# Patient Record
Sex: FEMALE | Race: WHITE | NOT HISPANIC OR LATINO | Employment: OTHER | URBAN - METROPOLITAN AREA
[De-identification: names, ages, dates, MRNs, and addresses within clinical notes are randomized per-mention and may not be internally consistent; named-entity substitution may affect disease eponyms.]

---

## 2017-10-24 ENCOUNTER — ALLSCRIPTS OFFICE VISIT (OUTPATIENT)
Dept: OTHER | Facility: OTHER | Age: 42
End: 2017-10-24

## 2017-10-24 ENCOUNTER — LAB REQUISITION (OUTPATIENT)
Dept: LAB | Facility: HOSPITAL | Age: 42
End: 2017-10-24
Payer: MEDICARE

## 2017-10-24 DIAGNOSIS — Z11.3 ENCOUNTER FOR SCREENING FOR INFECTIONS WITH PREDOMINANTLY SEXUAL MODE OF TRANSMISSION: ICD-10-CM

## 2017-10-24 PROCEDURE — 87591 N.GONORRHOEAE DNA AMP PROB: CPT | Performed by: NURSE PRACTITIONER

## 2017-10-24 PROCEDURE — 87491 CHLMYD TRACH DNA AMP PROBE: CPT | Performed by: NURSE PRACTITIONER

## 2017-10-25 LAB
CHLAMYDIA DNA CVX QL NAA+PROBE: NORMAL
N GONORRHOEA DNA GENITAL QL NAA+PROBE: NORMAL

## 2017-10-27 NOTE — PROGRESS NOTES
Assessment  1  Contraception management (V25 9) (Z30 9)    Plan  VD (venereal disease) screening    · (1) CHLAMYDIA/GC AMPLIFIED DNA, PCR; Source:Urine, Unspecified Source; Status: In  Progress - Specimen/Data Collected;   Done: [id number]   Perform:[facility] In Office Collection; IVV:[id number]; Ordered; For:VD (venereal disease) screening; Ordered By:Shivam Warren;    Discussion/Summary  Discussion Summary:   Patient referred for LN-IUD by her PCP  risks/benefits of IUD with understanding  states that her PCP also reviewed risks with her and gave her the IUD pamphlet gyn exam is up to date, done by pcp in 2017need for GC/chlamydia screening prior to IUD insertion and this was obtained today  preauthorize coverage of IUD with her insurance  this is done, will schedule insertion of IUD  Counseling Documentation With Imm: The patient was counseled regarding  Goals and Barriers: The patient has the current Goals: Contraceptive management with LN-IUD  The patent has the current Barriers: None  Patient's Capacity to Self-Care: Patient is able to Self-Care  Medication SE Review and Pt Understands Tx: Possible side effects of new medications were reviewed with the patient/guardian today  The treatment plan was reviewed with the patient/guardian  The patient/guardian understands and agrees with the treatment plan   Self Referrals:   Self Referrals: No      Chief Complaint  Chief Complaint Free Text Note Form: ACUTE EXAMwants to discuss birth control      History of Present Illness  HPI: 36 yo NEW PATIENT presents for GYN visit  alternative contraception  She was referred here by her PCP for LN-IUD  got depo provera injection last Friday, 4 days ago  that her PCP wants her to stop taking Depo Provera-- due to risk of being on it too long  hx: 2017 negativehx: menarche 16; , 2nd trim termination d/t trisomy 18hx: GC in past, treatedamenorrhea on depo  history: Cognitive delay (on disability); hypothyroidism; GERD; obesity        Past Medical History  1  History of Cognitive developmental delay (315 9) (F81 9)  Active Problems And Past Medical History Reviewed: The active problems and past medical history were reviewed and updated today  Surgical History  Surgical History Reviewed: The surgical history was reviewed and updated today  Family History  Father    1  Family history of diabetes mellitus (V18 0) (Z83 3)   2  Family history of hypertension (V17 49) (Z82 49)   3  Family history of lung cancer (V16 1) (Z80 1)  Family History Reviewed: The family history was reviewed and updated today  Social History   · Denied: History of Alcohol use   · Feels safe at home   · Never a smoker  Social History Reviewed: The social history was reviewed and updated today  Current Meds   1  Amoxicillin 500 MG Oral Capsule; Therapy: (Recorded:24Oct2017) to Recorded   2  Depo-Provera 150 MG/ML Intramuscular Suspension; Therapy: (Recorded:24Oct2017) to Recorded   3  Flonase 50 MCG/ACT SUSP; Therapy: (ZLQKYLZX:[id number]) to Recorded   4  Levothyroxine Sodium 50 MCG Oral Tablet; Therapy: (FSPUZLAU:[id number]) to Recorded   5  Neomycin-Polymyxin-HC SUSP; Therapy: (TFUBOQFU:[id number]) to Recorded   6  Neurontin 100 MG Oral Capsule; Therapy: (WHMEHANH:[id number]) to Recorded   7  Omeprazole 40 MG Oral Capsule Delayed Release; Therapy: (QADRGZRA:[id number]) to Recorded   8  Zyrtec 10 MG TABS; Therapy: (TVKGAVDW:60OMM7842) to Recorded  Medication List Reviewed: The medication list was reviewed and updated today  Allergies  1  No Known Drug Allergies  2  No Known Environmental Allergies   3   No Known Food Allergies    Vitals  Vital Signs    Recorded: 88WVA6455 41:08EN   Systolic 688   Diastolic 76   Height 5 ft 5 in   Weight 249 lb    BMI Calculated 41 44   BSA Calculated 2 17     Physical Exam    Constitutional   General appearance: No acute distress, well appearing and well nourished         Signatures   Electronically signed by : Elba Ramirez NP; Oct 24 2017 11:37AM EST                       (Author)    Electronically signed by : LESTER Valles ; Oct 26 2017  7:53AM EST                       (Author)

## 2017-11-07 ENCOUNTER — GENERIC CONVERSION - ENCOUNTER (OUTPATIENT)
Dept: OTHER | Facility: OTHER | Age: 42
End: 2017-11-07

## 2018-01-12 NOTE — MISCELLANEOUS
Provider Comments  Provider Comments:   Patient had an appointment this morning at 11am and did not show  Patient was called and spoke to patient about her missed appointment  Patient stated she is not going to reschedule her appointment with us due to a friend of hers being on disability and has the same birth control that she wants and on disability as well and [facility]        Signatures   Electronically signed by : Mark James, ; Nov 7 2017 11:36AM EST                       (Author)

## 2018-01-13 VITALS
BODY MASS INDEX: 41.48 KG/M2 | WEIGHT: 249 LBS | HEIGHT: 65 IN | SYSTOLIC BLOOD PRESSURE: 122 MMHG | DIASTOLIC BLOOD PRESSURE: 76 MMHG

## 2018-03-07 NOTE — PROGRESS NOTES
Discussion/Summary    38 yo patient on disability with Medicare-- LN-IUD not covered and patient needs alternative to Depo Provera, per her PCP  Call made to Jagjit Barlow, rep for [facility], to see if there is an assistance program   He will research option and advise us further        Signatures   Electronically signed by : Aleida Castelan NP; Nov 7 2017  9:07AM EST                       (Author)

## 2018-07-10 ENCOUNTER — TELEPHONE (OUTPATIENT)
Dept: FAMILY MEDICINE CLINIC | Facility: CLINIC | Age: 43
End: 2018-07-10

## 2018-07-10 DIAGNOSIS — L85.3 DRY SKIN: ICD-10-CM

## 2018-07-10 DIAGNOSIS — K21.9 GASTROESOPHAGEAL REFLUX DISEASE WITHOUT ESOPHAGITIS: Primary | ICD-10-CM

## 2018-07-10 RX ORDER — OMEPRAZOLE 40 MG/1
40 CAPSULE, DELAYED RELEASE ORAL DAILY
Qty: 30 CAPSULE | Refills: 5 | Status: SHIPPED | OUTPATIENT
Start: 2018-07-10 | End: 2018-07-13 | Stop reason: SDUPTHER

## 2018-07-10 RX ORDER — OMEPRAZOLE 40 MG/1
40 CAPSULE, DELAYED RELEASE ORAL DAILY
COMMUNITY
End: 2018-07-10 | Stop reason: SDUPTHER

## 2018-07-10 NOTE — TELEPHONE ENCOUNTER
Pt called stating she needs a refill on her omeprazole 40 mg  She takes it once a day  She is also asking if she can get the Triamcinolone Cream 0 1% refilled  She states she is having a breakout again on her right arm like she did last time  Pt [facility] pharmacy on file

## 2018-07-13 DIAGNOSIS — K21.9 GASTROESOPHAGEAL REFLUX DISEASE WITHOUT ESOPHAGITIS: ICD-10-CM

## 2018-07-13 RX ORDER — OMEPRAZOLE 40 MG/1
40 CAPSULE, DELAYED RELEASE ORAL DAILY
Qty: 90 CAPSULE | Refills: 4 | Status: SHIPPED | OUTPATIENT
Start: 2018-07-13 | End: 2019-09-20 | Stop reason: SDUPTHER

## 2018-08-07 ENCOUNTER — OFFICE VISIT (OUTPATIENT)
Dept: FAMILY MEDICINE CLINIC | Facility: CLINIC | Age: 43
End: 2018-08-07
Payer: MEDICARE

## 2018-08-07 VITALS
HEIGHT: 65 IN | SYSTOLIC BLOOD PRESSURE: 110 MMHG | TEMPERATURE: 98 F | DIASTOLIC BLOOD PRESSURE: 70 MMHG | HEART RATE: 60 BPM | RESPIRATION RATE: 18 BRPM | BODY MASS INDEX: 40.47 KG/M2 | WEIGHT: 242.9 LBS

## 2018-08-07 DIAGNOSIS — B37.9 CANDIDA INFECTION: Primary | ICD-10-CM

## 2018-08-07 PROCEDURE — 99213 OFFICE O/P EST LOW 20 MIN: CPT | Performed by: NURSE PRACTITIONER

## 2018-08-07 RX ORDER — LEVONORGESTREL AND ETHINYL ESTRADIOL 0.15-0.03
KIT ORAL
Refills: 3 | COMMUNITY
Start: 2018-06-18 | End: 2018-11-06 | Stop reason: SDUPTHER

## 2018-08-07 RX ORDER — ERGOCALCIFEROL (VITAMIN D2) 1250 MCG
CAPSULE ORAL
COMMUNITY
Start: 2017-11-13 | End: 2021-06-01 | Stop reason: ALTCHOICE

## 2018-08-07 RX ORDER — PHENOL 1.4 %
AEROSOL, SPRAY (ML) MUCOUS MEMBRANE EVERY 24 HOURS
COMMUNITY
Start: 2018-01-10

## 2018-08-07 RX ORDER — NYSTATIN 100000 [USP'U]/G
POWDER TOPICAL 3 TIMES DAILY
Qty: 15 G | Refills: 0 | Status: SHIPPED | OUTPATIENT
Start: 2018-08-07 | End: 2018-08-14 | Stop reason: SDUPTHER

## 2018-08-07 RX ORDER — LEVOTHYROXINE SODIUM 0.05 MG/1
TABLET ORAL
COMMUNITY
End: 2018-11-27 | Stop reason: SDUPTHER

## 2018-08-07 RX ORDER — GABAPENTIN 100 MG/1
CAPSULE ORAL
COMMUNITY
End: 2020-12-22

## 2018-08-07 NOTE — PROGRESS NOTES
Assessment/Plan:    No problem-specific Assessment & Plan notes found for this encounter  Diagnoses and all orders for this visit:    Candida infection  Comments:  Do not wear swim suit all day at [facility] under breasts for moisture absorbsion      Other orders  -     levothyroxine 50 mcg tablet; Take by mouth  -     ALTAVERA 0 15-30 MG-MCG per tablet; TAKE 1 TABLET BY ORAL ROUTE EVERY DAY CONTINUOUS ACTIVE MEDICATION FOR 84 DAYS THEN PLACEBO FOR 7  -     ergocalciferol (ERGOCALCIFEROL) 98456 units capsule; take 1 capsule by oral route  every week  -     calcium carbonate (OS-AUGIE) 600 MG tablet; every 24 hours  -     gabapentin (NEURONTIN) 100 mg capsule; Take by mouth          Subjective:   Chief Complaint   Patient presents with    Rash     Bilateral        Patient ID: Pili Simms is a 40 y o  female  Presents today with rash under bilateral breast for over a week  Rash is described as itchy and uncomfortable  The following portions of the patient's history were reviewed and updated as appropriate: allergies, current medications, past family history, past medical history, past social history, past surgical history and problem list     Review of Systems   Constitutional: Negative for chills and fever  Respiratory: Negative for cough  Cardiovascular: Negative for chest pain  Skin: Positive for rash (under bilateral breasts)  Psychiatric/Behavioral: Negative for dysphoric mood  The patient is not nervous/anxious  Objective:  Vitals:    08/07/18 0821   BP: 110/70   BP Location: Left arm   Patient Position: Sitting   Cuff Size: Large   Pulse: 60   Resp: 18   Temp: 98 °F (36 7 °C)   TempSrc: Temporal   Weight: 110 kg (242 lb 14 4 oz)   Height: 5' 5" (1 651 m)      Physical Exam   Constitutional: She is oriented to person, place, and time  She appears well-developed and well-nourished     Cardiovascular: Normal rate, regular rhythm and normal heart sounds  Pulmonary/Chest: Effort normal and breath sounds normal    Neurological: She is alert and oriented to person, place, and time  Skin: Skin is warm and dry  Rash noted  Rash is maculopapular  There is erythema  Bilateral yeast rash   Psychiatric: She has a normal mood and affect   Her behavior is normal

## 2018-08-07 NOTE — PATIENT INSTRUCTIONS
Skin Yeast Infection   WHAT YOU NEED TO KNOW:   Yeast is normally present on the skin  Infection happens when you have too much yeast, or when it gets into a cut on your skin  Certain types of mold and fungus can cause a yeast infection  A skin yeast infection can appear anywhere on your skin or nail beds  Skin yeast infections are usually found on warm, moist parts of the body  Examples include between skin folds or under the breasts  DISCHARGE INSTRUCTIONS:   Return to the emergency department if:   · You have signs of infection, such as pus, warmth or red streaks coming from the wound, or a fever  Contact your healthcare provider if:   · Your symptoms worsen or do not get better within 7 to 10 days  · You have new or returning signs of a skin yeast infection after treatment  · You have questions or concerns about your condition or care  Medicines:   · Antifungal medicine  may be given as a cream, ointment, or pill  · Take your medicine as directed  Contact your healthcare provider if you think your medicine is not helping or if you have side effects  Tell him or her if you are allergic to any medicine  Keep a list of the medicines, vitamins, and herbs you take  Include the amounts, and when and why you take them  Bring the list or the pill bottles to follow-up visits  Carry your medicine list with you in case of an emergency  Care for the skin near the infection:  You may only have discolored patches of skin, or areas that are dry and flaking  Care for these skin problems as directed by your healthcare provider  If you have painful skin or an open sore, you will need to protect the skin and prevent damage  You will also need to keep the skin dry as much as possible  Ask your healthcare provider how to care for your skin while the infection clears  The following are general guidelines for caring for painful or open skin:  · Keep the skin clean    Ask your healthcare provider if you should wash with mild soap and water  Do not use soap that contains alcohol  Alcohol can dry and irritate the skin and make symptoms worse  Your baby's healthcare provider may tell you to use diaper cream or ointment when you change his diaper  This will protect the skin and prevent moisture from collecting  · Keep the skin dry  Pat the area dry with a towel  Do not rub, because this may irritate the skin  If you have a skin yeast infection between skin folds, lift the top part gently and hold it while you dry between your skin folds  Always dry your feet completely after you swim or bathe, including between your toes  Dry your skin if you are sweating from exercise or exposure to heat  Use a clean towel each time to prevent spreading or continuing the infection  · Keep the skin protected  Ask your healthcare provider if you should cover the area with a bandage or leave it open  Check your skin each day to make sure you do not have new or worsening problems  You may need to have someone check the skin if you cannot see the area easily  Prevent another skin yeast infection:   · Do not share clothing or towels    · Wear shower shoes if you need to use a public shower    · Dry your feet completely after you bathe, and apply antifungal powder or cream as directed    · Put on socks before you get dressed so you do not spread fungus from your feet    · Wear light clothing that allows air to get to your skin    · Manage your weight to prevent skin folds where yeast can collect    · Manage diabetes    · Change your baby's diaper often, and keep the area clean and dry as much as possible    · Use a diaper cream or ointment that contains zinc oxide or dimethicone on your baby's diaper area as directed  Follow up with your healthcare provider as directed:  Write down your questions so you remember to ask them during your visits     © 2017 [employer] Information is for End User's use only and may not be sold, redistributed or otherwise used for commercial purposes  All illustrations and images included in CareNotes® are the copyrighted property of A D A M , Inc  or Eyad Brewster  The above information is an  only  It is not intended as medical advice for individual conditions or treatments  Talk to your doctor, nurse or pharmacist before following any medical regimen to see if it is safe and effective for you

## 2018-08-14 DIAGNOSIS — L85.3 DRY SKIN: ICD-10-CM

## 2018-08-14 DIAGNOSIS — B37.9 CANDIDA INFECTION: ICD-10-CM

## 2018-08-14 RX ORDER — NYSTATIN 100000 [USP'U]/G
POWDER TOPICAL 3 TIMES DAILY
Qty: 15 G | Refills: 0 | Status: SHIPPED | OUTPATIENT
Start: 2018-08-14 | End: 2019-05-09 | Stop reason: SDUPTHER

## 2018-08-14 NOTE — TELEPHONE ENCOUNTER
Pt seen rodney last Tuesday for her yeast under her breast the medication that was given is working but its not all the way done and she only has two day left can you please refill for at least one more tube nystatin powder   Pollo Whelan is out of the office today thank you   Please call her at [phone]  [facility]

## 2018-09-21 ENCOUNTER — CLINICAL SUPPORT (OUTPATIENT)
Dept: FAMILY MEDICINE CLINIC | Facility: CLINIC | Age: 43
End: 2018-09-21
Payer: MEDICARE

## 2018-09-21 DIAGNOSIS — Z23 NEED FOR IMMUNIZATION AGAINST INFLUENZA: Primary | ICD-10-CM

## 2018-09-21 PROCEDURE — 90686 IIV4 VACC NO PRSV 0.5 ML IM: CPT

## 2018-09-21 PROCEDURE — G0008 ADMIN INFLUENZA VIRUS VAC: HCPCS

## 2018-09-21 NOTE — PATIENT INSTRUCTIONS
Vacuna contra la gripe, cuidados ambulatorios   INFORMACIÓN GENERAL:   La vacuna contra la gripe  es leigh ann inyección o aerosol nasal que se aplica para ayudar en la prevención de la influenza (gripe)  La influenza es causada por un virus  El virus se propaga de persona a persona por medio de la tos y los estornudos  Varios tipos de virus causan la influenza  [place] virus [place] con el [place], la producción de nuevas vacunas cada año es necesaria  La vacuna comienza la protección contra la influenza aproximadamente 2 semanas después de recibirla  Tipos de vacuna contra la gripe:   · Vacuna contra la gripe:  La vacuna contra la gripe generalmente se aplica en la parte superior del brazo  Podría aplicarse en barr muslo  Usted no puede contraer la gripe de la vacuna porque los tipos de virus usados para hacer la vacuna no están vivos  · Aerosol nasal:  Esta vacuna contra la gripe se administra en forma de aerosol en la nariz  Esta vacuna está hecha de virus débiles de la influenza, que aún están vivos y puede causar enfermedad leve, timothy no causa gripe  Cuándo ponerse la vacuna contra la gripe:  La vacuna contra la gripe se ofrece cada año empezando en octubre o noviembre  Se recomienda ser vacunado contra la gripe tan pronto esté disponible  Los [address]  [employer] 6 meses a 8 años de edad necesitan 2 vacunas ani el primer año de recibirla  Las 2 vacunas deben aplicarse con un intervalo entre 4 o [address]  Es mejor aún si se aplica el mismo tipo de vacuna ambas veces     Quiénes deben recibir la vacuna contra la gripe:   · Bebés mayores de 6 meses de doni     · Cualquier adulto saludable a quien le gustaría reducir el riesgo de contraer la gripe     · Cualquier persona que vive con, o provee cuidado a niños menores de 5 años de edad     · Trabajadores de julieta     · Cualquier persona que viva en centros de convalecencia a milind plazo    · Cualquier persona que sufra de problemas de julieta crónicos, alexandre asma, diabetes, o trastornos en la rosalva     · Cualquier persona cuyo sistema inmunológico está débil     · Mujeres embarazadas o que están planificando quedar en embarazo ani la temporada de la gripe  Quiénes no deben recibir la vacuna contra la gripe:   · Bebés menores de 6 meses     · Cualquier persona que haya sufrido leigh ann reacción alérgica a la vacuna contra la gripe    · Cualquier persona que esté enferma o tenga fiebre     · Cualquier persona que haya recibido un diagnostico del síndrome de [place]illain[place] dentro de las primeras 6 semanas después de luz recibido la inyección contra la gripe     · Cualquier persona alérgica al timerosal (nickolas)  Quiénes deben recibir el aerosol nasal:  La mayoría de las personas saludables entre 2 a 52 años de edad pueden ponerse el aerosol nasal en vez de la inyección  Fabienne Kevin no deben recibir el aerosol nasal:   · Cualquier james lori de 2 años de edad o cualquier persona mayor de 48 años de edad     · [employer] edades de 2 a 4 años que padecen de asma, o jett tenido sibilancias ani los últimos 12 meses     · Cualquier persona que haya sufrido leigh ann reacción alérgica al aerosol nasal     · Cualquier persona que tenga leigh ann alergia a los huevos     · Cualquier persona con problemas de julieta crónicos, alexandre asma, diabetes o trastornos de la rosalva     · Cualquier persona con un sistema inmunológico débil     · Cualquier trabajador de julieta que cuide a leigh ann persona con un sistema inmunológico débil     · Mujeres embarazadas     · Cualquier persona que esté enferma o tenga fiebre     · Cualquier persona que haya recibido un diagnostico del síndrome de Guillain[place] dentro de las primeras 6 semanas de luz recibido leigh ann vacuna contra la gripe  Riesgos de la vacuna contra la gripe:  El área donde le aplicaron la vacuna puede estar enrojecida, adolorida o inflamada  La vacuna contra la influenza podría causar síntomas leves, alexandre fiebre, dolor de [place] y [employer]  El aerosol nasal podría causar fiebre, congestión o flujo nasal, dolor de christie, casper musculares o vómito  Usted aún podría contraer la gripe después de recibir la vacuna  Si usted es alérgico a los [place], pregunte acerca de leigh ann vacuna sin huevo  Usted puede tener leigh ann reacción alérgica a la vacuna  [place] puede ser potencialmente mortal   Busque cuidados inmediatos para los siguientes síntomas:   · Kemi delmi o inflamada    · Urticaria que se propaga por todo barr cuerpo    · Debilidad o mareos    · [place] y gargantas inflamadas    · Sibilancias o dificultad para respirar    · Dolor de pecho o latidos cardíacos rápidos  Programe leigh ann brody con barr proveedor de julieta alexandre se le haya indicado: Anote kami preguntas para que se acuerde de hacerlas ani kami visitas  ACUERDOS SOBRE BARR CUIDADO:   Usted tiene el derecho de participar en la planificación de barr cuidado  Aprenda todo lo que pueda sobre barr condición y alexandre darle tratamiento  Discuta con kami médicos kami opciones de tratamiento para juntos decidir el cuidado que usted quiere recibir  Usted siempre tiene el derecho a rechazar barr tratamiento  Esta información es sólo para uso en educación  Barr intención no es darle un consejo médico sobre enfermedades o tratamientos  Colsulte con barr [place] farmacéutico antes de seguir cualquier régimen médico para saber si es seguro y efectivo para usted  © 2014 [address] is for End User's use only and may not be sold, redistributed or otherwise used for commercial purposes  All illustrations and images included in CareNotes® are the copyrighted property of A D A M , Inc  or Eyad Brewster

## 2018-11-06 ENCOUNTER — ANNUAL EXAM (OUTPATIENT)
Dept: FAMILY MEDICINE CLINIC | Facility: CLINIC | Age: 43
End: 2018-11-06
Payer: MEDICARE

## 2018-11-06 VITALS
SYSTOLIC BLOOD PRESSURE: 120 MMHG | RESPIRATION RATE: 18 BRPM | WEIGHT: 246.7 LBS | BODY MASS INDEX: 41.1 KG/M2 | HEART RATE: 64 BPM | DIASTOLIC BLOOD PRESSURE: 90 MMHG | HEIGHT: 65 IN

## 2018-11-06 DIAGNOSIS — Z30.41 ENCOUNTER FOR BIRTH CONTROL PILLS MAINTENANCE: ICD-10-CM

## 2018-11-06 DIAGNOSIS — Z01.419 WELL FEMALE EXAM WITH ROUTINE GYNECOLOGICAL EXAM: Primary | ICD-10-CM

## 2018-11-06 DIAGNOSIS — Z12.39 SCREENING FOR MALIGNANT NEOPLASM OF BREAST: ICD-10-CM

## 2018-11-06 PROCEDURE — 99214 OFFICE O/P EST MOD 30 MIN: CPT | Performed by: NURSE PRACTITIONER

## 2018-11-06 PROCEDURE — 87624 HPV HI-RISK TYP POOLED RSLT: CPT | Performed by: NURSE PRACTITIONER

## 2018-11-06 PROCEDURE — G0145 SCR C/V CYTO,THINLAYER,RESCR: HCPCS | Performed by: NURSE PRACTITIONER

## 2018-11-06 RX ORDER — LEVONORGESTREL AND ETHINYL ESTRADIOL 0.15-0.03
1 KIT ORAL DAILY
Qty: 90 TABLET | Refills: 0 | Status: SHIPPED | OUTPATIENT
Start: 2018-11-06 | End: 2018-12-10 | Stop reason: SDUPTHER

## 2018-11-06 NOTE — PROGRESS NOTES
Subjective     Alo Padilla is a 40 y o   female and is here for routine health maintenance  The patient reports no problems  History of Present Illness     HPI    Well Adult Physical   Patient here for a Gynecological exam       Diet and Physical Activity  Diet: poor diet  Weight concerns: Patient has class 3 obesity (BMI >40)  Exercise: rarely      Depression Screen  PHQ-9 Depression Screening    PHQ-9:    Frequency of the following problems over the past two weeks:                History:  LMP: 10/20/2018  Menses regular  yes  Cycle Length On OCP's consistantly for 3 months then menses  Flow light  Menorrhagia  no  Dysmenorrhea   no  : 3  Para: 2  Breast Fed no  Bottle Fed yes  Both no   Screening  Pap 2017  Abnormal pap? no  STI no  [employer] >5    The following portions of the patient's history were reviewed and updated as appropriate: allergies, current medications, past family history, past medical history, past social history, past surgical history and problem list     Review of Systems     Review of Systems   Constitutional: Negative for chills, fatigue and fever  Cardiovascular: Negative for leg swelling  Gastrointestinal: Negative for abdominal pain  Genitourinary: Negative for dyspareunia, dysuria, frequency, genital sores, menstrual problem, pelvic pain, urgency, vaginal bleeding, vaginal discharge and vaginal pain  Skin: Negative for rash  Hematological: Negative for adenopathy  Psychiatric/Behavioral: Negative for dysphoric mood  The patient is not nervous/anxious          Breast ROS: No self breast exam  Self Breast Exam No  Genito-Urinary ROS: no dysuria, trouble voiding, or hematuria  Sexually Active not currently  Birth Control Method OCP's  Sexual Dysfunction  no  Vitamin D no    Past Medical History     Past Medical History:   Diagnosis Date    Disease of thyroid gland     GERD (gastroesophageal reflux disease)        Past Surgical History     Past Surgical History:   Procedure Laterality Date     SECTION  2004    PREGNANCY AT TERM       Social History     Social History     Social History    Marital status: Single     Spouse name: N/A    Number of children: N/A    Years of education: N/A     Social History Main Topics    Smoking status: Never Smoker    Smokeless tobacco: Never Used      Comment: NO TOBACCO USE    Alcohol use No    Drug use: No    Sexual activity: No      Comment: Amneal     Other Topics Concern    None     Social History Narrative    None       Family History     Family History   Problem Relation Age of Onset    Thyroid disease Mother         DISORDER    Lung cancer Father     Diabetes Father         MELLITUS    Hypertension Father        Current Medications       Current Outpatient Prescriptions:     ALTAVERA 0 15-30 MG-MCG per tablet, TAKE 1 TABLET BY ORAL ROUTE EVERY DAY CONTINUOUS ACTIVE MEDICATION FOR 84 DAYS THEN PLACEBO FOR 7, Disp: , Rfl: 3    calcium carbonate (OS-AUGIE) 600 MG tablet, every 24 hours, Disp: , Rfl:     ergocalciferol (ERGOCALCIFEROL) 97739 units capsule, take 1 capsule by oral route  every week, Disp: , Rfl:     gabapentin (NEURONTIN) 100 mg capsule, Take by mouth, Disp: , Rfl:     levothyroxine 50 mcg tablet, Take by mouth, Disp: , Rfl:     omeprazole (PriLOSEC) 40 MG capsule, Take 1 capsule (40 mg total) by mouth daily, Disp: 90 capsule, Rfl: 4    nystatin (MYCOSTATIN) powder, Apply topically 3 (three) times a day (Patient not taking: Reported on 2018 ), Disp: 15 g, Rfl: 0    triamcinolone (KENALOG) 0 1 % ointment, Apply topically 2 (two) times a day (Patient not taking: Reported on 2018 ), Disp: 30 g, Rfl: 0     Allergies     Allergies   Allergen Reactions    No Active Allergies        Objective     /90 (BP Location: Left arm, Patient Position: Sitting, Cuff Size: Large)   Pulse 64   Resp 18   Ht 5' 5" (1 651 m)   Wt 112 kg (246 lb 11 2 oz)   LMP 2018 (Approximate)   BMI 41 05 kg/m²      Physical Exam   Constitutional: She is oriented to person, place, and time  She appears well-developed and well-nourished  Neck: No thyromegaly present  Cardiovascular: Normal rate, regular rhythm and normal heart sounds  Pulmonary/Chest: Effort normal and breath sounds normal  Right breast exhibits no inverted nipple, no mass, no nipple discharge, no skin change and no tenderness  Left breast exhibits no inverted nipple, no mass, no nipple discharge, no skin change and no tenderness  Breasts are symmetrical    Abdominal: There is no tenderness  Genitourinary: Uterus normal  No breast swelling, tenderness, discharge or bleeding  No labial fusion  There is no rash, tenderness, lesion or injury on the right labia  There is no rash, tenderness, lesion or injury on the left labia  Cervix exhibits no motion tenderness, no discharge and no friability  Right adnexum displays no mass, no tenderness and no fullness  Left adnexum displays no mass, no tenderness and no fullness  No vaginal discharge found  Genitourinary Comments: Obesity limits exam accuracy     Lymphadenopathy:     She has no cervical adenopathy  Neurological: She is alert and oriented to person, place, and time  Skin: Skin is warm and dry  Psychiatric: She has a normal mood and affect   Her behavior is normal          No exam data present    Health Maintenance     Health Maintenance   Topic Date Due    MAMMOGRAM  1976    DTaP,Tdap,and Td Vaccines (2 - Td) 09/22/2019    INFLUENZA VACCINE  Completed     Immunization History   Administered Date(s) Administered    Fluzone Split Quad 0 25 mL 10/05/2016    Influenza 10/05/2016, 09/27/2017, 09/27/2017    Influenza, injectable, quadrivalent, preservative free 0 5 mL 09/21/2018    Tdap 09/22/2009       Assessment/Plan     Healthy well female  Waiting on PAP/HPV, Mammo results  Diet and exercise discussed    Mindy Claude, CRNP

## 2018-11-09 LAB
HPV HR 12 DNA CVX QL NAA+PROBE: NEGATIVE
HPV16 DNA CVX QL NAA+PROBE: NEGATIVE
HPV18 DNA CVX QL NAA+PROBE: NEGATIVE

## 2018-11-12 LAB
LAB AP GYN PRIMARY INTERPRETATION: NORMAL
Lab: NORMAL

## 2018-11-13 ENCOUNTER — TELEPHONE (OUTPATIENT)
Dept: FAMILY MEDICINE CLINIC | Facility: CLINIC | Age: 43
End: 2018-11-13

## 2018-11-27 DIAGNOSIS — E03.9 HYPOTHYROIDISM, UNSPECIFIED TYPE: Primary | ICD-10-CM

## 2018-11-28 RX ORDER — LEVOTHYROXINE SODIUM 0.05 MG/1
50 TABLET ORAL DAILY
Qty: 30 TABLET | Refills: 12 | Status: SHIPPED | OUTPATIENT
Start: 2018-11-28 | End: 2019-09-17 | Stop reason: SDUPTHER

## 2018-12-10 ENCOUNTER — TELEPHONE (OUTPATIENT)
Dept: FAMILY MEDICINE CLINIC | Facility: CLINIC | Age: 43
End: 2018-12-10

## 2018-12-10 DIAGNOSIS — Z30.41 ENCOUNTER FOR BIRTH CONTROL PILLS MAINTENANCE: ICD-10-CM

## 2018-12-10 RX ORDER — LEVONORGESTREL AND ETHINYL ESTRADIOL 0.15-0.03
1 KIT ORAL DAILY
Qty: 90 TABLET | Refills: 0 | Status: SHIPPED | OUTPATIENT
Start: 2018-12-10 | End: 2018-12-11 | Stop reason: SDUPTHER

## 2018-12-10 NOTE — TELEPHONE ENCOUNTER
Please call Simón Brian and see what the issue is  The directions are for her to take 84 days of active medication and 7 of inactive

## 2018-12-10 NOTE — TELEPHONE ENCOUNTER
Can you please refill patient medication [facility] she normally gets 4 packs 90 days supply

## 2018-12-10 NOTE — TELEPHONE ENCOUNTER
Patient called she said the Rx was called in wrong for the Altavera 0 15-30 mg she wants a four pack of her birth control pills called into [facility] at [phone] she said that she is on her last row of her birth control pill she would like it taken care of   TY

## 2018-12-11 ENCOUNTER — TELEPHONE (OUTPATIENT)
Dept: FAMILY MEDICINE CLINIC | Facility: CLINIC | Age: 43
End: 2018-12-11

## 2018-12-11 DIAGNOSIS — Z30.41 ENCOUNTER FOR BIRTH CONTROL PILLS MAINTENANCE: ICD-10-CM

## 2018-12-11 RX ORDER — LEVONORGESTREL AND ETHINYL ESTRADIOL 0.15-0.03
1 KIT ORAL DAILY
Qty: 120 TABLET | Refills: 3 | Status: SHIPPED | OUTPATIENT
Start: 2018-12-11 | End: 2019-06-25

## 2019-01-24 ENCOUNTER — OFFICE VISIT (OUTPATIENT)
Dept: FAMILY MEDICINE CLINIC | Facility: CLINIC | Age: 44
End: 2019-01-24
Payer: MEDICARE

## 2019-01-24 VITALS
BODY MASS INDEX: 41.82 KG/M2 | TEMPERATURE: 96.1 F | HEART RATE: 76 BPM | WEIGHT: 251 LBS | DIASTOLIC BLOOD PRESSURE: 70 MMHG | HEIGHT: 65 IN | SYSTOLIC BLOOD PRESSURE: 92 MMHG

## 2019-01-24 DIAGNOSIS — B37.9 CANDIDIASIS: Primary | ICD-10-CM

## 2019-01-24 DIAGNOSIS — R73.09 ABNORMAL GLUCOSE: ICD-10-CM

## 2019-01-24 DIAGNOSIS — E66.01 CLASS 3 SEVERE OBESITY DUE TO EXCESS CALORIES WITHOUT SERIOUS COMORBIDITY WITH BODY MASS INDEX (BMI) OF 40.0 TO 44.9 IN ADULT (HCC): ICD-10-CM

## 2019-01-24 DIAGNOSIS — E03.9 HYPOTHYROIDISM, UNSPECIFIED TYPE: ICD-10-CM

## 2019-01-24 PROCEDURE — 99213 OFFICE O/P EST LOW 20 MIN: CPT | Performed by: NURSE PRACTITIONER

## 2019-01-24 RX ORDER — NYSTATIN 100000 U/G
CREAM TOPICAL 2 TIMES DAILY
Qty: 30 G | Refills: 0 | Status: SHIPPED | OUTPATIENT
Start: 2019-01-24 | End: 2020-08-14 | Stop reason: ALTCHOICE

## 2019-01-24 RX ORDER — MONTELUKAST SODIUM 10 MG/1
TABLET ORAL EVERY 24 HOURS
COMMUNITY
Start: 2018-05-15 | End: 2020-12-22

## 2019-01-24 RX ORDER — NYSTATIN 100000 U/G
CREAM TOPICAL 2 TIMES DAILY
Status: DISCONTINUED | OUTPATIENT
Start: 2019-01-24 | End: 2019-01-24

## 2019-01-24 NOTE — PATIENT INSTRUCTIONS
Skin Yeast Infection   WHAT YOU NEED TO KNOW:   What do I need to know about a skin yeast infection? Yeast is normally present on the skin  Infection happens when you have too much yeast, or when it gets into a cut on your skin  Certain types of mold and fungus can cause a yeast infection  A skin yeast infection can appear anywhere on your skin or nail beds  Skin yeast infections are usually found on warm, moist parts of the body  Examples include between skin folds or under the breasts  What increases my risk for a skin yeast infection? · Elderly age, especially as skin gets thinner and tears more easily    · Obesity that causes skin folds where moisture can collect    · Diapers that are not changed regularly and allow moisture to sit on your baby's skin    · Diabetes, especially if it is not controlled    · Bedrest that allows moisture to collect on your skin    · Immune system problems    · Certain medicines, including antibiotics or medicines that weaken your immune system    · Pregnancy or hormone changes    · Moisture left on your feet or between your toes after you bathe, or that builds up under a ring you wear  What are the signs and symptoms of a skin yeast infection? Signs and symptoms will depend on the type of yeast causing the infection, and where the infection is located  · Red, scaly skin    · Changes in skin color, especially a beefy red color    · Itching, dry skin    · Painful, cracking skin at the corners of your mouth    · Thick, discolored, chipping nails    · Skin lesions that may be red or purple and round    · Pus bumps  How is a skin yeast infection diagnosed and treated? Your healthcare provider may know you have a skin yeast infection from your signs and symptoms  He may take a sample of your skin to check for fungus  He may also look at areas of your skin under ultraviolet light to show which type of yeast infection you have   You may be given an antifungal cream or ointment to treat the infection  You may be given antifungal medicine as a pill if your infection is severe  How do I care for the skin near the infection? You may only have discolored patches of skin, or areas that are dry and flaking  Care for these skin problems as directed by your healthcare provider  If you have painful skin or an open sore, you will need to protect the skin and prevent damage  You will also need to keep the skin dry as much as possible  Ask your healthcare provider how to care for your skin while the infection clears  The following are general guidelines for caring for painful or open skin:  · Keep the skin clean  Ask your healthcare provider if you should wash with mild soap and water  Do not use soap that contains alcohol  Alcohol can dry and irritate the skin and make symptoms worse  Your baby's healthcare provider may tell you to use diaper cream or ointment when you change his diaper  This will protect the skin and prevent moisture from collecting  · Keep the skin dry  Pat the area dry with a towel  Do not rub, because this may irritate the skin  If you have a skin yeast infection between skin folds, lift the top part gently and hold it while you dry between your skin folds  Always dry your feet completely after you swim or bathe, including between your toes  Dry your skin if you are sweating from exercise or exposure to heat  Use a clean towel each time to prevent spreading or continuing the infection  · Keep the skin protected  Ask your healthcare provider if you should cover the area with a bandage or leave it open  Check your skin each day to make sure you do not have new or worsening problems  You may need to have someone check the skin if you cannot see the area easily  What can I do to prevent a skin yeast infection?    · Do not share clothing or towels    · Wear shower shoes if you need to use a public shower    · Dry your feet completely after you bathe, and apply antifungal powder or cream as directed    · Put on socks before you get dressed so you do not spread fungus from your feet    · Wear light clothing that allows air to get to your skin    · Manage your weight to prevent skin folds where yeast can collect    · Manage diabetes    · Change your baby's diaper often, and keep the area clean and dry as much as possible    · Use a diaper cream or ointment that contains zinc oxide or dimethicone on your baby's diaper area as directed  When should I seek immediate care? · You have signs of infection, such as pus, warmth or red streaks coming from the wound, or a fever  When should I contact my healthcare provider? · Your symptoms worsen or do not get better within 7 to 10 days  · You have new or returning signs of a skin yeast infection after treatment  · You have questions or concerns about your condition or care  CARE AGREEMENT:   You have the right to help plan your care  Learn about your health condition and how it may be treated  Discuss treatment options with your caregivers to decide what care you want to receive  You always have the right to refuse treatment  The above information is an  only  It is not intended as medical advice for individual conditions or treatments  Talk to your doctor, nurse or pharmacist before following any medical regimen to see if it is safe and effective for you  © 2017 [employer]  Information is for End User's use only and may not be sold, redistributed or otherwise used for commercial purposes  All illustrations and images included in CareNotes® are the copyrighted property of [employer]  or [employer]

## 2019-01-24 NOTE — PROGRESS NOTES
Assessment/Plan:    No problem-specific Assessment & Plan notes found for this encounter  Diagnoses and all orders for this visit:    Candidiasis  -     Discontinue: nystatin (MYCOSTATIN) cream; Apply topically 2 (two) times a day   -     Comprehensive metabolic panel; Future  -     Lipid panel; Future  -     CBC and differential; Future  -     HEMOGLOBIN A1C W/ EAG ESTIMATION; Future  -     Vitamin D 25 hydroxy; Future  -     nystatin (MYCOSTATIN) cream; Apply topically 2 (two) times a day    Hypothyroidism, unspecified type  -     Comprehensive metabolic panel; Future  -     TSH, 3rd generation with Free T4 reflex; Future    Class 3 severe obesity due to excess calories without serious comorbidity with body mass index (BMI) of 40 0 to 44 9 in [facility])  -     Comprehensive metabolic panel; Future  -     Lipid panel; Future  -     TSH, 3rd generation with Free T4 reflex; Future  -     CBC and differential; Future  -     HEMOGLOBIN A1C W/ EAG ESTIMATION; Future  -     Vitamin D 25 hydroxy; Future    Abnormal glucose  -     HEMOGLOBIN A1C W/ EAG ESTIMATION; Future          Subjective:      Patient ID: Rodrigo Acharya is a 44 y o  female  HPI      Viki Ricks presents today for rash on her left armpit area  Started 2 days ago  She has discomfort more of a burning painful when she lifts and move that left arm  She has nothing in the right armpit at this time  She states when she puts on deodorant it really burns  She has subsequently stopped using it in both  The following portions of the patient's history were reviewed and updated as appropriate: allergies, current medications, past family history, past medical history, past social history, past surgical history and problem list     Review of Systems   Constitutional: Negative for activity change, appetite change, fatigue, fever and unexpected weight change  HENT: Negative for congestion, dental problem and sneezing      Eyes: Negative for discharge and visual disturbance  Respiratory: Negative for cough and wheezing  Gastrointestinal: Negative for abdominal pain, constipation, diarrhea, nausea and vomiting  Endocrine: Negative for polydipsia and polyuria  Genitourinary: Negative for dysuria and frequency  Musculoskeletal: Negative for arthralgias  Skin: Positive for rash  Allergic/Immunologic: Negative for environmental allergies and food allergies  Neurological: Negative for headaches  Hematological: Negative for adenopathy  Psychiatric/Behavioral: Negative for behavioral problems and sleep disturbance  Objective:  Vitals:    01/24/19 1056   BP: 92/70   BP Location: Right arm   Patient Position: Sitting   Cuff Size: Large   Pulse: 76   Temp: (!) 96 1 °F (35 6 °C)   TempSrc: Temporal   Weight: 114 kg (251 lb)   Height: 5' 5" (1 651 m)      Physical Exam   Constitutional: She appears well-developed and well-nourished  Skin:        Vitals reviewed

## 2019-01-25 ENCOUNTER — CLINICAL SUPPORT (OUTPATIENT)
Dept: FAMILY MEDICINE CLINIC | Facility: CLINIC | Age: 44
End: 2019-01-25
Payer: MEDICARE

## 2019-01-25 DIAGNOSIS — E03.9 HYPOTHYROIDISM, UNSPECIFIED TYPE: ICD-10-CM

## 2019-01-25 DIAGNOSIS — R73.09 ABNORMAL GLUCOSE: ICD-10-CM

## 2019-01-25 DIAGNOSIS — E66.01 CLASS 3 SEVERE OBESITY DUE TO EXCESS CALORIES WITHOUT SERIOUS COMORBIDITY WITH BODY MASS INDEX (BMI) OF 40.0 TO 44.9 IN ADULT (HCC): ICD-10-CM

## 2019-01-25 DIAGNOSIS — B37.9 CANDIDIASIS: ICD-10-CM

## 2019-01-25 LAB
25(OH)D3 SERPL-MCNC: 22.1 NG/ML (ref 30–100)
ALBUMIN SERPL BCP-MCNC: 3.2 G/DL (ref 3.5–5)
ALP SERPL-CCNC: 67 U/L (ref 46–116)
ALT SERPL W P-5'-P-CCNC: 22 U/L (ref 12–78)
ANION GAP SERPL CALCULATED.3IONS-SCNC: 9 MMOL/L (ref 4–13)
AST SERPL W P-5'-P-CCNC: 18 U/L (ref 5–45)
BASOPHILS # BLD AUTO: 0.05 THOUSANDS/ΜL (ref 0–0.1)
BASOPHILS NFR BLD AUTO: 1 % (ref 0–1)
BILIRUB SERPL-MCNC: 0.55 MG/DL (ref 0.2–1)
BUN SERPL-MCNC: 18 MG/DL (ref 5–25)
CALCIUM SERPL-MCNC: 8.8 MG/DL (ref 8.3–10.1)
CHLORIDE SERPL-SCNC: 104 MMOL/L (ref 100–108)
CHOLEST SERPL-MCNC: 177 MG/DL (ref 50–200)
CO2 SERPL-SCNC: 22 MMOL/L (ref 21–32)
CREAT SERPL-MCNC: 0.99 MG/DL (ref 0.6–1.3)
EOSINOPHIL # BLD AUTO: 0.18 THOUSAND/ΜL (ref 0–0.61)
EOSINOPHIL NFR BLD AUTO: 3 % (ref 0–6)
ERYTHROCYTE [DISTWIDTH] IN BLOOD BY AUTOMATED COUNT: 13.3 % (ref 11.6–15.1)
EST. AVERAGE GLUCOSE BLD GHB EST-MCNC: 117 MG/DL
GFR SERPL CREATININE-BSD FRML MDRD: 71 ML/MIN/1.73SQ M
GLUCOSE P FAST SERPL-MCNC: 77 MG/DL (ref 65–99)
HBA1C MFR BLD: 5.7 % (ref 4.2–6.3)
HCT VFR BLD AUTO: 42.7 % (ref 34.8–46.1)
HDLC SERPL-MCNC: 36 MG/DL (ref 40–60)
HGB BLD-MCNC: 13.7 G/DL (ref 11.5–15.4)
IMM GRANULOCYTES # BLD AUTO: 0.02 THOUSAND/UL (ref 0–0.2)
IMM GRANULOCYTES NFR BLD AUTO: 0 % (ref 0–2)
LDLC SERPL CALC-MCNC: 120 MG/DL (ref 0–100)
LYMPHOCYTES # BLD AUTO: 2.14 THOUSANDS/ΜL (ref 0.6–4.47)
LYMPHOCYTES NFR BLD AUTO: 30 % (ref 14–44)
MCH RBC QN AUTO: 27.2 PG (ref 26.8–34.3)
MCHC RBC AUTO-ENTMCNC: 32.1 G/DL (ref 31.4–37.4)
MCV RBC AUTO: 85 FL (ref 82–98)
MONOCYTES # BLD AUTO: 0.53 THOUSAND/ΜL (ref 0.17–1.22)
MONOCYTES NFR BLD AUTO: 8 % (ref 4–12)
NEUTROPHILS # BLD AUTO: 4.12 THOUSANDS/ΜL (ref 1.85–7.62)
NEUTS SEG NFR BLD AUTO: 58 % (ref 43–75)
NONHDLC SERPL-MCNC: 141 MG/DL
NRBC BLD AUTO-RTO: 0 /100 WBCS
PLATELET # BLD AUTO: 315 THOUSANDS/UL (ref 149–390)
PMV BLD AUTO: 11.6 FL (ref 8.9–12.7)
POTASSIUM SERPL-SCNC: 4.6 MMOL/L (ref 3.5–5.3)
PROT SERPL-MCNC: 7.5 G/DL (ref 6.4–8.2)
RBC # BLD AUTO: 5.04 MILLION/UL (ref 3.81–5.12)
SODIUM SERPL-SCNC: 135 MMOL/L (ref 136–145)
TRIGL SERPL-MCNC: 105 MG/DL
TSH SERPL DL<=0.05 MIU/L-ACNC: 2.93 UIU/ML (ref 0.36–3.74)
WBC # BLD AUTO: 7.04 THOUSAND/UL (ref 4.31–10.16)

## 2019-01-25 PROCEDURE — 82306 VITAMIN D 25 HYDROXY: CPT | Performed by: NURSE PRACTITIONER

## 2019-01-25 PROCEDURE — 36415 COLL VENOUS BLD VENIPUNCTURE: CPT | Performed by: NURSE PRACTITIONER

## 2019-01-25 PROCEDURE — 85025 COMPLETE CBC W/AUTO DIFF WBC: CPT | Performed by: NURSE PRACTITIONER

## 2019-01-25 PROCEDURE — 80053 COMPREHEN METABOLIC PANEL: CPT | Performed by: NURSE PRACTITIONER

## 2019-01-25 PROCEDURE — 83036 HEMOGLOBIN GLYCOSYLATED A1C: CPT | Performed by: NURSE PRACTITIONER

## 2019-01-25 PROCEDURE — 80061 LIPID PANEL: CPT | Performed by: NURSE PRACTITIONER

## 2019-01-25 PROCEDURE — 84443 ASSAY THYROID STIM HORMONE: CPT | Performed by: NURSE PRACTITIONER

## 2019-04-30 ENCOUNTER — TELEPHONE (OUTPATIENT)
Dept: FAMILY MEDICINE CLINIC | Facility: CLINIC | Age: 44
End: 2019-04-30

## 2019-05-09 ENCOUNTER — OFFICE VISIT (OUTPATIENT)
Dept: FAMILY MEDICINE CLINIC | Facility: CLINIC | Age: 44
End: 2019-05-09
Payer: MEDICARE

## 2019-05-09 VITALS
DIASTOLIC BLOOD PRESSURE: 70 MMHG | RESPIRATION RATE: 18 BRPM | SYSTOLIC BLOOD PRESSURE: 118 MMHG | HEART RATE: 88 BPM | OXYGEN SATURATION: 97 % | TEMPERATURE: 97.6 F | BODY MASS INDEX: 41.1 KG/M2 | HEIGHT: 65 IN | WEIGHT: 246.7 LBS

## 2019-05-09 DIAGNOSIS — B37.9 CANDIDA INFECTION: Primary | ICD-10-CM

## 2019-05-09 PROCEDURE — 99213 OFFICE O/P EST LOW 20 MIN: CPT | Performed by: NURSE PRACTITIONER

## 2019-05-09 RX ORDER — NYSTATIN 100000 [USP'U]/G
POWDER TOPICAL 3 TIMES DAILY
Qty: 15 G | Refills: 0 | Status: SHIPPED | OUTPATIENT
Start: 2019-05-09 | End: 2019-05-21 | Stop reason: SDUPTHER

## 2019-05-16 ENCOUNTER — OFFICE VISIT (OUTPATIENT)
Dept: FAMILY MEDICINE CLINIC | Facility: CLINIC | Age: 44
End: 2019-05-16
Payer: MEDICARE

## 2019-05-16 VITALS
WEIGHT: 247.9 LBS | HEIGHT: 65 IN | BODY MASS INDEX: 41.3 KG/M2 | SYSTOLIC BLOOD PRESSURE: 124 MMHG | DIASTOLIC BLOOD PRESSURE: 84 MMHG | TEMPERATURE: 99.3 F | HEART RATE: 84 BPM | RESPIRATION RATE: 12 BRPM

## 2019-05-16 DIAGNOSIS — J06.9 VIRAL UPPER RESPIRATORY INFECTION: Primary | ICD-10-CM

## 2019-05-16 DIAGNOSIS — F31.81 BIPOLAR II DISORDER (HCC): ICD-10-CM

## 2019-05-16 LAB — S PYO AG THROAT QL: NEGATIVE

## 2019-05-16 PROCEDURE — 99213 OFFICE O/P EST LOW 20 MIN: CPT | Performed by: NURSE PRACTITIONER

## 2019-05-16 PROCEDURE — 87880 STREP A ASSAY W/OPTIC: CPT | Performed by: NURSE PRACTITIONER

## 2019-05-16 RX ORDER — GUAIFENESIN 600 MG
1200 TABLET, EXTENDED RELEASE 12 HR ORAL EVERY 12 HOURS SCHEDULED
Qty: 20 TABLET | Refills: 1 | Status: SHIPPED | OUTPATIENT
Start: 2019-05-16 | End: 2020-08-14 | Stop reason: ALTCHOICE

## 2019-05-21 DIAGNOSIS — B37.9 CANDIDA INFECTION: ICD-10-CM

## 2019-05-21 RX ORDER — NYSTATIN 100000 [USP'U]/G
POWDER TOPICAL 3 TIMES DAILY
Qty: 15 G | Refills: 6 | Status: SHIPPED | OUTPATIENT
Start: 2019-05-21 | End: 2019-06-14 | Stop reason: SDUPTHER

## 2019-05-28 ENCOUNTER — OFFICE VISIT (OUTPATIENT)
Dept: FAMILY MEDICINE CLINIC | Facility: CLINIC | Age: 44
End: 2019-05-28
Payer: MEDICARE

## 2019-05-28 VITALS
SYSTOLIC BLOOD PRESSURE: 122 MMHG | BODY MASS INDEX: 41.38 KG/M2 | HEIGHT: 65 IN | DIASTOLIC BLOOD PRESSURE: 78 MMHG | OXYGEN SATURATION: 98 % | HEART RATE: 73 BPM | WEIGHT: 248.4 LBS | TEMPERATURE: 98.4 F | RESPIRATION RATE: 18 BRPM

## 2019-05-28 DIAGNOSIS — N92.1 BREAKTHROUGH BLEEDING ON BIRTH CONTROL PILLS: Primary | ICD-10-CM

## 2019-05-28 PROCEDURE — 99213 OFFICE O/P EST LOW 20 MIN: CPT | Performed by: NURSE PRACTITIONER

## 2019-06-14 ENCOUNTER — TELEPHONE (OUTPATIENT)
Dept: FAMILY MEDICINE CLINIC | Facility: CLINIC | Age: 44
End: 2019-06-14

## 2019-06-14 DIAGNOSIS — B37.9 CANDIDA INFECTION: ICD-10-CM

## 2019-06-14 RX ORDER — NYSTATIN 100000 [USP'U]/G
POWDER TOPICAL 3 TIMES DAILY
Qty: 15 G | Refills: 6 | Status: SHIPPED | OUTPATIENT
Start: 2019-06-14 | End: 2020-08-14 | Stop reason: ALTCHOICE

## 2019-06-17 ENCOUNTER — OFFICE VISIT (OUTPATIENT)
Dept: FAMILY MEDICINE CLINIC | Facility: CLINIC | Age: 44
End: 2019-06-17
Payer: MEDICARE

## 2019-06-17 VITALS
RESPIRATION RATE: 18 BRPM | SYSTOLIC BLOOD PRESSURE: 122 MMHG | BODY MASS INDEX: 42.25 KG/M2 | WEIGHT: 247.5 LBS | HEIGHT: 64 IN | DIASTOLIC BLOOD PRESSURE: 74 MMHG | TEMPERATURE: 97.6 F | HEART RATE: 73 BPM | OXYGEN SATURATION: 98 %

## 2019-06-17 DIAGNOSIS — N76.0 ACUTE VAGINITIS: ICD-10-CM

## 2019-06-17 DIAGNOSIS — N76.0 VAGINOSIS: Primary | ICD-10-CM

## 2019-06-17 PROCEDURE — 99213 OFFICE O/P EST LOW 20 MIN: CPT | Performed by: NURSE PRACTITIONER

## 2019-06-17 PROCEDURE — G0438 PPPS, INITIAL VISIT: HCPCS | Performed by: NURSE PRACTITIONER

## 2019-06-17 PROCEDURE — 87510 GARDNER VAG DNA DIR PROBE: CPT | Performed by: NURSE PRACTITIONER

## 2019-06-17 PROCEDURE — 87591 N.GONORRHOEAE DNA AMP PROB: CPT | Performed by: NURSE PRACTITIONER

## 2019-06-17 PROCEDURE — 87660 TRICHOMONAS VAGIN DIR PROBE: CPT | Performed by: NURSE PRACTITIONER

## 2019-06-17 PROCEDURE — 87491 CHLMYD TRACH DNA AMP PROBE: CPT | Performed by: NURSE PRACTITIONER

## 2019-06-17 PROCEDURE — 87480 CANDIDA DNA DIR PROBE: CPT | Performed by: NURSE PRACTITIONER

## 2019-06-18 LAB
C TRACH DNA SPEC QL NAA+PROBE: NEGATIVE
N GONORRHOEA DNA SPEC QL NAA+PROBE: NEGATIVE

## 2019-06-19 ENCOUNTER — TELEPHONE (OUTPATIENT)
Dept: FAMILY MEDICINE CLINIC | Facility: CLINIC | Age: 44
End: 2019-06-19

## 2019-06-19 LAB
CANDIDA RRNA VAG QL PROBE: NEGATIVE
G VAGINALIS RRNA GENITAL QL PROBE: NEGATIVE
T VAGINALIS RRNA GENITAL QL PROBE: NEGATIVE

## 2019-06-20 ENCOUNTER — TELEPHONE (OUTPATIENT)
Dept: FAMILY MEDICINE CLINIC | Facility: CLINIC | Age: 44
End: 2019-06-20

## 2019-06-24 ENCOUNTER — CLINICAL SUPPORT (OUTPATIENT)
Dept: FAMILY MEDICINE CLINIC | Facility: CLINIC | Age: 44
End: 2019-06-24
Payer: MEDICARE

## 2019-06-24 DIAGNOSIS — N92.1 BREAKTHROUGH BLEEDING ON BIRTH CONTROL PILLS: Primary | ICD-10-CM

## 2019-06-24 LAB
BACTERIA UR QL AUTO: ABNORMAL /HPF
BILIRUB UR QL STRIP: ABNORMAL
CLARITY UR: CLEAR
COLOR UR: ABNORMAL
GLUCOSE UR STRIP-MCNC: NEGATIVE MG/DL
HGB UR QL STRIP.AUTO: ABNORMAL
KETONES UR STRIP-MCNC: NEGATIVE MG/DL
LEUKOCYTE ESTERASE UR QL STRIP: ABNORMAL
NITRITE UR QL STRIP: NEGATIVE
NON-SQ EPI CELLS URNS QL MICRO: ABNORMAL /HPF
PH UR STRIP.AUTO: 6 [PH]
PROT UR STRIP-MCNC: NEGATIVE MG/DL
RBC #/AREA URNS AUTO: ABNORMAL /HPF
SL AMB  POCT GLUCOSE, UA: NEGATIVE
SL AMB LEUKOCYTE ESTERASE,UA: ABNORMAL
SL AMB POCT BILIRUBIN,UA: NEGATIVE
SL AMB POCT BLOOD,UA: ABNORMAL
SL AMB POCT CLARITY,UA: CLEAR
SL AMB POCT COLOR,UA: ABNORMAL
SL AMB POCT KETONES,UA: NEGATIVE
SL AMB POCT NITRITE,UA: NEGATIVE
SL AMB POCT PH,UA: 5
SL AMB POCT SPECIFIC GRAVITY,UA: 1.01
SL AMB POCT URINE PROTEIN: ABNORMAL
SL AMB POCT UROBILINOGEN: NEGATIVE
SP GR UR STRIP.AUTO: 1.02 (ref 1–1.03)
UROBILINOGEN UR QL STRIP.AUTO: 0.2 E.U./DL
WBC #/AREA URNS AUTO: ABNORMAL /HPF

## 2019-06-24 PROCEDURE — 81001 URINALYSIS AUTO W/SCOPE: CPT

## 2019-06-24 PROCEDURE — 81002 URINALYSIS NONAUTO W/O SCOPE: CPT

## 2019-06-25 ENCOUNTER — TELEPHONE (OUTPATIENT)
Dept: FAMILY MEDICINE CLINIC | Facility: CLINIC | Age: 44
End: 2019-06-25

## 2019-06-25 DIAGNOSIS — Z30.41 SURVEILLANCE FOR BIRTH CONTROL, ORAL CONTRACEPTIVES: Primary | ICD-10-CM

## 2019-06-25 RX ORDER — NORGESTIMATE AND ETHINYL ESTRADIOL 0.25-0.035
1 KIT ORAL DAILY
Qty: 91 TABLET | Refills: 3 | Status: SHIPPED | OUTPATIENT
Start: 2019-06-25 | End: 2019-12-16 | Stop reason: SDUPTHER

## 2019-06-26 ENCOUNTER — TELEPHONE (OUTPATIENT)
Dept: FAMILY MEDICINE CLINIC | Facility: CLINIC | Age: 44
End: 2019-06-26

## 2019-06-27 ENCOUNTER — TELEPHONE (OUTPATIENT)
Dept: FAMILY MEDICINE CLINIC | Facility: CLINIC | Age: 44
End: 2019-06-27

## 2019-07-08 ENCOUNTER — OFFICE VISIT (OUTPATIENT)
Dept: FAMILY MEDICINE CLINIC | Facility: CLINIC | Age: 44
End: 2019-07-08
Payer: MEDICARE

## 2019-07-08 VITALS
RESPIRATION RATE: 14 BRPM | BODY MASS INDEX: 40.79 KG/M2 | HEIGHT: 65 IN | WEIGHT: 244.8 LBS | DIASTOLIC BLOOD PRESSURE: 86 MMHG | SYSTOLIC BLOOD PRESSURE: 118 MMHG

## 2019-07-08 DIAGNOSIS — R23.8 SKIN IRRITATION: Primary | ICD-10-CM

## 2019-07-08 PROCEDURE — 99213 OFFICE O/P EST LOW 20 MIN: CPT | Performed by: NURSE PRACTITIONER

## 2019-07-08 NOTE — PROGRESS NOTES
Subjective:   Chief Complaint   Patient presents with    Rash     Right breast two small lesions about 1 week no pain, no itching         Patient ID: Pepper Giles is a 44 y o  female  Presents today with complaints of a bump on her right breast   Denies any itching or pain  She did try some Neosporin on it without resolution      The following portions of the patient's history were reviewed and updated as appropriate: allergies, current medications, past family history, past medical history, past social history, past surgical history and problem list     Review of Systems   Constitutional: Negative for chills and fever  Respiratory: Negative for cough  Cardiovascular: Negative for chest pain  Skin: Positive for wound (right breast)  Psychiatric/Behavioral: Negative for dysphoric mood  The patient is not nervous/anxious  Objective:  Vitals:    07/08/19 1532   BP: 118/86   BP Location: Right arm   Patient Position: Sitting   Resp: 14   Weight: 111 kg (244 lb 12 8 oz)   Height: 5' 4 8" (1 646 m)      Physical Exam   Constitutional: She appears well-developed and well-nourished  Cardiovascular: Normal rate, regular rhythm, normal heart sounds and intact distal pulses  Pulmonary/Chest: Effort normal and breath sounds normal        Neurological: No cranial nerve deficit  Skin: Rash noted  Rash is macular  There is erythema (right lower breast)  Psychiatric: She has a normal mood and affect  Her behavior is normal          Assessment/Plan:    No problem-specific Assessment & Plan notes found for this encounter  Diagnoses and all orders for this visit:    Skin irritation  -     hydrocortisone 2 5 % ointment;  Apply topically 2 (two) times a day

## 2019-07-16 ENCOUNTER — TELEPHONE (OUTPATIENT)
Dept: FAMILY MEDICINE CLINIC | Facility: CLINIC | Age: 44
End: 2019-07-16

## 2019-07-16 NOTE — TELEPHONE ENCOUNTER
Pt responding to message  Pt states that she started the birth control on Saturday night  Last pill was last night pt hasn't taken the pill-she will tonight  Pt states that she seen blood this morning at 9am it wasn't a lot  Pt states that she hasn't seen any blood since

## 2019-07-16 NOTE — TELEPHONE ENCOUNTER
Pt called in complaining of receiving her menstrual due to a new birth control that she started taking Ivan night  Pt is requesting a call back from provider asap  Pt can be reached at [phone] please advise thank you

## 2019-07-16 NOTE — TELEPHONE ENCOUNTER
Please call patient and find out when she stopped the last pill and when her period started   What day did she start the new pill and when did her bleeding start again

## 2019-07-17 ENCOUNTER — OFFICE VISIT (OUTPATIENT)
Dept: FAMILY MEDICINE CLINIC | Facility: CLINIC | Age: 44
End: 2019-07-17
Payer: MEDICARE

## 2019-07-17 VITALS
SYSTOLIC BLOOD PRESSURE: 118 MMHG | HEIGHT: 65 IN | HEART RATE: 72 BPM | TEMPERATURE: 99.2 F | BODY MASS INDEX: 39.85 KG/M2 | WEIGHT: 239.2 LBS | RESPIRATION RATE: 16 BRPM | DIASTOLIC BLOOD PRESSURE: 80 MMHG

## 2019-07-17 DIAGNOSIS — B37.2 CANDIDIASIS OF SKIN: Primary | ICD-10-CM

## 2019-07-17 PROCEDURE — 99213 OFFICE O/P EST LOW 20 MIN: CPT | Performed by: NURSE PRACTITIONER

## 2019-07-17 RX ORDER — FLUCONAZOLE 100 MG/1
100 TABLET ORAL DAILY
Qty: 7 TABLET | Refills: 0 | Status: SHIPPED | OUTPATIENT
Start: 2019-07-17 | End: 2019-07-24

## 2019-07-17 NOTE — PROGRESS NOTES
Assessment/Plan:         Diagnoses and all orders for this visit:    Candidiasis of skin  -     fluconazole (DIFLUCAN) 100 mg tablet; Take 1 tablet (100 mg total) by mouth daily for 7 days      try to keep dry  Keep topical powder for when starts back     Subjective:      Patient ID: Grady Salcido is a 44 y o  female  HPI    Was using the nystatin powder  Not helping    Not in wet suit  Has been changing   Glucose check is normal    The following portions of the patient's history were reviewed and updated as appropriate: allergies, current medications, past family history, past medical history, past social history, past surgical history and problem list     Review of Systems   Constitutional: Negative for activity change, appetite change, fatigue and fever  Skin: Positive for rash  Irritation on the skin folds in groin area  Itches just a little  Denies vaginal discharge  Objective:  Vitals:    07/17/19 1046   BP: 118/80   BP Location: Left arm   Patient Position: Sitting   Cuff Size: Large   Pulse: 72   Resp: 16   Temp: 99 2 °F (37 3 °C)   TempSrc: Temporal   Weight: 109 kg (239 lb 3 2 oz)   Height: 5' 4 8" (1 646 m)      Physical Exam   Constitutional: She is oriented to person, place, and time  She appears well-developed and well-nourished  Neurological: She is alert and oriented to person, place, and time  Skin:        Vitals reviewed

## 2019-07-31 ENCOUNTER — TELEPHONE (OUTPATIENT)
Dept: FAMILY MEDICINE CLINIC | Facility: CLINIC | Age: 44
End: 2019-07-31

## 2019-07-31 NOTE — TELEPHONE ENCOUNTER
Pt called stating she started her new birth control pill  Pt states she is in her first pack 3rd row but on Sunday she noticed when she wipes again there is blood  She states it is light and not every time she wipes  She stated it had stopped before but is back

## 2019-08-27 ENCOUNTER — TELEPHONE (OUTPATIENT)
Dept: FAMILY MEDICINE CLINIC | Facility: CLINIC | Age: 44
End: 2019-08-27

## 2019-08-27 NOTE — TELEPHONE ENCOUNTER
Patient had called on 7/31 that she was having bleeding with first pack of new birth control pills  Patient called back again this morning stating she is on her 3rd pack on first row and still having the bleeding on and off

## 2019-09-11 ENCOUNTER — HOSPITAL ENCOUNTER (OUTPATIENT)
Dept: MAMMOGRAPHY | Facility: MEDICAL CENTER | Age: 44
Discharge: HOME | End: 2019-09-11
Payer: MEDICARE

## 2019-09-11 VITALS — WEIGHT: 239 LBS | HEIGHT: 64 IN | BODY MASS INDEX: 40.8 KG/M2

## 2019-09-11 DIAGNOSIS — Z12.39 SCREENING FOR MALIGNANT NEOPLASM OF BREAST: ICD-10-CM

## 2019-09-11 PROCEDURE — 77067 SCR MAMMO BI INCL CAD: CPT

## 2019-09-11 PROCEDURE — 77063 BREAST TOMOSYNTHESIS BI: CPT

## 2019-09-17 DIAGNOSIS — E03.9 HYPOTHYROIDISM, UNSPECIFIED TYPE: ICD-10-CM

## 2019-09-17 RX ORDER — LEVOTHYROXINE SODIUM 0.05 MG/1
50 TABLET ORAL DAILY
Qty: 90 TABLET | Refills: 3 | Status: SHIPPED | OUTPATIENT
Start: 2019-09-17 | End: 2020-09-03 | Stop reason: SDUPTHER

## 2019-09-20 DIAGNOSIS — K21.9 GASTROESOPHAGEAL REFLUX DISEASE WITHOUT ESOPHAGITIS: ICD-10-CM

## 2019-09-20 RX ORDER — OMEPRAZOLE 40 MG/1
40 CAPSULE, DELAYED RELEASE ORAL DAILY
Qty: 90 CAPSULE | Refills: 3 | Status: SHIPPED | OUTPATIENT
Start: 2019-09-20 | End: 2020-10-24 | Stop reason: SDUPTHER

## 2019-09-25 ENCOUNTER — CLINICAL SUPPORT (OUTPATIENT)
Dept: FAMILY MEDICINE CLINIC | Facility: CLINIC | Age: 44
End: 2019-09-25
Payer: MEDICARE

## 2019-09-25 DIAGNOSIS — Z23 NEED FOR INFLUENZA VACCINATION: Primary | ICD-10-CM

## 2019-09-25 PROCEDURE — 90682 RIV4 VACC RECOMBINANT DNA IM: CPT | Performed by: NURSE PRACTITIONER

## 2019-09-25 PROCEDURE — 90471 IMMUNIZATION ADMIN: CPT | Performed by: NURSE PRACTITIONER

## 2019-12-03 ENCOUNTER — ANNUAL EXAM (OUTPATIENT)
Dept: FAMILY MEDICINE CLINIC | Facility: CLINIC | Age: 44
End: 2019-12-03
Payer: MEDICARE

## 2019-12-03 VITALS
WEIGHT: 251.7 LBS | RESPIRATION RATE: 18 BRPM | TEMPERATURE: 100.6 F | DIASTOLIC BLOOD PRESSURE: 76 MMHG | HEART RATE: 73 BPM | BODY MASS INDEX: 42.97 KG/M2 | SYSTOLIC BLOOD PRESSURE: 124 MMHG | HEIGHT: 64 IN | OXYGEN SATURATION: 99 %

## 2019-12-03 DIAGNOSIS — Z12.31 ENCOUNTER FOR SCREENING MAMMOGRAM FOR MALIGNANT NEOPLASM OF BREAST: ICD-10-CM

## 2019-12-03 DIAGNOSIS — Z01.419 WELL FEMALE EXAM WITH ROUTINE GYNECOLOGICAL EXAM: Primary | ICD-10-CM

## 2019-12-03 DIAGNOSIS — N76.1 SUBACUTE VAGINITIS: ICD-10-CM

## 2019-12-03 DIAGNOSIS — Z23 IMMUNIZATION DUE: ICD-10-CM

## 2019-12-03 DIAGNOSIS — Z11.4 SCREENING FOR HUMAN IMMUNODEFICIENCY VIRUS: ICD-10-CM

## 2019-12-03 PROCEDURE — G0101 CA SCREEN;PELVIC/BREAST EXAM: HCPCS | Performed by: NURSE PRACTITIONER

## 2019-12-03 PROCEDURE — 87591 N.GONORRHOEAE DNA AMP PROB: CPT | Performed by: NURSE PRACTITIONER

## 2019-12-03 PROCEDURE — 87660 TRICHOMONAS VAGIN DIR PROBE: CPT | Performed by: NURSE PRACTITIONER

## 2019-12-03 PROCEDURE — 87389 HIV-1 AG W/HIV-1&-2 AB AG IA: CPT | Performed by: NURSE PRACTITIONER

## 2019-12-03 PROCEDURE — 87510 GARDNER VAG DNA DIR PROBE: CPT | Performed by: NURSE PRACTITIONER

## 2019-12-03 PROCEDURE — 87480 CANDIDA DNA DIR PROBE: CPT | Performed by: NURSE PRACTITIONER

## 2019-12-03 PROCEDURE — 87491 CHLMYD TRACH DNA AMP PROBE: CPT | Performed by: NURSE PRACTITIONER

## 2019-12-03 PROCEDURE — 36415 COLL VENOUS BLD VENIPUNCTURE: CPT | Performed by: NURSE PRACTITIONER

## 2019-12-03 NOTE — PROGRESS NOTES
John Yarbrough is a 38 y o   female and is here for routine health maintenance  The patient reports no problems  History of Present Illness     HPI    Well Adult Physical   Patient here for a Gynecological exam       Diet and Physical Activity  Diet: poor diet  Weight concerns: Patient has class 3 obesity (BMI >40)  Exercise: rarely      Depression Screen  PHQ-9 Depression Screening    PHQ-9:    Frequency of the following problems over the past two weeks:                History:  LMP: only gets period every 3 1/2 months on the consecutive OCP   Menses regular  every 3 1/2 months  Cycle Length 90 days  Flow minimal  Menorrhagia  no  Dysmenorrhea   no  : 3  Para: 2  Breast Fed no  Bottle Fed yes  Both not asked   Screening  Pap 2018  Abnormal pap? no  STI yes gonorrhea many years ago  [facility] 10    The following portions of the patient's history were reviewed and updated as appropriate: allergies, current medications, past family history, past medical history, past social history, past surgical history and problem list     Review of Systems     Review of Systems   Constitutional: Negative for chills, fatigue and fever  Cardiovascular: Negative for leg swelling  Gastrointestinal: Negative for abdominal pain  Genitourinary: Negative for dyspareunia, dysuria, frequency, genital sores, menstrual problem, pelvic pain, urgency, vaginal bleeding, vaginal discharge and vaginal pain  Skin: Negative for rash  Hematological: Negative for adenopathy  Psychiatric/Behavioral: Negative for dysphoric mood  The patient is not nervous/anxious          Breast ROS: negative for breast lumps  Self Breast Exam yes  Genito-Urinary ROS: no dysuria, trouble voiding, or hematuria  Sexually Active no  Birth Control Method OCP  Sexual Dysfunction  no  Vitamin D yes    Past Medical History     Past Medical History:   Diagnosis Date    Disease of thyroid gland     GERD (gastroesophageal reflux disease)        Past Surgical History     Past Surgical History:   Procedure Laterality Date     SECTION  2004    PREGNANCY AT TERM       Social History     Social History     Socioeconomic History    Marital status: Single     Spouse name: None    Number of children: 1    Years of education: None    Highest education level: None   Occupational History    None   Social Needs    Financial resource strain: None    Food insecurity:     Worry: None     Inability: None    Transportation needs:     Medical: None     Non-medical: None   Tobacco Use    Smoking status: Never Smoker    Smokeless tobacco: Never Used    Tobacco comment: NO TOBACCO USE   Substance and Sexual Activity    Alcohol use: No    Drug use: No    Sexual activity: Never     Birth control/protection: Other     Comment: Amneal   Lifestyle    Physical activity:     Days per week: None     Minutes per session: None    Stress: None   Relationships    Social connections:     Talks on phone: None     Gets together: None     Attends Yazidism service: None     Active member of club or organization: None     Attends meetings of clubs or organizations: None     Relationship status: None    Intimate partner violence:     Fear of current or ex partner: None     Emotionally abused: None     Physically abused: None     Forced sexual activity: None   Other Topics Concern    None   Social History Narrative    None       Family History     Family History   Problem Relation Age of Onset    Thyroid disease Mother         DISORDER    Lung cancer Father     Diabetes Father         MELLITUS    Hypertension Father     No Known Problems Daughter     No Known Problems Maternal Grandmother     No Known Problems Maternal Grandfather     No Known Problems Paternal Grandmother     No Known Problems Paternal Grandfather     No Known Problems Maternal Aunt     No Known Problems Maternal Aunt        Current Medications       Current Outpatient Medications:     calcium carbonate (OS-AUGIE) 600 MG tablet, every 24 hours, Disp: , Rfl:     Cholecalciferol 5000 units TABS, take 1 capsule by oral route  every day start after completing weekly dose, Disp: , Rfl:     ergocalciferol (ERGOCALCIFEROL) 97933 units capsule, take 1 capsule by oral route  every week, Disp: , Rfl:     gabapentin (NEURONTIN) 100 mg capsule, Take by mouth, Disp: , Rfl:     guaiFENesin (MUCINEX) 600 mg 12 hr tablet, Take 2 tablets (1,200 mg total) by mouth every 12 (twelve) hours, Disp: 20 tablet, Rfl: 1    hydrocortisone 2 5 % ointment, Apply topically 2 (two) times a day, Disp: 30 g, Rfl: 0    levothyroxine 50 mcg tablet, Take 1 tablet (50 mcg total) by mouth daily, Disp: 90 tablet, Rfl: 3    montelukast (SINGULAIR) 10 mg tablet, every 24 hours, Disp: , Rfl:     nystatin (MYCOSTATIN) cream, Apply topically 2 (two) times a day, Disp: 30 g, Rfl: 0    nystatin (MYCOSTATIN) powder, Apply topically 3 (three) times a day, Disp: 15 g, Rfl: 6    omeprazole (PriLOSEC) 40 MG capsule, Take 1 capsule (40 mg total) by mouth daily, Disp: 90 capsule, Rfl: 3    triamcinolone (KENALOG) 0 1 % ointment, Apply topically 2 (two) times a day, Disp: 30 g, Rfl: 0    norgestimate-ethinyl estradiol (ORTHO-CYCLEN) 0 25-35 MG-MCG per tablet, Take 1 tablet by mouth daily for 91 days Take 84 days of continuous active medication and 7 days of placebo pills, Disp: 91 tablet, Rfl: 3     Allergies     Allergies   Allergen Reactions    No Active Allergies        Objective     /76 (BP Location: Left arm, Patient Position: Sitting, Cuff Size: Large)   Pulse 73   Temp (!) 100 6 °F (38 1 °C) (Tympanic)   Resp 18   Ht 5' 4" (1 626 m)   Wt 114 kg (251 lb 11 2 oz)   SpO2 99%   BMI 43 20 kg/m²      Physical Exam   Constitutional: She is oriented to person, place, and time  She appears well-developed and well-nourished  Neck: No thyromegaly present     Cardiovascular: Normal rate, regular rhythm and normal heart sounds  Pulmonary/Chest: Effort normal and breath sounds normal  Right breast exhibits no inverted nipple, no mass, no nipple discharge, no skin change and no tenderness  Left breast exhibits no inverted nipple, no mass, no nipple discharge, no skin change and no tenderness  No breast tenderness, discharge or bleeding  Breasts are symmetrical    Abdominal: There is no tenderness  Genitourinary: Uterus normal  No breast tenderness, discharge or bleeding  No labial fusion  There is no rash, tenderness, lesion or injury on the right labia  There is no rash, tenderness, lesion or injury on the left labia  Cervix exhibits no motion tenderness, no discharge and no friability  Right adnexum displays no mass, no tenderness and no fullness  Left adnexum displays no mass, no tenderness and no fullness  Vaginal discharge found  Genitourinary Comments: Obesity limits exam accuracy   Lymphadenopathy:     She has no cervical adenopathy  Neurological: She is alert and oriented to person, place, and time  Skin: Skin is warm and dry  Psychiatric: She has a normal mood and affect   Her behavior is normal          No exam data present    Health Maintenance     Health Maintenance   Topic Date Due    HIV Screening  11/19/1991    BMI: Followup Plan  11/19/1994    DTaP,Tdap,and Td Vaccines (2 - Td) 09/22/2019    Medicare Annual Wellness Visit (AWV)  06/17/2020    BMI: Adult  09/11/2020    MAMMOGRAM  09/11/2020    Cervical Cancer Screening  11/06/2023    Pneumococcal Vaccine: 65+ Years (1 of 2 - PCV13) 11/19/2041    Influenza Vaccine  Completed    Pneumococcal Vaccine: Pediatrics (0 to 5 Years) and At-Risk Patients (6 to 59 Years)  Aged Out    HIB Vaccine  Aged Out    Hepatitis B Vaccine  Aged Out    IPV Vaccine  Aged Out    Hepatitis A Vaccine  Aged Out    Meningococcal ACWY Vaccine  Aged Out    HPV Vaccine  Aged [employer] History   Administered Date(s) Administered    [facility] Quad 0 25 mL 10/05/2016    INFLUENZA 10/05/2016, 09/27/2017, 09/27/2017    Influenza, injectable, quadrivalent, preservative free 0 5 mL 09/21/2018    Influenza, recombinant, quadrivalent,injectable, preservative free 09/25/2019    Tdap 09/22/2009       Assessment/Plan     Healthy well female  Waiting on PAP/HPV and mammogram results    MIKI Christie BMI Counseling: Body mass index is 43 2 kg/m²  The BMI is above normal  Nutrition recommendations include reducing portion sizes, decreasing overall calorie intake, 3-5 servings of fruits/vegetables daily, reducing fast food intake, consuming healthier snacks, decreasing soda and/or juice intake, moderation in carbohydrate intake and increasing intake of lean protein  Exercise recommendations include exercising 3-5 times per week

## 2019-12-04 LAB — HIV 1+2 AB+HIV1 P24 AG SERPL QL IA: NORMAL

## 2019-12-05 LAB
C TRACH DNA SPEC QL NAA+PROBE: NEGATIVE
CANDIDA RRNA VAG QL PROBE: NEGATIVE
G VAGINALIS RRNA GENITAL QL PROBE: NEGATIVE
N GONORRHOEA DNA SPEC QL NAA+PROBE: NEGATIVE
T VAGINALIS RRNA GENITAL QL PROBE: NEGATIVE

## 2019-12-16 DIAGNOSIS — Z30.41 SURVEILLANCE FOR BIRTH CONTROL, ORAL CONTRACEPTIVES: ICD-10-CM

## 2019-12-16 RX ORDER — NORGESTIMATE AND ETHINYL ESTRADIOL 0.25-0.035
1 KIT ORAL DAILY
Qty: 91 TABLET | Refills: 3 | Status: SHIPPED | OUTPATIENT
Start: 2019-12-16 | End: 2021-05-31

## 2020-01-02 ENCOUNTER — OFFICE VISIT (OUTPATIENT)
Dept: FAMILY MEDICINE CLINIC | Facility: CLINIC | Age: 45
End: 2020-01-02
Payer: MEDICARE

## 2020-01-02 VITALS
DIASTOLIC BLOOD PRESSURE: 74 MMHG | BODY MASS INDEX: 43.43 KG/M2 | TEMPERATURE: 98.2 F | HEIGHT: 64 IN | OXYGEN SATURATION: 98 % | WEIGHT: 254.4 LBS | HEART RATE: 67 BPM | SYSTOLIC BLOOD PRESSURE: 122 MMHG

## 2020-01-02 DIAGNOSIS — F31.81 BIPOLAR II DISORDER (HCC): ICD-10-CM

## 2020-01-02 DIAGNOSIS — E66.01 CLASS 3 SEVERE OBESITY DUE TO EXCESS CALORIES WITHOUT SERIOUS COMORBIDITY WITH BODY MASS INDEX (BMI) OF 40.0 TO 44.9 IN ADULT (HCC): ICD-10-CM

## 2020-01-02 DIAGNOSIS — R21 RASH: Primary | ICD-10-CM

## 2020-01-02 DIAGNOSIS — L85.3 DRY SKIN: ICD-10-CM

## 2020-01-02 PROBLEM — E66.813 CLASS 3 SEVERE OBESITY DUE TO EXCESS CALORIES WITHOUT SERIOUS COMORBIDITY WITH BODY MASS INDEX (BMI) OF 40.0 TO 44.9 IN ADULT (HCC): Status: ACTIVE | Noted: 2020-01-02

## 2020-01-02 PROCEDURE — 99213 OFFICE O/P EST LOW 20 MIN: CPT | Performed by: NURSE PRACTITIONER

## 2020-02-06 ENCOUNTER — OFFICE VISIT (OUTPATIENT)
Dept: FAMILY MEDICINE CLINIC | Facility: CLINIC | Age: 45
End: 2020-02-06
Payer: MEDICARE

## 2020-02-06 VITALS
WEIGHT: 252 LBS | OXYGEN SATURATION: 98 % | HEIGHT: 64 IN | TEMPERATURE: 100 F | BODY MASS INDEX: 43.02 KG/M2 | RESPIRATION RATE: 16 BRPM | SYSTOLIC BLOOD PRESSURE: 110 MMHG | HEART RATE: 90 BPM | DIASTOLIC BLOOD PRESSURE: 76 MMHG

## 2020-02-06 DIAGNOSIS — N92.1 BREAKTHROUGH BLEEDING ON BIRTH CONTROL PILLS: Primary | ICD-10-CM

## 2020-02-06 PROBLEM — M17.12 PRIMARY OSTEOARTHRITIS OF LEFT KNEE: Status: ACTIVE | Noted: 2020-01-13

## 2020-02-06 PROCEDURE — 99214 OFFICE O/P EST MOD 30 MIN: CPT | Performed by: NURSE PRACTITIONER

## 2020-02-06 PROCEDURE — 1036F TOBACCO NON-USER: CPT | Performed by: NURSE PRACTITIONER

## 2020-02-06 PROCEDURE — 3008F BODY MASS INDEX DOCD: CPT | Performed by: NURSE PRACTITIONER

## 2020-02-06 NOTE — PROGRESS NOTES
Subjective:   Chief Complaint   Patient presents with    Menstrual Problem        Patient ID: Payton Talley is a 38 y o  female  Presents today with concerns over breakthrough bleeding again  She is currently taking Ortho-Cyclen continuously for 84 days then having a menses  This last cycle she did not have any bleeding while on her placebo  She is currently in the 1st week of her 2nd pill pack and yesterday experienced 2 episodes of spotting only when wiping nothing on pad  Discussed with patient that this can be very normal expressly in light of the fact that she did not have any menses last cycle  Advised if she has bleeding menstrual type for longer than 7 days or spotting that last longer than 30 days to give me a call  Also discussed that this could possibly be some nataliia menopausal issues as well  Discussed nataliia menopausal symptoms as well as what determines a woman to be postmenopausal     More than half of this 20 minute visit spent counseling and coordinating care  The following portions of the patient's history were reviewed and updated as appropriate: allergies, current medications, past family history, past medical history, past social history, past surgical history and problem list     Review of Systems          Objective:  Vitals:    02/06/20 0809   BP: 110/76   BP Location: Left arm   Patient Position: Sitting   Cuff Size: Large   Pulse: 90   Resp: 16   Temp: 100 °F (37 8 °C)   TempSrc: Tympanic   SpO2: 98%   Weight: 114 kg (252 lb)   Height: 5' 4" (1 626 m)      Physical Exam      Assessment/Plan:    No problem-specific Assessment & Plan notes found for this encounter  Diagnoses and all orders for this visit:    Breakthrough bleeding on birth control pills  Comments:   Will watch for further issues

## 2020-02-10 ENCOUNTER — TELEPHONE (OUTPATIENT)
Dept: FAMILY MEDICINE CLINIC | Facility: CLINIC | Age: 45
End: 2020-02-10

## 2020-02-17 ENCOUNTER — OFFICE VISIT (OUTPATIENT)
Dept: FAMILY MEDICINE CLINIC | Facility: CLINIC | Age: 45
End: 2020-02-17
Payer: MEDICARE

## 2020-02-17 VITALS
HEIGHT: 64 IN | BODY MASS INDEX: 41.69 KG/M2 | WEIGHT: 244.2 LBS | RESPIRATION RATE: 18 BRPM | SYSTOLIC BLOOD PRESSURE: 118 MMHG | OXYGEN SATURATION: 97 % | HEART RATE: 87 BPM | TEMPERATURE: 100.7 F | DIASTOLIC BLOOD PRESSURE: 80 MMHG

## 2020-02-17 DIAGNOSIS — Z23 IMMUNIZATION DUE: ICD-10-CM

## 2020-02-17 DIAGNOSIS — J11.1 INFLUENZA-LIKE ILLNESS: Primary | ICD-10-CM

## 2020-02-17 PROCEDURE — 90471 IMMUNIZATION ADMIN: CPT

## 2020-02-17 PROCEDURE — 87631 RESP VIRUS 3-5 TARGETS: CPT | Performed by: NURSE PRACTITIONER

## 2020-02-17 PROCEDURE — 90714 TD VACC NO PRESV 7 YRS+ IM: CPT

## 2020-02-17 PROCEDURE — 3008F BODY MASS INDEX DOCD: CPT | Performed by: NURSE PRACTITIONER

## 2020-02-17 PROCEDURE — 1036F TOBACCO NON-USER: CPT | Performed by: NURSE PRACTITIONER

## 2020-02-17 PROCEDURE — 99214 OFFICE O/P EST MOD 30 MIN: CPT | Performed by: NURSE PRACTITIONER

## 2020-02-17 RX ORDER — OSELTAMIVIR PHOSPHATE 75 MG/1
75 CAPSULE ORAL EVERY 12 HOURS SCHEDULED
Qty: 10 CAPSULE | Refills: 0 | Status: SHIPPED | OUTPATIENT
Start: 2020-02-17 | End: 2020-02-22

## 2020-02-17 NOTE — PATIENT INSTRUCTIONS
Influenza   AMBULATORY CARE:   Influenza  (the flu) is an infection caused by the influenza virus  The flu is easily spread when an infected person coughs, sneezes, or has close contact with others  You may be able to spread the flu to others for 1 week or longer after signs or symptoms appear  Common signs and symptoms include the following:   · Fever and chills    · Headaches, body aches, and muscle or joint pain    · Cough, runny nose, and sore throat    · Loss of appetite, nausea, vomiting, or diarrhea    · Tiredness    · Trouble breathing  Call 911 for any of the following:   · You have trouble breathing, and your lips look purple or blue  · You have a seizure  Seek care immediately if:   · You are dizzy, or you are urinating less or not at all  · You have a headache with a stiff neck, and you feel tired or confused  · You have new pain or pressure in your chest     · Your symptoms, such as shortness of breath, vomiting, or diarrhea, get worse  · Your symptoms, such as fever and coughing, seem to get better, but then get worse  Contact your healthcare provider if:   · You have new muscle pain or weakness  · You have questions or concerns about your condition or care  Treatment for influenza  may include any of the following:  · Acetaminophen  decreases pain and fever  It is available without a doctor's order  Ask how much to take and how often to take it  Follow directions  Acetaminophen can cause liver damage if not taken correctly  · NSAIDs , such as ibuprofen, help decrease swelling, pain, and fever  This medicine is available with or without a doctor's order  NSAIDs can cause stomach bleeding or kidney problems in certain people  If you take blood thinner medicine, always ask your healthcare provider if NSAIDs are safe for you  Always read the medicine label and follow directions  · Antivirals  help fight a viral infection    Manage your symptoms:   · Rest  as much as you can to help you recover  · Drink liquids as directed  to help prevent dehydration  Ask how much liquid to drink each day and which liquids are best for you  Prevent the spread of the flu:   · Wash your hands often  Use soap and water  Wash your hands after you use the bathroom, change a child's diapers, or sneeze  Wash your hands before you prepare or eat food  Use gel hand cleanser when soap and water are not available  Do not touch your eyes, nose, or mouth unless you have washed your hands first            · Cover your mouth when you sneeze or cough  Cough into a tissue or the bend of your arm  · Clean shared items with a germ-killing   Clean table surfaces, doorknobs, and light switches  Do not share towels, silverware, and dishes with people who are sick  Wash bed sheets, towels, silverware, and dishes with soap and water  · Wear a mask  over your mouth and nose if you are sick or are near anyone who is sick  · Stay away from others  if you are sick  · Influenza vaccine  helps prevent influenza (flu)  Everyone older than 6 months should get a yearly influenza vaccine  Get the vaccine as soon as it is available, usually in September or October each year  Follow up with your healthcare provider as directed:  Write down your questions so you remember to ask them during your visits  © 2017 [employer] Information is for End User's use only and may not be sold, redistributed or otherwise used for commercial purposes  All illustrations and images included in CareNotes® are the copyrighted property of A D A M , Inc  or Eyad Brewster  The above information is an  only  It is not intended as medical advice for individual conditions or treatments  Talk to your doctor, nurse or pharmacist before following any medical regimen to see if it is safe and effective for you

## 2020-02-17 NOTE — PROGRESS NOTES
Assessment/Plan:      Diagnoses and all orders for this visit:    Influenza-like illness  -     oseltamivir (TAMIFLU) 75 mg capsule; Take 1 capsule (75 mg total) by mouth every 12 (twelve) hours for 5 days  -     Influenza A/B and RSV PCR    Immunization due  -     TD VACCINE GREATER THAN OR EQUAL TO 6YO PRESERVATIVE FREE IM          Subjective:  Chief Complaint   Patient presents with    Cold Like Symptoms    Cough        Patient ID: Eugene Casas is a 38 y o  female  Presents today for complaints of upper respiratory symptoms which began as a scratchy throat on Friday  Progressed Saturday day with runny nose, cough, chills and body aches  She did get her flu shot this year  She does currently have a fever at 100 7  She has not done anything for her symptoms      Review of Systems   Constitutional: Positive for chills, fatigue and fever  HENT: Positive for congestion, postnasal drip, rhinorrhea, sneezing and sore throat  Negative for ear pain  Respiratory: Positive for cough  Negative for shortness of breath and wheezing  Cardiovascular: Negative for chest pain and palpitations  Musculoskeletal: Positive for myalgias  Neurological: Positive for headaches  Psychiatric/Behavioral: Negative for dysphoric mood  The patient is not nervous/anxious  The following portions of the patient's history were reviewed and updated as appropriate: allergies, current medications, past family history, past medical history, past social history, past surgical history and problem list     Objective:  Vitals:    02/17/20 1516   BP: 118/80   BP Location: Left arm   Patient Position: Sitting   Cuff Size: Large   Pulse: 87   Resp: 18   Temp: (!) 100 7 °F (38 2 °C)   TempSrc: Tympanic   SpO2: 97%   Weight: 111 kg (244 lb 3 2 oz)   Height: 5' 4" (1 626 m)      Physical Exam   Constitutional: She is oriented to person, place, and time  She appears well-developed and well-nourished     HENT:   Right Ear: Tympanic membrane and ear canal normal    Left Ear: Tympanic membrane and ear canal normal    Nose: Mucosal edema and rhinorrhea present  Mouth/Throat: Uvula is midline, oropharynx is clear and moist and mucous membranes are normal    Neck: Normal range of motion  Neck supple  Cardiovascular: Normal rate, regular rhythm, normal heart sounds and intact distal pulses  Pulmonary/Chest: Effort normal and breath sounds normal  No respiratory distress  She has no wheezes  Lymphadenopathy:     She has cervical adenopathy  Neurological: She is alert and oriented to person, place, and time  Skin: Skin is warm  Psychiatric: She has a normal mood and affect   Her behavior is normal

## 2020-02-18 ENCOUNTER — TELEPHONE (OUTPATIENT)
Dept: FAMILY MEDICINE CLINIC | Facility: CLINIC | Age: 45
End: 2020-02-18

## 2020-02-18 LAB
FLUAV RNA NPH QL NAA+PROBE: DETECTED
FLUBV RNA NPH QL NAA+PROBE: ABNORMAL
RSV RNA NPH QL NAA+PROBE: ABNORMAL

## 2020-02-18 NOTE — TELEPHONE ENCOUNTER
Left message for patient to call the office, per [facility]   Please call patient with positive flu A   Remind her to drink plenty of fluids finish the tamiflu and avoid people until she is 7 days  out

## 2020-05-19 ENCOUNTER — TELEMEDICINE (OUTPATIENT)
Dept: FAMILY MEDICINE CLINIC | Facility: CLINIC | Age: 45
End: 2020-05-19
Payer: MEDICARE

## 2020-05-19 DIAGNOSIS — N92.1 BREAKTHROUGH BLEEDING ON BIRTH CONTROL PILLS: Primary | ICD-10-CM

## 2020-05-19 PROCEDURE — 99213 OFFICE O/P EST LOW 20 MIN: CPT | Performed by: NURSE PRACTITIONER

## 2020-05-19 RX ORDER — NORETHINDRONE ACETATE AND ETHINYL ESTRADIOL 1MG-20(21)
1 KIT ORAL DAILY
Qty: 91 TABLET | Refills: 1 | Status: SHIPPED | OUTPATIENT
Start: 2020-05-19 | End: 2020-12-04

## 2020-08-14 ENCOUNTER — OFFICE VISIT (OUTPATIENT)
Dept: FAMILY MEDICINE CLINIC | Facility: CLINIC | Age: 45
End: 2020-08-14
Payer: MEDICARE

## 2020-08-14 ENCOUNTER — TELEPHONE (OUTPATIENT)
Dept: FAMILY MEDICINE CLINIC | Facility: CLINIC | Age: 45
End: 2020-08-14

## 2020-08-14 VITALS
WEIGHT: 235.6 LBS | RESPIRATION RATE: 18 BRPM | TEMPERATURE: 97.5 F | BODY MASS INDEX: 40.44 KG/M2 | SYSTOLIC BLOOD PRESSURE: 130 MMHG | DIASTOLIC BLOOD PRESSURE: 80 MMHG | HEART RATE: 80 BPM

## 2020-08-14 DIAGNOSIS — E03.9 HYPOTHYROIDISM, UNSPECIFIED TYPE: Primary | ICD-10-CM

## 2020-08-14 DIAGNOSIS — R53.83 OTHER FATIGUE: ICD-10-CM

## 2020-08-14 LAB
ALBUMIN SERPL BCP-MCNC: 3.3 G/DL (ref 3.5–5)
ALP SERPL-CCNC: 63 U/L (ref 46–116)
ALT SERPL W P-5'-P-CCNC: 17 U/L (ref 12–78)
ANION GAP SERPL CALCULATED.3IONS-SCNC: 6 MMOL/L (ref 4–13)
AST SERPL W P-5'-P-CCNC: 8 U/L (ref 5–45)
BASOPHILS # BLD AUTO: 0.06 THOUSANDS/ΜL (ref 0–0.1)
BASOPHILS NFR BLD AUTO: 1 % (ref 0–1)
BILIRUB SERPL-MCNC: 0.53 MG/DL (ref 0.2–1)
BUN SERPL-MCNC: 17 MG/DL (ref 5–25)
CALCIUM SERPL-MCNC: 8.3 MG/DL (ref 8.3–10.1)
CHLORIDE SERPL-SCNC: 108 MMOL/L (ref 100–108)
CO2 SERPL-SCNC: 24 MMOL/L (ref 21–32)
CREAT SERPL-MCNC: 0.98 MG/DL (ref 0.6–1.3)
EOSINOPHIL # BLD AUTO: 0.36 THOUSAND/ΜL (ref 0–0.61)
EOSINOPHIL NFR BLD AUTO: 5 % (ref 0–6)
ERYTHROCYTE [DISTWIDTH] IN BLOOD BY AUTOMATED COUNT: 13.2 % (ref 11.6–15.1)
GFR SERPL CREATININE-BSD FRML MDRD: 71 ML/MIN/1.73SQ M
GLUCOSE P FAST SERPL-MCNC: 74 MG/DL (ref 65–99)
HCT VFR BLD AUTO: 43 % (ref 34.8–46.1)
HGB BLD-MCNC: 13.6 G/DL (ref 11.5–15.4)
IMM GRANULOCYTES # BLD AUTO: 0.01 THOUSAND/UL (ref 0–0.2)
IMM GRANULOCYTES NFR BLD AUTO: 0 % (ref 0–2)
LYMPHOCYTES # BLD AUTO: 2.46 THOUSANDS/ΜL (ref 0.6–4.47)
LYMPHOCYTES NFR BLD AUTO: 35 % (ref 14–44)
MCH RBC QN AUTO: 27.1 PG (ref 26.8–34.3)
MCHC RBC AUTO-ENTMCNC: 31.6 G/DL (ref 31.4–37.4)
MCV RBC AUTO: 86 FL (ref 82–98)
MONOCYTES # BLD AUTO: 0.56 THOUSAND/ΜL (ref 0.17–1.22)
MONOCYTES NFR BLD AUTO: 8 % (ref 4–12)
NEUTROPHILS # BLD AUTO: 3.68 THOUSANDS/ΜL (ref 1.85–7.62)
NEUTS SEG NFR BLD AUTO: 51 % (ref 43–75)
NRBC BLD AUTO-RTO: 0 /100 WBCS
PLATELET # BLD AUTO: 313 THOUSANDS/UL (ref 149–390)
PMV BLD AUTO: 11.7 FL (ref 8.9–12.7)
POTASSIUM SERPL-SCNC: 4.3 MMOL/L (ref 3.5–5.3)
PROT SERPL-MCNC: 7.4 G/DL (ref 6.4–8.2)
RBC # BLD AUTO: 5.01 MILLION/UL (ref 3.81–5.12)
SODIUM SERPL-SCNC: 138 MMOL/L (ref 136–145)
TSH SERPL DL<=0.05 MIU/L-ACNC: 2.71 UIU/ML (ref 0.36–3.74)
WBC # BLD AUTO: 7.13 THOUSAND/UL (ref 4.31–10.16)

## 2020-08-14 PROCEDURE — 99214 OFFICE O/P EST MOD 30 MIN: CPT | Performed by: NURSE PRACTITIONER

## 2020-08-14 PROCEDURE — 84443 ASSAY THYROID STIM HORMONE: CPT | Performed by: NURSE PRACTITIONER

## 2020-08-14 PROCEDURE — 36415 COLL VENOUS BLD VENIPUNCTURE: CPT | Performed by: NURSE PRACTITIONER

## 2020-08-14 PROCEDURE — 1036F TOBACCO NON-USER: CPT | Performed by: NURSE PRACTITIONER

## 2020-08-14 PROCEDURE — 85025 COMPLETE CBC W/AUTO DIFF WBC: CPT | Performed by: NURSE PRACTITIONER

## 2020-08-14 PROCEDURE — 80053 COMPREHEN METABOLIC PANEL: CPT | Performed by: NURSE PRACTITIONER

## 2020-08-14 NOTE — PROGRESS NOTES
Assessment/Plan:         Diagnoses and all orders for this visit:    Hypothyroidism, unspecified type  -     TSH, 3rd generation with Free T4 reflex    Other fatigue  -     CBC and differential  -     Comprehensive metabolic panel          Subjective:      Patient ID: Tom Pillai is a 38 y o  female  HPI  Here due to fatigue   Hypothyroidism and no recent labs    Needs to be checked    Issues with menstrual cycle and on new OCP and doing better  The following portions of the patient's history were reviewed and updated as appropriate: allergies, current medications, past family history, past medical history, past social history, past surgical history and problem list     Review of Systems   Constitutional: Positive for fatigue  Negative for activity change, appetite change, fever and unexpected weight change  HENT: Positive for sore throat  Negative for congestion and postnasal drip  Respiratory: Negative for cough  Cardiovascular: Negative for leg swelling  Gastrointestinal: Negative for abdominal pain  Genitourinary: Negative for dysuria  Musculoskeletal: Negative for gait problem  Skin: Negative for rash  Neurological: Negative for dizziness, light-headedness and headaches  Objective:  Vitals:    08/14/20 1041   BP: 130/80   BP Location: Left arm   Patient Position: Sitting   Cuff Size: Large   Pulse: 80   Resp: 18   Temp: 97 5 °F (36 4 °C)   TempSrc: Temporal   Weight: 107 kg (235 lb 9 6 oz)      Physical Exam  Vitals signs reviewed  Constitutional:       Appearance: Normal appearance  She is obese  HENT:      Right Ear: Tympanic membrane, ear canal and external ear normal       Left Ear: Tympanic membrane, ear canal and external ear normal       Nose: Nose normal       Mouth/Throat:      Mouth: Mucous membranes are moist       Pharynx: Oropharynx is clear     Eyes:      Conjunctiva/sclera: Conjunctivae normal    Neck:      Musculoskeletal: Normal range of motion and neck supple  Cardiovascular:      Rate and Rhythm: Normal rate and regular rhythm  Heart sounds: Normal heart sounds  Pulmonary:      Effort: Pulmonary effort is normal       Breath sounds: Normal breath sounds  Abdominal:      General: Bowel sounds are normal    Musculoskeletal: Normal range of motion  Skin:     General: Skin is warm  Capillary Refill: Capillary refill takes less than 2 seconds  Neurological:      Mental Status: She is alert and oriented to person, place, and time     Psychiatric:         Mood and Affect: Mood normal          Behavior: Behavior normal

## 2020-08-24 ENCOUNTER — TELEPHONE (OUTPATIENT)
Dept: FAMILY MEDICINE CLINIC | Facility: CLINIC | Age: 45
End: 2020-08-24

## 2020-08-24 NOTE — TELEPHONE ENCOUNTER
Patient is calling to state that her birth control has been working no bleeding so far she wanted to let you know in person

## 2020-09-03 DIAGNOSIS — E03.9 HYPOTHYROIDISM, UNSPECIFIED TYPE: ICD-10-CM

## 2020-09-03 RX ORDER — LEVOTHYROXINE SODIUM 0.05 MG/1
50 TABLET ORAL DAILY
Qty: 90 TABLET | Refills: 3 | Status: SHIPPED | OUTPATIENT
Start: 2020-09-03 | End: 2021-11-03

## 2020-09-14 ENCOUNTER — CLINICAL SUPPORT (OUTPATIENT)
Dept: FAMILY MEDICINE CLINIC | Facility: CLINIC | Age: 45
End: 2020-09-14
Payer: MEDICARE

## 2020-09-14 DIAGNOSIS — Z23 ENCOUNTER FOR IMMUNIZATION: Primary | ICD-10-CM

## 2020-09-14 PROCEDURE — 90682 RIV4 VACC RECOMBINANT DNA IM: CPT

## 2020-09-14 PROCEDURE — G0008 ADMIN INFLUENZA VIRUS VAC: HCPCS

## 2020-10-14 ENCOUNTER — OFFICE VISIT (OUTPATIENT)
Dept: FAMILY MEDICINE CLINIC | Facility: CLINIC | Age: 45
End: 2020-10-14
Payer: MEDICARE

## 2020-10-14 VITALS
HEART RATE: 82 BPM | RESPIRATION RATE: 16 BRPM | DIASTOLIC BLOOD PRESSURE: 72 MMHG | HEIGHT: 64 IN | OXYGEN SATURATION: 98 % | BODY MASS INDEX: 39.69 KG/M2 | TEMPERATURE: 98.9 F | SYSTOLIC BLOOD PRESSURE: 110 MMHG | WEIGHT: 232.5 LBS

## 2020-10-14 DIAGNOSIS — M85.88 OTHER SPECIFIED DISORDERS OF BONE DENSITY AND STRUCTURE, OTHER SITE: ICD-10-CM

## 2020-10-14 DIAGNOSIS — M25.562 ACUTE PAIN OF LEFT KNEE: ICD-10-CM

## 2020-10-14 DIAGNOSIS — M85.80 OSTEOPENIA, UNSPECIFIED LOCATION: ICD-10-CM

## 2020-10-14 DIAGNOSIS — Z00.00 ENCOUNTER FOR ANNUAL WELLNESS EXAM IN MEDICARE PATIENT: Primary | ICD-10-CM

## 2020-10-14 PROCEDURE — 99214 OFFICE O/P EST MOD 30 MIN: CPT | Performed by: NURSE PRACTITIONER

## 2020-10-14 PROCEDURE — G0439 PPPS, SUBSEQ VISIT: HCPCS | Performed by: NURSE PRACTITIONER

## 2020-10-23 ENCOUNTER — TELEPHONE (OUTPATIENT)
Dept: FAMILY MEDICINE CLINIC | Facility: CLINIC | Age: 45
End: 2020-10-23

## 2020-10-23 DIAGNOSIS — M25.562 ACUTE PAIN OF LEFT KNEE: Primary | ICD-10-CM

## 2020-10-24 DIAGNOSIS — K21.9 GASTROESOPHAGEAL REFLUX DISEASE WITHOUT ESOPHAGITIS: ICD-10-CM

## 2020-10-24 RX ORDER — OMEPRAZOLE 40 MG/1
CAPSULE, DELAYED RELEASE ORAL
Qty: 90 CAPSULE | Refills: 3 | Status: SHIPPED | OUTPATIENT
Start: 2020-10-24 | End: 2021-11-03

## 2020-10-26 RX ORDER — IBUPROFEN 600 MG/1
600 TABLET ORAL EVERY 6 HOURS PRN
Qty: 30 TABLET | Refills: 0 | Status: SHIPPED | OUTPATIENT
Start: 2020-10-26 | End: 2022-04-27 | Stop reason: SDUPTHER

## 2020-10-30 ENCOUNTER — TELEMEDICINE (OUTPATIENT)
Dept: FAMILY MEDICINE CLINIC | Facility: CLINIC | Age: 45
End: 2020-10-30
Payer: MEDICARE

## 2020-10-30 ENCOUNTER — TELEPHONE (OUTPATIENT)
Dept: FAMILY MEDICINE CLINIC | Facility: CLINIC | Age: 45
End: 2020-10-30

## 2020-10-30 VITALS — TEMPERATURE: 98.1 F

## 2020-10-30 DIAGNOSIS — R07.89 LEFT-SIDED CHEST WALL PAIN: Primary | ICD-10-CM

## 2020-10-30 PROCEDURE — 99213 OFFICE O/P EST LOW 20 MIN: CPT | Performed by: NURSE PRACTITIONER

## 2020-11-02 ENCOUNTER — HOSPITAL ENCOUNTER (OUTPATIENT)
Dept: MAMMOGRAPHY | Facility: MEDICAL CENTER | Age: 45
Discharge: HOME | End: 2020-11-02
Payer: MEDICARE

## 2020-11-02 ENCOUNTER — TELEPHONE (OUTPATIENT)
Dept: FAMILY MEDICINE CLINIC | Facility: CLINIC | Age: 45
End: 2020-11-02

## 2020-11-02 VITALS — BODY MASS INDEX: 39.69 KG/M2 | WEIGHT: 232.5 LBS | HEIGHT: 64 IN

## 2020-11-02 DIAGNOSIS — Z12.31 ENCOUNTER FOR SCREENING MAMMOGRAM FOR MALIGNANT NEOPLASM OF BREAST: ICD-10-CM

## 2020-11-02 PROCEDURE — 77067 SCR MAMMO BI INCL CAD: CPT

## 2020-11-02 PROCEDURE — 77063 BREAST TOMOSYNTHESIS BI: CPT

## 2020-11-17 ENCOUNTER — TELEPHONE (OUTPATIENT)
Dept: FAMILY MEDICINE CLINIC | Facility: CLINIC | Age: 45
End: 2020-11-17

## 2020-12-04 DIAGNOSIS — N92.1 BREAKTHROUGH BLEEDING ON BIRTH CONTROL PILLS: ICD-10-CM

## 2020-12-04 RX ORDER — NORETHINDRONE ACETATE AND ETHINYL ESTRADIOL AND FERROUS FUMARATE 1MG-20(21)
KIT ORAL
Qty: 112 TABLET | Refills: 1 | Status: SHIPPED | OUTPATIENT
Start: 2020-12-04 | End: 2021-05-31

## 2020-12-16 ENCOUNTER — ANNUAL EXAM (OUTPATIENT)
Dept: FAMILY MEDICINE CLINIC | Facility: CLINIC | Age: 45
End: 2020-12-16
Payer: MEDICARE

## 2020-12-16 VITALS
DIASTOLIC BLOOD PRESSURE: 72 MMHG | SYSTOLIC BLOOD PRESSURE: 120 MMHG | OXYGEN SATURATION: 99 % | HEART RATE: 74 BPM | TEMPERATURE: 98.8 F | RESPIRATION RATE: 16 BRPM | BODY MASS INDEX: 40.65 KG/M2 | WEIGHT: 238.1 LBS | HEIGHT: 64 IN

## 2020-12-16 DIAGNOSIS — Z12.31 ENCOUNTER FOR SCREENING MAMMOGRAM FOR MALIGNANT NEOPLASM OF BREAST: ICD-10-CM

## 2020-12-16 DIAGNOSIS — Z01.419 WELL FEMALE EXAM WITH ROUTINE GYNECOLOGICAL EXAM: Primary | ICD-10-CM

## 2020-12-16 PROCEDURE — G0101 CA SCREEN;PELVIC/BREAST EXAM: HCPCS | Performed by: NURSE PRACTITIONER

## 2020-12-22 ENCOUNTER — OFFICE VISIT (OUTPATIENT)
Dept: FAMILY MEDICINE CLINIC | Facility: CLINIC | Age: 45
End: 2020-12-22
Payer: MEDICARE

## 2020-12-22 VITALS
HEIGHT: 64 IN | TEMPERATURE: 97.6 F | WEIGHT: 236 LBS | OXYGEN SATURATION: 98 % | SYSTOLIC BLOOD PRESSURE: 124 MMHG | BODY MASS INDEX: 40.29 KG/M2 | RESPIRATION RATE: 20 BRPM | HEART RATE: 83 BPM | DIASTOLIC BLOOD PRESSURE: 78 MMHG

## 2020-12-22 DIAGNOSIS — R46.89 COMPULSIVE BEHAVIORS: Primary | ICD-10-CM

## 2020-12-22 PROCEDURE — 99214 OFFICE O/P EST MOD 30 MIN: CPT | Performed by: NURSE PRACTITIONER

## 2021-02-25 ENCOUNTER — OFFICE VISIT (OUTPATIENT)
Dept: FAMILY MEDICINE CLINIC | Facility: CLINIC | Age: 46
End: 2021-02-25
Payer: MEDICARE

## 2021-02-25 VITALS
OXYGEN SATURATION: 99 % | DIASTOLIC BLOOD PRESSURE: 82 MMHG | WEIGHT: 238 LBS | HEART RATE: 71 BPM | TEMPERATURE: 98 F | SYSTOLIC BLOOD PRESSURE: 130 MMHG | HEIGHT: 64 IN | BODY MASS INDEX: 40.63 KG/M2

## 2021-02-25 DIAGNOSIS — N92.1 BREAKTHROUGH BLEEDING ON BIRTH CONTROL PILLS: Primary | ICD-10-CM

## 2021-02-25 DIAGNOSIS — E66.01 MORBID OBESITY WITH BMI OF 40.0-44.9, ADULT (HCC): ICD-10-CM

## 2021-02-25 PROCEDURE — 99214 OFFICE O/P EST MOD 30 MIN: CPT | Performed by: NURSE PRACTITIONER

## 2021-02-25 NOTE — PROGRESS NOTES
BMI Counseling: Body mass index is 40 85 kg/m²  The BMI is above normal  Nutrition recommendations include reducing portion sizes, decreasing overall calorie intake, 3-5 servings of fruits/vegetables daily, reducing fast food intake, consuming healthier snacks, decreasing soda and/or juice intake, moderation in carbohydrate intake, increasing intake of lean protein, reducing intake of saturated fat and trans fat and reducing intake of cholesterol  Exercise recommendations include moderate aerobic physical activity for 150 minutes/week, exercising 3-5 times per week and joining a gym

## 2021-02-25 NOTE — PROGRESS NOTES
Subjective:   Chief Complaint   Patient presents with    birth control pill issue        Patient ID: Lemuel Sahu is a 41 y o  female  Presents today to discuss issue with birth control  She states that she is having a small amount of bleeding when she wipes that is red and also a brownish color  She is on continuous OCP for 84 days and typically is somewhere around 42nd day she has some breakthrough bleeding  Nothing reaches her pad it is simply on the toilet paper when she wipes  Educated patient on expectations for menstrual bleeding while on birth control  Patient would like to have no breakthrough bleeding  Also discussed with patient healthy diet and exercise to encourage weight loss  She has lost 14 lb in the last year cutting back on sweets, simple carbohydrates and stopping all sugary drinks  She has not been able to lose anything since  We discussed calorie counting and exercise  All questions were asked and answered    More than half of this 20 minute visit spent counseling and coordinating care  More than half of this  minute visit spent counseling and coordinating care  The following portions of the patient's history were reviewed and updated as appropriate: allergies, current medications, past family history, past medical history, past social history, past surgical history and problem list     Review of Systems   Constitutional: Negative for chills, fatigue and fever  Respiratory: Negative for cough and shortness of breath  Cardiovascular: Negative for chest pain  Genitourinary: Positive for vaginal bleeding (breakthrough )  Negative for pelvic pain  Psychiatric/Behavioral: Negative for dysphoric mood  The patient is not nervous/anxious                Objective:  Vitals:    02/25/21 1426   BP: 130/82   BP Location: Left arm   Patient Position: Sitting   Cuff Size: Large   Pulse: 71   Temp: 98 °F (36 7 °C)   TempSrc: Temporal   SpO2: 99%   Weight: 108 kg (238 lb)   Height: 5' 4" (1 626 m)      Physical Exam      Assessment/Plan:    No problem-specific Assessment & Plan notes found for this encounter         Diagnoses and all orders for this visit:    Morbid obesity with BMI of 40 0-44 9, adult ([facility] )

## 2021-04-12 ENCOUNTER — IMMUNIZATIONS (OUTPATIENT)
Dept: FAMILY MEDICINE CLINIC | Facility: HOSPITAL | Age: 46
End: 2021-04-12

## 2021-04-12 DIAGNOSIS — Z23 ENCOUNTER FOR IMMUNIZATION: Primary | ICD-10-CM

## 2021-04-12 PROCEDURE — 0001A SARS-COV-2 / COVID-19 MRNA VACCINE (PFIZER-BIONTECH) 30 MCG: CPT

## 2021-04-12 PROCEDURE — 91300 SARS-COV-2 / COVID-19 MRNA VACCINE (PFIZER-BIONTECH) 30 MCG: CPT

## 2021-05-06 ENCOUNTER — IMMUNIZATIONS (OUTPATIENT)
Dept: FAMILY MEDICINE CLINIC | Facility: HOSPITAL | Age: 46
End: 2021-05-06

## 2021-05-06 DIAGNOSIS — Z23 ENCOUNTER FOR IMMUNIZATION: Primary | ICD-10-CM

## 2021-05-06 PROCEDURE — 91300 SARS-COV-2 / COVID-19 MRNA VACCINE (PFIZER-BIONTECH) 30 MCG: CPT

## 2021-05-06 PROCEDURE — 0002A SARS-COV-2 / COVID-19 MRNA VACCINE (PFIZER-BIONTECH) 30 MCG: CPT

## 2021-05-30 DIAGNOSIS — N92.1 BREAKTHROUGH BLEEDING ON BIRTH CONTROL PILLS: ICD-10-CM

## 2021-05-31 RX ORDER — NORETHINDRONE ACETATE AND ETHINYL ESTRADIOL AND FERROUS FUMARATE 1MG-20(21)
KIT ORAL
Qty: 112 TABLET | Refills: 1 | Status: SHIPPED | OUTPATIENT
Start: 2021-05-31 | End: 2021-11-15

## 2021-06-01 ENCOUNTER — OFFICE VISIT (OUTPATIENT)
Dept: FAMILY MEDICINE CLINIC | Facility: CLINIC | Age: 46
End: 2021-06-01
Payer: MEDICARE

## 2021-06-01 VITALS
WEIGHT: 236.6 LBS | HEIGHT: 64 IN | SYSTOLIC BLOOD PRESSURE: 124 MMHG | BODY MASS INDEX: 40.39 KG/M2 | RESPIRATION RATE: 18 BRPM | OXYGEN SATURATION: 100 % | TEMPERATURE: 98.2 F | HEART RATE: 79 BPM | DIASTOLIC BLOOD PRESSURE: 78 MMHG

## 2021-06-01 DIAGNOSIS — R51.9 SINUS HEADACHE: ICD-10-CM

## 2021-06-01 DIAGNOSIS — F31.81 BIPOLAR II DISORDER (HCC): ICD-10-CM

## 2021-06-01 DIAGNOSIS — R51.9 NONINTRACTABLE EPISODIC HEADACHE, UNSPECIFIED HEADACHE TYPE: Primary | ICD-10-CM

## 2021-06-01 PROCEDURE — 99214 OFFICE O/P EST MOD 30 MIN: CPT | Performed by: NURSE PRACTITIONER

## 2021-06-01 RX ORDER — AMOXICILLIN AND CLAVULANATE POTASSIUM 875; 125 MG/1; MG/1
1 TABLET, FILM COATED ORAL EVERY 12 HOURS SCHEDULED
Qty: 20 TABLET | Refills: 0 | Status: SHIPPED | OUTPATIENT
Start: 2021-06-01 | End: 2021-06-11

## 2021-06-01 NOTE — PROGRESS NOTES
Assessment/Plan:         Diagnoses and all orders for this visit:    Nonintractable episodic headache, unspecified headache type  -     Ambulatory referral to Ophthalmology; Future    Bipolar II disorder (HCC)    Sinus headache  -     amoxicillin-clavulanate (Augmentin) 875-125 mg per tablet; Take 1 tablet by mouth every 12 (twelve) hours for 10 days      will try to set up with eye dr and take treatment for sinuses   If not improving will call me    May then need scan    Subjective:      Patient ID: Lavern Hill is a 41 y o  female  HPI  Started about 1 month ago with headaches    No hx of headaches in the past    No triggers that she can see    Coming out of nowhere  Sometimes starts with lightheaded and sweating    Other times gets headache always frontal    ON waking up can have bad headache     NO vision changes except with lightheadedness    Getting headaches every other day  Starting to get out more    Back at [facility] for 3 weeks   She states it doesn't feel like her sinuses  Happening more than normal   Drinking coke zero and no caffeine   Drinking mainly water  The following portions of the patient's history were reviewed and updated as appropriate: allergies, current medications, past family history, past medical history, past social history, past surgical history and problem list     Review of Systems   Constitutional: Negative for activity change, appetite change, fatigue and fever  HENT: Negative for congestion and sore throat  Gastrointestinal: Negative for constipation, diarrhea and nausea  Genitourinary: Negative for urgency  Neurological: Positive for light-headedness and headaches  Negative for dizziness, weakness and numbness  Psychiatric/Behavioral: The patient is not nervous/anxious            Objective:  Vitals:    06/01/21 1445   BP: 124/78   BP Location: Left arm   Patient Position: Sitting   Cuff Size: Large   Pulse: 79   Resp: 18   Temp: 98 2 °F (36 8 °C) TempSrc: Tympanic   SpO2: 100%   Weight: 107 kg (236 lb 9 6 oz)   Height: 5' 4" (1 626 m)      Physical Exam  Vitals signs reviewed  Constitutional:       Appearance: Normal appearance  She is obese  HENT:      Head:        Right Ear: Ear canal and external ear normal       Left Ear: Tympanic membrane, ear canal and external ear normal    Eyes:      Extraocular Movements: Extraocular movements intact  Conjunctiva/sclera: Conjunctivae normal       Pupils: Pupils are equal, round, and reactive to light  Neck:      Musculoskeletal: Normal range of motion and neck supple  Cardiovascular:      Rate and Rhythm: Normal rate and regular rhythm  Pulses: Normal pulses  Heart sounds: Normal heart sounds  Pulmonary:      Effort: Pulmonary effort is normal    Musculoskeletal: Normal range of motion  Skin:     General: Skin is warm and dry  Neurological:      Mental Status: She is alert and oriented to person, place, and time  Psychiatric:         Mood and Affect: Mood normal          Behavior: Behavior normal          Thought Content:  Thought content normal          Judgment: Judgment normal

## 2021-07-28 ENCOUNTER — OFFICE VISIT (OUTPATIENT)
Dept: FAMILY MEDICINE CLINIC | Facility: CLINIC | Age: 46
End: 2021-07-28
Payer: MEDICARE

## 2021-07-28 VITALS
TEMPERATURE: 99.5 F | SYSTOLIC BLOOD PRESSURE: 122 MMHG | WEIGHT: 235 LBS | HEART RATE: 91 BPM | HEIGHT: 64 IN | RESPIRATION RATE: 16 BRPM | OXYGEN SATURATION: 97 % | DIASTOLIC BLOOD PRESSURE: 80 MMHG | BODY MASS INDEX: 40.12 KG/M2

## 2021-07-28 DIAGNOSIS — T63.481A INSECT STINGS, ACCIDENTAL OR UNINTENTIONAL, INITIAL ENCOUNTER: Primary | ICD-10-CM

## 2021-07-28 PROCEDURE — 99214 OFFICE O/P EST MOD 30 MIN: CPT | Performed by: NURSE PRACTITIONER

## 2021-07-28 NOTE — PROGRESS NOTES
Subjective:   Chief Complaint   Patient presents with    Rash     x 2 days         Patient ID: Hu Dyer is a 41 y o  female  Since today for concern over rash on her left wrist   She did get 2-3 bee stings in that area on Saturday July a 24 and noticed the rash beginning Monday July 26  It does itch a couple of times a day but she has not put anything over-the-counter on it  The following portions of the patient's history were reviewed and updated as appropriate: allergies, current medications, past family history, past medical history, past social history, past surgical history and problem list     Review of Systems   Constitutional: Negative for chills, fatigue and fever  Respiratory: Negative for cough  Cardiovascular: Negative for palpitations  Skin: Positive for rash (left wrist)  Psychiatric/Behavioral: Negative for dysphoric mood  The patient is not nervous/anxious  Objective:  Vitals:    07/28/21 1524   BP: 122/80   BP Location: Left arm   Patient Position: Sitting   Cuff Size: Large   Pulse: 91   Resp: 16   Temp: 99 5 °F (37 5 °C)   TempSrc: Tympanic   SpO2: 97%   Weight: 107 kg (235 lb)   Height: 5' 4" (1 626 m)      Physical Exam  Vitals reviewed  Constitutional:       Appearance: Normal appearance  She is obese  Cardiovascular:      Rate and Rhythm: Normal rate and regular rhythm  Pulses: Normal pulses  Heart sounds: Normal heart sounds  Pulmonary:      Effort: Pulmonary effort is normal       Breath sounds: Normal breath sounds  Skin:     General: Skin is warm and dry  Findings: Erythema and wound present  Neurological:      Mental Status: She is alert and oriented to person, place, and time  Psychiatric:         Mood and Affect: Mood normal          Behavior: Behavior normal            Assessment/Plan:    No problem-specific Assessment & Plan notes found for this encounter         Diagnoses and all orders for this visit:    Insect stings, accidental or unintentional, initial encounter  -     hydrocortisone 2 5 % ointment;  Apply topically 2 (two) times a day

## 2021-10-25 ENCOUNTER — CLINICAL SUPPORT (OUTPATIENT)
Dept: FAMILY MEDICINE CLINIC | Facility: CLINIC | Age: 46
End: 2021-10-25
Payer: MEDICARE

## 2021-10-25 DIAGNOSIS — Z23 ENCOUNTER FOR IMMUNIZATION: Primary | ICD-10-CM

## 2021-10-25 PROCEDURE — 90682 RIV4 VACC RECOMBINANT DNA IM: CPT

## 2021-10-25 PROCEDURE — G0008 ADMIN INFLUENZA VIRUS VAC: HCPCS

## 2021-10-27 ENCOUNTER — OFFICE VISIT (OUTPATIENT)
Dept: FAMILY MEDICINE CLINIC | Facility: CLINIC | Age: 46
End: 2021-10-27
Payer: MEDICARE

## 2021-10-27 VITALS
OXYGEN SATURATION: 95 % | WEIGHT: 226.8 LBS | BODY MASS INDEX: 38.72 KG/M2 | RESPIRATION RATE: 16 BRPM | TEMPERATURE: 99.2 F | SYSTOLIC BLOOD PRESSURE: 134 MMHG | HEART RATE: 88 BPM | DIASTOLIC BLOOD PRESSURE: 82 MMHG | HEIGHT: 64 IN

## 2021-10-27 DIAGNOSIS — Z00.00 WELL ADULT EXAM: ICD-10-CM

## 2021-10-27 DIAGNOSIS — E55.9 VITAMIN D DEFICIENCY: ICD-10-CM

## 2021-10-27 DIAGNOSIS — Z00.00 ENCOUNTER FOR ANNUAL WELLNESS EXAM IN MEDICARE PATIENT: Primary | ICD-10-CM

## 2021-10-27 DIAGNOSIS — E03.8 OTHER SPECIFIED HYPOTHYROIDISM: ICD-10-CM

## 2021-10-27 DIAGNOSIS — Z11.59 ENCOUNTER FOR HEPATITIS C SCREENING TEST FOR LOW RISK PATIENT: ICD-10-CM

## 2021-10-27 DIAGNOSIS — R73.01 IMPAIRED FASTING BLOOD SUGAR: ICD-10-CM

## 2021-10-27 PROCEDURE — G0439 PPPS, SUBSEQ VISIT: HCPCS | Performed by: NURSE PRACTITIONER

## 2021-10-27 PROCEDURE — 99214 OFFICE O/P EST MOD 30 MIN: CPT | Performed by: NURSE PRACTITIONER

## 2021-11-03 DIAGNOSIS — K21.9 GASTROESOPHAGEAL REFLUX DISEASE WITHOUT ESOPHAGITIS: ICD-10-CM

## 2021-11-03 DIAGNOSIS — E03.9 HYPOTHYROIDISM, UNSPECIFIED TYPE: ICD-10-CM

## 2021-11-03 RX ORDER — OMEPRAZOLE 40 MG/1
CAPSULE, DELAYED RELEASE ORAL
Qty: 90 CAPSULE | Refills: 3 | Status: SHIPPED | OUTPATIENT
Start: 2021-11-03

## 2021-11-03 RX ORDER — LEVOTHYROXINE SODIUM 0.05 MG/1
TABLET ORAL
Qty: 90 TABLET | Refills: 3 | Status: SHIPPED | OUTPATIENT
Start: 2021-11-03

## 2021-12-16 ENCOUNTER — OFFICE VISIT (OUTPATIENT)
Dept: FAMILY MEDICINE CLINIC | Facility: CLINIC | Age: 46
End: 2021-12-16
Payer: MEDICARE

## 2021-12-16 VITALS
BODY MASS INDEX: 36.88 KG/M2 | TEMPERATURE: 98.6 F | HEART RATE: 72 BPM | SYSTOLIC BLOOD PRESSURE: 142 MMHG | OXYGEN SATURATION: 100 % | RESPIRATION RATE: 18 BRPM | HEIGHT: 64 IN | DIASTOLIC BLOOD PRESSURE: 90 MMHG | WEIGHT: 216 LBS

## 2021-12-16 DIAGNOSIS — R21 RASH: Primary | ICD-10-CM

## 2021-12-16 PROCEDURE — 99213 OFFICE O/P EST LOW 20 MIN: CPT | Performed by: NURSE PRACTITIONER

## 2021-12-16 RX ORDER — CLOTRIMAZOLE AND BETAMETHASONE DIPROPIONATE 10; .64 MG/G; MG/G
CREAM TOPICAL 2 TIMES DAILY
Qty: 30 G | Refills: 0 | Status: SHIPPED | OUTPATIENT
Start: 2021-12-16 | End: 2022-01-11 | Stop reason: SDUPTHER

## 2021-12-20 ENCOUNTER — ANNUAL EXAM (OUTPATIENT)
Dept: FAMILY MEDICINE CLINIC | Facility: CLINIC | Age: 46
End: 2021-12-20
Payer: MEDICARE

## 2021-12-20 VITALS
DIASTOLIC BLOOD PRESSURE: 82 MMHG | HEIGHT: 64 IN | BODY MASS INDEX: 36.81 KG/M2 | RESPIRATION RATE: 18 BRPM | WEIGHT: 215.6 LBS | HEART RATE: 76 BPM | SYSTOLIC BLOOD PRESSURE: 126 MMHG | OXYGEN SATURATION: 98 % | TEMPERATURE: 98.3 F

## 2021-12-20 DIAGNOSIS — Z01.419 WELL FEMALE EXAM WITH ROUTINE GYNECOLOGICAL EXAM: Primary | ICD-10-CM

## 2021-12-20 DIAGNOSIS — Z30.019 ENCOUNTER FOR FEMALE BIRTH CONTROL: ICD-10-CM

## 2021-12-20 DIAGNOSIS — Z12.31 ENCOUNTER FOR SCREENING MAMMOGRAM FOR MALIGNANT NEOPLASM OF BREAST: ICD-10-CM

## 2021-12-20 DIAGNOSIS — N76.0 ACUTE VAGINITIS: ICD-10-CM

## 2021-12-20 LAB
C GLABRATA DNA VAG QL NAA+PROBE: NEGATIVE
C KRUSEI DNA VAG QL NAA+PROBE: NEGATIVE
CANDIDA SP 6 PNL VAG NAA+PROBE: NEGATIVE
T VAGINALIS DNA VAG QL NAA+PROBE: NEGATIVE
VAGINOSIS/ITIS DNA PNL VAG PROBE+SIG AMP: POSITIVE

## 2021-12-20 PROCEDURE — G0438 PPPS, INITIAL VISIT: HCPCS | Performed by: NURSE PRACTITIONER

## 2021-12-20 PROCEDURE — 81514 NFCT DS BV&VAGINITIS DNA ALG: CPT | Performed by: NURSE PRACTITIONER

## 2021-12-20 RX ORDER — NORETHINDRONE ACETATE AND ETHINYL ESTRADIOL 1MG-20(21)
1 KIT ORAL DAILY
Qty: 112 TABLET | Refills: 0 | Status: SHIPPED | OUTPATIENT
Start: 2021-12-20 | End: 2022-03-14 | Stop reason: SDUPTHER

## 2022-01-11 ENCOUNTER — TELEPHONE (OUTPATIENT)
Dept: FAMILY MEDICINE CLINIC | Facility: CLINIC | Age: 47
End: 2022-01-11

## 2022-01-11 DIAGNOSIS — R21 RASH: ICD-10-CM

## 2022-01-11 RX ORDER — CLOTRIMAZOLE AND BETAMETHASONE DIPROPIONATE 10; .64 MG/G; MG/G
CREAM TOPICAL 2 TIMES DAILY
Qty: 30 G | Refills: 0 | Status: SHIPPED | OUTPATIENT
Start: 2022-01-11 | End: 2022-06-23 | Stop reason: SDUPTHER

## 2022-01-11 NOTE — TELEPHONE ENCOUNTER
Patient called for refill to be sent to [facility] on [address]    clotrimazole-betamethasone (LOTRISONE) 1-0 05 % cream

## 2022-01-26 ENCOUNTER — OFFICE VISIT (OUTPATIENT)
Dept: FAMILY MEDICINE CLINIC | Facility: CLINIC | Age: 47
End: 2022-01-26
Payer: MEDICARE

## 2022-01-26 VITALS
HEART RATE: 94 BPM | OXYGEN SATURATION: 100 % | SYSTOLIC BLOOD PRESSURE: 122 MMHG | WEIGHT: 200.2 LBS | RESPIRATION RATE: 18 BRPM | BODY MASS INDEX: 34.18 KG/M2 | HEIGHT: 64 IN | DIASTOLIC BLOOD PRESSURE: 78 MMHG | TEMPERATURE: 98.2 F

## 2022-01-26 DIAGNOSIS — M21.612 BUNION OF LEFT FOOT: Primary | ICD-10-CM

## 2022-01-26 PROBLEM — E66.01 CLASS 3 SEVERE OBESITY DUE TO EXCESS CALORIES WITHOUT SERIOUS COMORBIDITY WITH BODY MASS INDEX (BMI) OF 40.0 TO 44.9 IN ADULT (HCC): Status: RESOLVED | Noted: 2020-01-02 | Resolved: 2022-01-26

## 2022-01-26 PROBLEM — E66.813 CLASS 3 SEVERE OBESITY DUE TO EXCESS CALORIES WITHOUT SERIOUS COMORBIDITY WITH BODY MASS INDEX (BMI) OF 40.0 TO 44.9 IN ADULT (HCC): Status: RESOLVED | Noted: 2020-01-02 | Resolved: 2022-01-26

## 2022-01-26 PROCEDURE — 99214 OFFICE O/P EST MOD 30 MIN: CPT | Performed by: NURSE PRACTITIONER

## 2022-01-26 NOTE — PATIENT INSTRUCTIONS
Bunion   WHAT YOU NEED TO KNOW:   What is a bunion? A bunion is a bony lump at the base of your big toe  As it grows, it sticks out from the side of your foot and may move your toe out of place  What causes a bunion? Shoes that are too tight, too small, or have high heels are the most common cause of bunions  Arthritis can also cause bunions  Repeated stress on the toes or the front of the foot from sports or other activities can also cause bunions  What are the signs and symptoms of a bunion? · Foot pain and stiffness    · Big toe is turned inward and may overlap other toes    · A callus (thickened skin) at the base of the big toe that may have fluid under it    How is a bunion diagnosed? Your healthcare provider examine your foot  He or she may ask you to move your toe to see how well you can move it  You may need an x-ray to measure the bunion and see how your other toes are affected  How is a bunion treated? · Acetaminophen  decreases pain and fever  It is available without a doctor's order  Ask how much to take and how often to take it  Follow directions  Read the labels of all other medicines you are using to see if they also contain acetaminophen, or ask your doctor or pharmacist  Acetaminophen can cause liver damage if not taken correctly  Do not use more than 4 grams (4,000 milligrams) total of acetaminophen in one day  · NSAIDs , such as ibuprofen, help decrease swelling, pain, and fever  This medicine is available with or without a doctor's order  NSAIDs can cause stomach bleeding or kidney problems in certain people  If you take blood thinner medicine, always ask your healthcare provider if NSAIDs are safe for you  Always read the medicine label and follow directions  · A bunionectomy  is surgery to remove the bunion  You may need surgery if other treatments do not work  How can I manage my symptoms? · Use a bunion pad    Wear a thick, ring-shaped pad around and over the bunion to cushion it  · Wear shoes that fit well  Wear wide, low-heeled shoes that have plenty of room for your toes  Do not wear tight shoes or heels that are higher than 2 inches  · Wear shoe inserts or arch supports  These will decrease pressure on the bunion  · Separate your big toe at night  Separate the big toe from the others with a foam pad while you sleep  Use a light elastic bandage to keep the pad in place  · Stretch your foot each day  This will help decrease pressure and increase foot strength  Ask what foot exercises are best for you  · Apply ice  on your toe for 15 to 20 minutes every hour or as directed  Use an ice pack or put crushed ice in a plastic bag  Cover it with a towel  Ice helps prevent tissue damage and decreases swelling and pain  · Go to physical therapy if directed  A physical therapist teaches you exercises to help improve movement and strength, and to decrease pain  When should I seek immediate care? · You have severe pain in your toe  · You cannot put weight on your foot  When should I call my doctor? · You cannot do your daily activities because of the pain  · You have questions or concerns about your condition or care  CARE AGREEMENT:   You have the right to help plan your care  Learn about your health condition and how it may be treated  Discuss treatment options with your healthcare providers to decide what care you want to receive  You always have the right to refuse treatment  The above information is an  only  It is not intended as medical advice for individual conditions or treatments  Talk to your doctor, nurse or pharmacist before following any medical regimen to see if it is safe and effective for you  © Copyright [employer] 2021 Information is for End User's use only and may not be sold, redistributed or otherwise used for commercial purposes   All illustrations and images included in CareNotes® are the copyrighted property of A D A M , Inc  or [address] [place] [address]

## 2022-01-26 NOTE — PROGRESS NOTES
Subjective:   Chief Complaint   Patient presents with   Radha Eugene     left foot        Patient ID: Katherin Santoyo is a 40 y o  female  Presents today for complaints of left foot bunion  She noticed it several months ago but it has been painful over the last month  It is beginning to affect her walking  She has been walking for weight loss and doesn't want this to interfer       The following portions of the patient's history were reviewed and updated as appropriate: allergies, current medications, past family history, past medical history, past social history, past surgical history and problem list     Review of Systems   Constitutional: Negative for chills, fatigue and fever  Respiratory: Negative for cough and shortness of breath  Cardiovascular: Negative for palpitations  Musculoskeletal: Positive for arthralgias (left foot bunion) and gait problem (secondary to bunion)  Psychiatric/Behavioral: Negative for dysphoric mood  The patient is not nervous/anxious  Objective:  Vitals:    01/26/22 0845   BP: 122/78   BP Location: Left arm   Patient Position: Sitting   Cuff Size: Large   Pulse: 94   Resp: 18   Temp: 98 2 °F (36 8 °C)   TempSrc: Tympanic   SpO2: 100%   Weight: 90 8 kg (200 lb 3 2 oz)   Height: 5' 4" (1 626 m)      Physical Exam  Vitals reviewed  Constitutional:       Appearance: Normal appearance  Cardiovascular:      Rate and Rhythm: Normal rate and regular rhythm  Pulses: Normal pulses  Heart sounds: Normal heart sounds  Pulmonary:      Effort: Pulmonary effort is normal       Breath sounds: Normal breath sounds  Musculoskeletal:      Left foot: Bunion present  Feet:      Right foot:      Skin integrity: Skin integrity normal       Left foot:      Skin integrity: Skin integrity normal    Neurological:      Mental Status: She is alert  Assessment/Plan:    No problem-specific Assessment & Plan notes found for this encounter         Diagnoses and all orders for this visit:    Bunion of left foot  -     Ambulatory Referral to Podiatry;  Future    BMI 34 0-34 9,adult  Comments:  Continue with diet and exercise

## 2022-03-14 DIAGNOSIS — Z30.019 ENCOUNTER FOR FEMALE BIRTH CONTROL: ICD-10-CM

## 2022-03-14 RX ORDER — NORETHINDRONE ACETATE AND ETHINYL ESTRADIOL 1MG-20(21)
1 KIT ORAL DAILY
Qty: 112 TABLET | Refills: 0 | Status: SHIPPED | OUTPATIENT
Start: 2022-03-14 | End: 2022-06-23 | Stop reason: SDUPTHER

## 2022-03-28 ENCOUNTER — OFFICE VISIT (OUTPATIENT)
Dept: FAMILY MEDICINE CLINIC | Facility: CLINIC | Age: 47
End: 2022-03-28
Payer: COMMERCIAL

## 2022-03-28 VITALS
WEIGHT: 189 LBS | DIASTOLIC BLOOD PRESSURE: 78 MMHG | TEMPERATURE: 99.4 F | BODY MASS INDEX: 32.27 KG/M2 | OXYGEN SATURATION: 97 % | SYSTOLIC BLOOD PRESSURE: 124 MMHG | HEART RATE: 73 BPM | HEIGHT: 64 IN | RESPIRATION RATE: 16 BRPM

## 2022-03-28 DIAGNOSIS — L65.9 LOSS OF HAIR: Primary | ICD-10-CM

## 2022-03-28 PROCEDURE — 99213 OFFICE O/P EST LOW 20 MIN: CPT | Performed by: NURSE PRACTITIONER

## 2022-03-28 NOTE — PROGRESS NOTES
Subjective:   Chief Complaint   Patient presents with    Alopecia        Patient ID: Salvador Castle is a 40 y o  female  Patients mother is  concerned that her hair is thinning  Mom insiset she be checked for alopecia  She denies any hair loss  The following portions of the patient's history were reviewed and updated as appropriate: allergies, current medications, past family history, past medical history, past social history, past surgical history and problem list     Review of Systems   Constitutional: Negative for appetite change, diaphoresis and fever  Respiratory: Negative for cough, shortness of breath and wheezing  Cardiovascular: Negative for chest pain and palpitations  Gastrointestinal: Negative for abdominal pain  Genitourinary: Negative for dysuria  Psychiatric/Behavioral: Negative for dysphoric mood  The patient is not nervous/anxious  Objective:  Vitals:    03/28/22 1433   BP: 124/78   BP Location: Left arm   Patient Position: Sitting   Cuff Size: Adult   Pulse: 73   Resp: 16   Temp: 99 4 °F (37 4 °C)   TempSrc: Tympanic   SpO2: 97%   Weight: 85 7 kg (189 lb)   Height: 5' 4" (1 626 m)      Physical Exam  Constitutional:       Appearance: Normal appearance  She is obese  Cardiovascular:      Rate and Rhythm: Normal rate and regular rhythm  Pulses: Normal pulses  Heart sounds: Normal heart sounds  Pulmonary:      Effort: Pulmonary effort is normal       Breath sounds: Normal breath sounds  Musculoskeletal:      Cervical back: Normal range of motion  Skin:     General: Skin is warm  Capillary Refill: Capillary refill takes less than 2 seconds  Comments: Scalp examined no balding or thinning noted   Neurological:      General: No focal deficit present  Mental Status: She is alert and oriented to person, place, and time     Psychiatric:         Mood and Affect: Mood normal          Behavior: Behavior normal  Assessment/Plan:    No problem-specific Assessment & Plan notes found for this encounter  There are no diagnoses linked to this encounter

## 2022-04-01 ENCOUNTER — HOSPITAL ENCOUNTER (OUTPATIENT)
Dept: MAMMOGRAPHY | Facility: CLINIC | Age: 47
Discharge: HOME | End: 2022-04-01
Payer: COMMERCIAL

## 2022-04-01 DIAGNOSIS — Z12.31 ENCOUNTER FOR SCREENING MAMMOGRAM FOR MALIGNANT NEOPLASM OF BREAST: ICD-10-CM

## 2022-04-01 PROCEDURE — 77067 SCR MAMMO BI INCL CAD: CPT

## 2022-04-01 PROCEDURE — 77063 BREAST TOMOSYNTHESIS BI: CPT

## 2022-04-27 DIAGNOSIS — M25.562 ACUTE PAIN OF LEFT KNEE: ICD-10-CM

## 2022-04-27 RX ORDER — IBUPROFEN 600 MG/1
600 TABLET ORAL EVERY 6 HOURS PRN
Qty: 30 TABLET | Refills: 0 | Status: SHIPPED | OUTPATIENT
Start: 2022-04-27

## 2022-05-02 ENCOUNTER — HOSPITAL ENCOUNTER (OUTPATIENT)
Dept: BONE DENSITY | Facility: CLINIC | Age: 47
Discharge: HOME | End: 2022-05-02
Payer: COMMERCIAL

## 2022-05-02 DIAGNOSIS — M85.80 OSTEOPENIA, UNSPECIFIED LOCATION: ICD-10-CM

## 2022-05-02 DIAGNOSIS — Z13.820 SCREENING FOR OSTEOPOROSIS: ICD-10-CM

## 2022-05-02 DIAGNOSIS — M85.88 OTHER SPECIFIED DISORDERS OF BONE DENSITY AND STRUCTURE, OTHER SITE: ICD-10-CM

## 2022-05-02 PROCEDURE — 77080 DXA BONE DENSITY AXIAL: CPT

## 2022-05-05 ENCOUNTER — OFFICE VISIT (OUTPATIENT)
Dept: FAMILY MEDICINE CLINIC | Facility: CLINIC | Age: 47
End: 2022-05-05
Payer: COMMERCIAL

## 2022-05-05 VITALS
WEIGHT: 182.8 LBS | SYSTOLIC BLOOD PRESSURE: 102 MMHG | HEART RATE: 74 BPM | DIASTOLIC BLOOD PRESSURE: 76 MMHG | OXYGEN SATURATION: 97 % | HEIGHT: 64 IN | RESPIRATION RATE: 16 BRPM | TEMPERATURE: 98.4 F | BODY MASS INDEX: 31.21 KG/M2

## 2022-05-05 DIAGNOSIS — J40 BRONCHITIS: Primary | ICD-10-CM

## 2022-05-05 PROCEDURE — 99214 OFFICE O/P EST MOD 30 MIN: CPT | Performed by: FAMILY MEDICINE

## 2022-05-05 RX ORDER — CETIRIZINE HYDROCHLORIDE 10 MG/1
10 TABLET ORAL DAILY
Qty: 60 TABLET | Refills: 0 | Status: SHIPPED | OUTPATIENT
Start: 2022-05-05 | End: 2022-06-27

## 2022-05-05 NOTE — PROGRESS NOTES
Assessment/Plan:      1  Bronchitis  Assessment & Plan:  Resolving  Seen in ed 4/26/22  Treated with azithromycin, prednisone, and albuterol  Continue to have a mild cough and rhinorrhea  Will start zyrtec for rhinorrhea  Discussed ed precautions  Follow up as needed    Orders:  -     cetirizine (ZyrTEC) 10 mg tablet; Take 1 tablet (10 mg total) by mouth daily            Subjective:      Patient ID: Eugene Casas is a 40 y o  female presents today for ED visit  She was seen on 4/26/2022  Today she feels better but not 100%  She denies allergies  HPI    The following portions of the patient's history were reviewed and updated as appropriate: allergies, current medications, past family history, past medical history, past social history, past surgical history and problem list     Review of Systems   Constitutional: Negative for activity change, chills, diaphoresis and fever  HENT: Positive for postnasal drip and rhinorrhea  Negative for ear pain, hearing loss, sinus pressure, sinus pain, sneezing and sore throat  Respiratory: Positive for cough  Negative for chest tightness, shortness of breath and wheezing  Cardiovascular: Negative for chest pain, palpitations and leg swelling  Gastrointestinal: Negative for abdominal pain, blood in stool, constipation, diarrhea, nausea and vomiting  Genitourinary: Negative for dysuria, frequency, hematuria and urgency  Musculoskeletal: Negative for arthralgias and myalgias  Neurological: Negative for dizziness, syncope, weakness, light-headedness, numbness and headaches  Objective:      /76 (BP Location: Left arm, Patient Position: Sitting, Cuff Size: Large)   Pulse 74   Temp 98 4 °F (36 9 °C) (Tympanic)   Resp 16   Ht 5' 4" (1 626 m)   Wt 82 9 kg (182 lb 12 8 oz)   SpO2 97%   BMI 31 38 kg/m²          Physical Exam  Vitals reviewed  Constitutional:       General: She is not in acute distress  Appearance: She is well-developed   She is not diaphoretic  HENT:      Head: Normocephalic and atraumatic  Right Ear: External ear normal       Left Ear: External ear normal       Nose: Congestion and rhinorrhea present  Mouth/Throat:      Mouth: Mucous membranes are moist       Pharynx: Oropharynx is clear  No oropharyngeal exudate  Eyes:      General: No scleral icterus  Right eye: No discharge  Left eye: No discharge  Conjunctiva/sclera: Conjunctivae normal       Pupils: Pupils are equal, round, and reactive to light  Neck:      Thyroid: No thyromegaly  Vascular: No JVD  Trachea: No tracheal deviation  Cardiovascular:      Rate and Rhythm: Normal rate and regular rhythm  Heart sounds: Normal heart sounds  No murmur heard  No friction rub  No gallop  Pulmonary:      Effort: Pulmonary effort is normal  No respiratory distress  Breath sounds: Normal breath sounds  No wheezing or rales  Chest:      Chest wall: No tenderness  Abdominal:      General: Bowel sounds are normal  There is no distension  Palpations: Abdomen is soft  Tenderness: There is no abdominal tenderness  There is no right CVA tenderness, left CVA tenderness, guarding or rebound  Musculoskeletal:         General: Normal range of motion  Cervical back: Normal range of motion and neck supple  No tenderness  Right lower leg: No edema  Left lower leg: No edema  Lymphadenopathy:      Cervical: No cervical adenopathy  Skin:     General: Skin is warm and dry  Neurological:      Mental Status: She is alert and oriented to person, place, and time  Mental status is at baseline  Psychiatric:         Mood and Affect: Mood normal          Behavior: Behavior normal          Thought Content:  Thought content normal          Judgment: Judgment normal

## 2022-05-05 NOTE — ASSESSMENT & PLAN NOTE
Resolving  · Seen in ed 4/26/22  · Treated with azithromycin, prednisone, and albuterol  · Continue to have a mild cough and rhinorrhea  · Will start zyrtec for rhinorrhea  · Discussed ed precautions  · Follow up as needed

## 2022-06-23 DIAGNOSIS — R21 RASH: ICD-10-CM

## 2022-06-23 DIAGNOSIS — Z30.019 ENCOUNTER FOR FEMALE BIRTH CONTROL: ICD-10-CM

## 2022-06-24 RX ORDER — CLOTRIMAZOLE AND BETAMETHASONE DIPROPIONATE 10; .64 MG/G; MG/G
CREAM TOPICAL 2 TIMES DAILY
Qty: 30 G | Refills: 0 | Status: SHIPPED | OUTPATIENT
Start: 2022-06-24

## 2022-06-24 RX ORDER — NORETHINDRONE ACETATE AND ETHINYL ESTRADIOL 1MG-20(21)
1 KIT ORAL DAILY
Qty: 112 TABLET | Refills: 0 | Status: SHIPPED | OUTPATIENT
Start: 2022-06-24

## 2022-06-26 DIAGNOSIS — J40 BRONCHITIS: ICD-10-CM

## 2022-06-27 RX ORDER — CETIRIZINE HYDROCHLORIDE 10 MG/1
TABLET ORAL
Qty: 90 TABLET | Refills: 1 | Status: SHIPPED | OUTPATIENT
Start: 2022-06-27

## 2022-08-16 ENCOUNTER — OFFICE VISIT (OUTPATIENT)
Dept: FAMILY MEDICINE CLINIC | Facility: CLINIC | Age: 47
End: 2022-08-16
Payer: COMMERCIAL

## 2022-08-16 VITALS
BODY MASS INDEX: 28.51 KG/M2 | SYSTOLIC BLOOD PRESSURE: 120 MMHG | OXYGEN SATURATION: 99 % | TEMPERATURE: 98 F | HEART RATE: 61 BPM | HEIGHT: 64 IN | WEIGHT: 167 LBS | DIASTOLIC BLOOD PRESSURE: 78 MMHG

## 2022-08-16 DIAGNOSIS — R21 RASH: Primary | ICD-10-CM

## 2022-08-16 DIAGNOSIS — M54.9 ACUTE BACK PAIN, UNSPECIFIED BACK LOCATION, UNSPECIFIED BACK PAIN LATERALITY: ICD-10-CM

## 2022-08-16 PROCEDURE — 99213 OFFICE O/P EST LOW 20 MIN: CPT | Performed by: NURSE PRACTITIONER

## 2022-08-16 RX ORDER — CEPHALEXIN 500 MG/1
500 CAPSULE ORAL EVERY 6 HOURS SCHEDULED
Qty: 28 CAPSULE | Refills: 0 | Status: SHIPPED | OUTPATIENT
Start: 2022-08-16 | End: 2022-08-24 | Stop reason: SDUPTHER

## 2022-08-16 NOTE — PROGRESS NOTES
Assessment/Plan:         Diagnoses and all orders for this visit:    Rash  -     cephalexin (KEFLEX) 500 mg capsule; Take 1 capsule (500 mg total) by mouth every 6 (six) hours for 7 days    Acute back pain, unspecified back location, unspecified back pain laterality  Comments:  thoracic and lower back pain  Will change walking services  Subjective:      Patient ID: Adebayo Puckett is a 40 y o  female  HPI  Here for was under axilla---- started under right now under left    Went to er  [facility]    New area under left   No pain     backpain and thoracic and lumbar  Pain with walking distances 14miles a day  The following portions of the patient's history were reviewed and updated as appropriate: allergies, current medications, past family history, past medical history, past social history, past surgical history and problem list     Review of Systems   Constitutional: Negative for activity change and appetite change  Musculoskeletal: Positive for back pain  Skin: Positive for rash  Objective:  Vitals:    08/16/22 1347   BP: 120/78   BP Location: Left arm   Patient Position: Sitting   Cuff Size: Adult   Pulse: 61   Temp: 98 °F (36 7 °C)   SpO2: 99%   Weight: 75 8 kg (167 lb)   Height: 5' 4" (1 626 m)      Physical Exam  Vitals reviewed  Constitutional:       Appearance: Normal appearance  Skin:         Neurological:      Mental Status: She is alert

## 2022-08-22 RX ORDER — PREDNISONE 20 MG/1
60 TABLET ORAL DAILY
COMMUNITY
Start: 2022-08-20 | End: 2022-08-25

## 2022-08-23 ENCOUNTER — RA CDI HCC (OUTPATIENT)
Dept: OTHER | Facility: HOSPITAL | Age: 47
End: 2022-08-23

## 2022-08-23 NOTE — PROGRESS NOTES
[facility]  coding opportunities       Chart reviewed, no opportunity found:   [address]        Patients Insurance     Medicare Insurance: [employer]

## 2022-08-24 ENCOUNTER — OFFICE VISIT (OUTPATIENT)
Dept: FAMILY MEDICINE CLINIC | Facility: CLINIC | Age: 47
End: 2022-08-24
Payer: COMMERCIAL

## 2022-08-24 VITALS
OXYGEN SATURATION: 97 % | WEIGHT: 174 LBS | SYSTOLIC BLOOD PRESSURE: 128 MMHG | TEMPERATURE: 98 F | RESPIRATION RATE: 18 BRPM | BODY MASS INDEX: 29.71 KG/M2 | DIASTOLIC BLOOD PRESSURE: 72 MMHG | HEIGHT: 64 IN | HEART RATE: 64 BPM

## 2022-08-24 DIAGNOSIS — B97.89 VIRAL TONSILLITIS: ICD-10-CM

## 2022-08-24 DIAGNOSIS — J03.80 VIRAL TONSILLITIS: ICD-10-CM

## 2022-08-24 DIAGNOSIS — R21 RASH: Primary | ICD-10-CM

## 2022-08-24 DIAGNOSIS — K12.1 MOUTH ULCER: ICD-10-CM

## 2022-08-24 PROCEDURE — 99213 OFFICE O/P EST LOW 20 MIN: CPT | Performed by: NURSE PRACTITIONER

## 2022-08-24 RX ORDER — CEPHALEXIN 500 MG/1
500 CAPSULE ORAL EVERY 6 HOURS SCHEDULED
Qty: 28 CAPSULE | Refills: 0 | Status: SHIPPED | OUTPATIENT
Start: 2022-08-24 | End: 2022-08-31

## 2022-08-24 NOTE — PROGRESS NOTES
Assessment/Plan:         Diagnoses and all orders for this visit:    Rash  -     cephalexin (KEFLEX) 500 mg capsule; Take 1 capsule (500 mg total) by mouth every 6 (six) hours for 7 days          Subjective:      Patient ID: Adebayo Puckett is a 40 y o  female  HPI  Was seen in ER due to tonsilitis which was viral    Given epi pen and prednisone        Rash in axilla are ok    Slight improvement and almost gone but out of keflex     Sore on inside of mouth below teeth  On gum line                The following portions of the patient's history were reviewed and updated as appropriate: allergies, current medications, past family history, past medical history, past social history, past surgical history and problem list     Review of Systems   Constitutional: Negative for activity change, appetite change, fatigue and fever  HENT: Positive for mouth sores  Negative for congestion, postnasal drip, sinus pressure and sneezing  Respiratory: Negative for cough  Gastrointestinal: Negative for constipation and diarrhea  Genitourinary: Negative for frequency and urgency  Neurological: Negative for light-headedness  Psychiatric/Behavioral: The patient is not nervous/anxious  Objective:  Vitals:    08/24/22 1506   BP: 128/72   BP Location: Left arm   Patient Position: Sitting   Cuff Size: Large   Pulse: 64   Resp: 18   Temp: 98 °F (36 7 °C)   TempSrc: Temporal   SpO2: 97%   Weight: 78 9 kg (174 lb)   Height: 5' 4" (1 626 m)      Physical Exam  Vitals reviewed  Constitutional:       Appearance: Normal appearance  HENT:      Right Ear: Tympanic membrane, ear canal and external ear normal       Left Ear: Tympanic membrane, ear canal and external ear normal       Mouth/Throat:      Comments: Inside mouth   Below teeth  Gum line with red area    Ulcer in appearance  Eyes:      Conjunctiva/sclera: Conjunctivae normal    Cardiovascular:      Rate and Rhythm: Normal rate and regular rhythm        Pulses: Normal pulses  Heart sounds: Normal heart sounds  Pulmonary:      Effort: Pulmonary effort is normal       Breath sounds: Normal breath sounds  Musculoskeletal:      Cervical back: Normal range of motion  Skin:         Neurological:      Mental Status: She is alert

## 2022-09-07 DIAGNOSIS — E03.9 HYPOTHYROIDISM, UNSPECIFIED TYPE: ICD-10-CM

## 2022-09-07 DIAGNOSIS — K21.9 GASTROESOPHAGEAL REFLUX DISEASE WITHOUT ESOPHAGITIS: ICD-10-CM

## 2022-09-07 DIAGNOSIS — R21 RASH: ICD-10-CM

## 2022-09-09 RX ORDER — LEVOTHYROXINE SODIUM 0.05 MG/1
50 TABLET ORAL DAILY
Qty: 90 TABLET | Refills: 3 | Status: SHIPPED | OUTPATIENT
Start: 2022-09-09

## 2022-09-09 RX ORDER — OMEPRAZOLE 40 MG/1
40 CAPSULE, DELAYED RELEASE ORAL DAILY
Qty: 90 CAPSULE | Refills: 3 | Status: SHIPPED | OUTPATIENT
Start: 2022-09-09

## 2022-09-09 RX ORDER — CLOTRIMAZOLE AND BETAMETHASONE DIPROPIONATE 10; .64 MG/G; MG/G
CREAM TOPICAL 2 TIMES DAILY
Qty: 30 G | Refills: 0 | Status: SHIPPED | OUTPATIENT
Start: 2022-09-09

## 2022-10-11 DIAGNOSIS — Z30.019 ENCOUNTER FOR FEMALE BIRTH CONTROL: ICD-10-CM

## 2022-10-11 RX ORDER — NORETHINDRONE ACETATE AND ETHINYL ESTRADIOL AND FERROUS FUMARATE 1MG-20(21)
KIT ORAL
Qty: 112 TABLET | Refills: 0 | Status: SHIPPED | OUTPATIENT
Start: 2022-10-11

## 2022-10-12 PROBLEM — Z00.00 WELL ADULT EXAM: Status: RESOLVED | Noted: 2021-10-27 | Resolved: 2022-10-12

## 2022-10-12 PROBLEM — Z00.00 ENCOUNTER FOR ANNUAL WELLNESS EXAM IN MEDICARE PATIENT: Status: RESOLVED | Noted: 2021-10-27 | Resolved: 2022-10-12

## 2022-10-12 PROBLEM — Z01.419 WELL FEMALE EXAM WITH ROUTINE GYNECOLOGICAL EXAM: Status: RESOLVED | Noted: 2020-12-16 | Resolved: 2022-10-12

## 2022-10-18 DIAGNOSIS — M25.562 ACUTE PAIN OF LEFT KNEE: ICD-10-CM

## 2022-10-18 RX ORDER — IBUPROFEN 600 MG/1
600 TABLET ORAL EVERY 6 HOURS PRN
Qty: 30 TABLET | Refills: 0 | Status: SHIPPED | OUTPATIENT
Start: 2022-10-18

## 2022-11-07 ENCOUNTER — TELEMEDICINE (OUTPATIENT)
Dept: FAMILY MEDICINE CLINIC | Facility: CLINIC | Age: 47
End: 2022-11-07

## 2022-11-07 VITALS — TEMPERATURE: 97.4 F

## 2022-11-07 DIAGNOSIS — U07.1 COVID-19 VIRUS INFECTION: Primary | ICD-10-CM

## 2022-11-17 ENCOUNTER — OFFICE VISIT (OUTPATIENT)
Dept: FAMILY MEDICINE CLINIC | Facility: CLINIC | Age: 47
End: 2022-11-17

## 2022-11-17 ENCOUNTER — HOSPITAL ENCOUNTER (OUTPATIENT)
Dept: RADIOLOGY | Facility: HOSPITAL | Age: 47
Discharge: HOME | End: 2022-11-17

## 2022-11-17 VITALS
DIASTOLIC BLOOD PRESSURE: 62 MMHG | HEART RATE: 75 BPM | OXYGEN SATURATION: 98 % | SYSTOLIC BLOOD PRESSURE: 110 MMHG | BODY MASS INDEX: 28.68 KG/M2 | TEMPERATURE: 97.4 F | HEIGHT: 64 IN | WEIGHT: 168 LBS

## 2022-11-17 DIAGNOSIS — R10.9 ACUTE ABDOMINAL PAIN: Primary | ICD-10-CM

## 2022-11-17 DIAGNOSIS — R10.9 ACUTE ABDOMINAL PAIN: ICD-10-CM

## 2022-11-17 RX ADMIN — IOHEXOL 100 ML: 350 INJECTION, SOLUTION INTRAVENOUS at 17:54

## 2022-11-17 NOTE — PROGRESS NOTES
Subjective:   Chief Complaint   Patient presents with   • Abdominal Pain     Patient c/o , excrutiating warm, sensation and pain , x 1 day   • Nutrition Counseling     wants referral to see a dietician        Patient ID: Juanita Burroughs is a 40 y o  female  Presents today with abdominal pain that started yesterday  Pain is an 8/10  Describes it as a sharp pain that is worse today than yesterday  She states that she has not eaten anything different and has not taken anything for the pain  Last bowel movement was this morning  The following portions of the patient's history were reviewed and updated as appropriate: allergies, current medications, past family history, past medical history, past social history, past surgical history and problem list     Review of Systems   Constitutional: Negative for appetite change, chills, fatigue and fever  Respiratory: Negative for cough and shortness of breath  Cardiovascular: Negative for chest pain and palpitations  Gastrointestinal: Positive for abdominal pain (stomach and upper epigastric)  Negative for constipation, diarrhea, nausea and vomiting  Genitourinary: Negative for dysuria and frequency  Psychiatric/Behavioral: Negative for dysphoric mood  The patient is not nervous/anxious  Objective:  Vitals:    11/17/22 1254   BP: 110/62   BP Location: Right arm   Patient Position: Sitting   Cuff Size: Standard   Pulse: 75   Temp: (!) 97 4 °F (36 3 °C)   TempSrc: Tympanic   SpO2: 98%   Weight: 76 2 kg (168 lb)   Height: 5' 4" (1 626 m)      Physical Exam  Vitals reviewed  Constitutional:       Appearance: Normal appearance  She is overweight  Cardiovascular:      Rate and Rhythm: Normal rate and regular rhythm  Pulses: Normal pulses  Heart sounds: Normal heart sounds  Pulmonary:      Effort: Pulmonary effort is normal       Breath sounds: Normal breath sounds  Abdominal:      General: Abdomen is flat   Bowel sounds are normal  There is no distension  Palpations: Abdomen is soft  There is no mass  Tenderness: There is generalized abdominal tenderness (to mild palpation)  There is no right CVA tenderness, left CVA tenderness, guarding or rebound  Skin:     General: Skin is warm and dry  Capillary Refill: Capillary refill takes less than 2 seconds  Neurological:      Mental Status: She is alert  Psychiatric:         Mood and Affect: Mood normal          Behavior: Behavior normal            Assessment/Plan:    No problem-specific Assessment & Plan notes found for this encounter         Diagnoses and all orders for this visit:    Acute abdominal pain  -     CT abdomen pelvis w contrast; Future

## 2022-11-18 ENCOUNTER — RA CDI HCC (OUTPATIENT)
Dept: OTHER | Facility: HOSPITAL | Age: 47
End: 2022-11-18

## 2022-11-18 DIAGNOSIS — R10.9 ACUTE ABDOMINAL PAIN: Primary | ICD-10-CM

## 2022-11-18 RX ORDER — SUCRALFATE ORAL 1 G/10ML
1 SUSPENSION ORAL
Qty: 1200 ML | Refills: 0 | Status: SHIPPED | OUTPATIENT
Start: 2022-11-18 | End: 2022-12-18

## 2022-11-18 NOTE — PROGRESS NOTES
[facility]  coding opportunities       Chart reviewed, no opportunity found:   [address]        Patients Insurance     Medicare Insurance: [employer]

## 2022-11-21 ENCOUNTER — EVALUATION (OUTPATIENT)
Dept: PHYSICAL THERAPY | Facility: CLINIC | Age: 47
End: 2022-11-21

## 2022-11-21 DIAGNOSIS — M26.629 TMJPDS (TEMPOROMANDIBULAR JOINT PAIN DYSFUNCTION SYNDROME): Primary | ICD-10-CM

## 2022-11-21 NOTE — LETTER
2022    Brett Schwartz, DMD  [id number] [facility]    Patient: Ahsan Morataya  YOB: 1975   Date of Visit: 2022      Dear Dr Davila Mean:    One of your patients, Ahsan Morataya, was referred to me by the [facility] program   Please review the attached evaluation summary from [facility] recent visit  Please verify that you agree with the plan of care by signing the attached document and sending it back to our office  If you have any questions or concerns, please don't hesitate to call  Sincerely,    Margaret Molina PT      Primary Care Provider:      I certify that I have read the below Plan of Care and certify the need for these services furnished under this plan of treatment while under my care  Brett Schwartz, JASMIN  [address] [place] [id number]  Via Fax: [phone]           PT Evaluation     Today's date: 2022  Patient name: Ahsan Morataya  : 1975  MRN: [id number]  Referring provider: Dany Carvalho DMD  Dx:   Encounter Diagnosis     ICD-10-CM    1  TMJPDS (temporomandibular joint pain dysfunction syndrome)  M26 529                      Assessment  Assessment details: Pt is a 56y o  year old female presenting to physical therapy for TMJD  She presents with the following impairments: poor posture, cervical ROM deficits, poor neck flexor endurance, stiffness in b/l UT/LS, limited jaw deviation to the right with TTP along b/l TMJ, pain with opening, and C-shaped deviation/deflection to the right affecting her function with talking, chewing, laughing, eating, and has recurrent headaches  Pt will benefit from skilled physical therapy to address functional limitations noted in evaluation and meet patient goals      Impairments: abnormal muscle firing, abnormal or restricted ROM, activity intolerance, impaired physical strength, lacks appropriate home exercise program, pain with function, poor posture  and poor body mechanics    Symptom irritability: moderate  Goals  ST  Pt will reduce pain to 6/10   2  Pt will demonstrate good sitting posture w good scapular retraction  LT  Pt will improve DNF to 30+ seconds for improved postural control  2  Pt will have no C-shaped deviation w no pain w opening to 49mm for improved ability to eat and chew  3  Pt will be I w HEP  Plan  Patient would benefit from: skilled physical therapy and PT eval  Planned modality interventions: biofeedback, electrical stimulation/Russian stimulation, cryotherapy, TENS, thermotherapy: hydrocollator packs and unattended electrical stimulation  Planned therapy interventions: joint mobilization, manual therapy, abdominal trunk stabilization, neuromuscular re-education, patient education, strengthening, stretching, therapeutic activities, therapeutic exercise, flexibility, functional ROM exercises, home exercise program, postural training and body mechanics training  Frequency: 1x week  Duration in weeks: 6  Treatment plan discussed with: patient        Subjective Evaluation    History of Present Illness  Mechanism of injury: Pt reports onset of jaw pain, clicking, and headaches about 6 weeks ago  She denies chewing gum, but has a lot pain when biting hard candy  She reports pain with chewing, laughing, and talking, and reports a current headache that is particularly bad today  She takes excedrin or ibuprofen for pain as needed  Denies any trauma or injury  Reports some numbness and tingling in her face and neck occasionally  She is aware that she has poor posture    Pain  Current pain ratin  At worst pain ratin          Objective     Postural Observations  Seated posture: poor  Standing posture: poor  Correction of posture: makes symptoms better    Additional Postural Observation Details  Poor posture with severe forward head and rounded shoulders    Palpation   Left   Hypertonic in the levator scapulae, scalenes and upper trapezius  Tenderness of the levator scapulae, scalenes, suboccipitals and upper trapezius  Right   Hypertonic in the levator scapulae, scalenes and upper trapezius  Tenderness of the levator scapulae, scalenes, suboccipitals and upper trapezius  Active Range of Motion   Cervical/Thoracic Spine       Cervical    Flexion:  with pain Restriction level: minimal  Extension:  WFL and with pain  Left lateral flexion:  WFL and with pain  Right lateral flexion:  WFL and with pain  Left rotation:  WFL and with pain  Right rotation:  WFL and with pain    Additional Active Range of Motion Details  11mm L deviation w pain  9mm R deviation  49mm opening w pain  (C shaped deviation to the R)    Joint Play   Joints within functional limits: C1, C2, C3, C4 and C5     Hypomobile: C6, C7, T1 and T2     Pain: C6, C7 and T1     Tests   Cervical     Left   Positive Spurling's Test A  Right   Positive Spurling's Test A  Left Shoulder   Negative ULTT1  Right Shoulder   Negative ULTT1               Precautions:     Date 11/21            Visit # 1            FOTO IE             Re-eval IE              Manuals 11/21            SOR SF **            Cervical PROM SF            UT/LS str w STM             C/S Mobs             Neuro Re-Ed             Cervical retractions 5x10"            Scapular retractions 5x10"            DNF 3x20"            No Money 10x Pink TB            Disc recapture SF                                      Ther Ex             UBE             Rows/ext             Pec str 2x30"            Jaw iso's 4 way 10" ea            Popsicle gapping NV                                                   Ther Activity                                       Gait Training                                       Modalities

## 2022-11-21 NOTE — PROGRESS NOTES
PT Evaluation     Today's date: 2022  Patient name: Deepali Rascon  : 1975  MRN: [id number]  Referring provider: Makenzie Marmolejo DMD  Dx:   Encounter Diagnosis     ICD-10-CM    1  TMJPDS (temporomandibular joint pain dysfunction syndrome)  M26 629                      Assessment  Assessment details: Pt is a 56y o  year old female presenting to physical therapy for TMJD  She presents with the following impairments: poor posture, cervical ROM deficits, poor neck flexor endurance, stiffness in b/l UT/LS, limited jaw deviation to the right with TTP along b/l TMJ, pain with opening, and C-shaped deviation/deflection to the right affecting her function with talking, chewing, laughing, eating, and has recurrent headaches  Pt will benefit from skilled physical therapy to address functional limitations noted in evaluation and meet patient goals  Impairments: abnormal muscle firing, abnormal or restricted ROM, activity intolerance, impaired physical strength, lacks appropriate home exercise program, pain with function, poor posture  and poor body mechanics    Symptom irritability: moderate  Goals  ST  Pt will reduce pain to 6/10   2  Pt will demonstrate good sitting posture w good scapular retraction  LT  Pt will improve DNF to 30+ seconds for improved postural control  2  Pt will have no C-shaped deviation w no pain w opening to 49mm for improved ability to eat and chew  3  Pt will be I w HEP      Plan  Patient would benefit from: skilled physical therapy and PT eval  Planned modality interventions: biofeedback, electrical stimulation/Russian stimulation, cryotherapy, TENS, thermotherapy: hydrocollator packs and unattended electrical stimulation  Planned therapy interventions: joint mobilization, manual therapy, abdominal trunk stabilization, neuromuscular re-education, patient education, strengthening, stretching, therapeutic activities, therapeutic exercise, flexibility, functional ROM exercises, home exercise program, postural training and body mechanics training  Frequency: 1x week  Duration in weeks: 6  Treatment plan discussed with: patient        Subjective Evaluation    History of Present Illness  Mechanism of injury: Pt reports onset of jaw pain, clicking, and headaches about 6 weeks ago  She denies chewing gum, but has a lot pain when biting hard candy  She reports pain with chewing, laughing, and talking, and reports a current headache that is particularly bad today  She takes excedrin or ibuprofen for pain as needed  Denies any trauma or injury  Reports some numbness and tingling in her face and neck occasionally  She is aware that she has poor posture  Pain  Current pain ratin  At worst pain ratin          Objective     Postural Observations  Seated posture: poor  Standing posture: poor  Correction of posture: makes symptoms better    Additional Postural Observation Details  Poor posture with severe forward head and rounded shoulders    Palpation   Left   Hypertonic in the levator scapulae, scalenes and upper trapezius  Tenderness of the levator scapulae, scalenes, suboccipitals and upper trapezius  Right   Hypertonic in the levator scapulae, scalenes and upper trapezius  Tenderness of the levator scapulae, scalenes, suboccipitals and upper trapezius       Active Range of Motion   Cervical/Thoracic Spine       Cervical    Flexion:  with pain Restriction level: minimal  Extension:  WFL and with pain  Left lateral flexion:  WFL and with pain  Right lateral flexion:  WFL and with pain  Left rotation:  WFL and with pain  Right rotation:  WFL and with pain    Additional Active Range of Motion Details  11mm L deviation w pain  9mm R deviation  49mm opening w pain  (C shaped deviation to the R)    Joint Play   Joints within functional limits: C1, C2, C3, C4 and C5     Hypomobile: C6, C7, T1 and T2     Pain: C6, C7 and T1     Tests   Cervical     Left   Positive Spurling's Test A  Right   Positive Spurling's Test A  Left Shoulder   Negative ULTT1  Right Shoulder   Negative ULTT1                Precautions:     Date 11/21            Visit # 1            FOTO IE             Re-eval IE              Manuals 11/21            SOR SF **            Cervical PROM SF            UT/LS str w STM             C/S Mobs             Neuro Re-Ed             Cervical retractions 5x10"            Scapular retractions 5x10"            DNF 3x20"            No Money 10x Pink TB            Disc recapture SF                                      Ther Ex             UBE             Rows/ext             Pec str 2x30"            Jaw iso's 4 way 10" ea            Popsicle gapping NV                                                   Ther Activity                                       Gait Training                                       Modalities

## 2022-11-22 ENCOUNTER — OFFICE VISIT (OUTPATIENT)
Dept: FAMILY MEDICINE CLINIC | Facility: CLINIC | Age: 47
End: 2022-11-22

## 2022-11-22 VITALS
SYSTOLIC BLOOD PRESSURE: 110 MMHG | RESPIRATION RATE: 18 BRPM | HEIGHT: 64 IN | WEIGHT: 160.2 LBS | BODY MASS INDEX: 27.35 KG/M2 | DIASTOLIC BLOOD PRESSURE: 70 MMHG | TEMPERATURE: 98.2 F | HEART RATE: 72 BPM | OXYGEN SATURATION: 99 %

## 2022-11-22 DIAGNOSIS — R10.9 ACUTE ABDOMINAL PAIN: Primary | ICD-10-CM

## 2022-11-22 NOTE — PROGRESS NOTES
Subjective:   Chief Complaint   Patient presents with   • Follow-up     Discuss nutritionist         Patient ID: Eugene Casas is a 56 y o  female  Patient presents today for follow up with abdominal pain and referral for nutritionist  She has been taking Sucrafate that was prescribed last visit, which has helped pain  She denies pain, nausea, vomiting, diarrhea or constipation  No urinary symptoms  The CT scan of her abdomen and pelvis on 11/17/22 was normal without any incidental findings  She would like a referral for a nutritionist to keep weight balanced  The following portions of the patient's history were reviewed and updated as appropriate: allergies, current medications, past family history, past medical history, past social history, past surgical history and problem list     Review of Systems   Constitutional: Negative for chills, fatigue and fever  Respiratory: Negative for cough and shortness of breath  Cardiovascular: Negative for chest pain and palpitations  Gastrointestinal: Negative for abdominal pain, diarrhea, nausea and vomiting  Genitourinary: Negative for difficulty urinating, dysuria, hematuria and pelvic pain  Psychiatric/Behavioral: Negative for dysphoric mood  The patient is not nervous/anxious  Objective:  Vitals:    11/22/22 1457   BP: 110/70   BP Location: Left arm   Patient Position: Sitting   Cuff Size: Standard   Pulse: 72   Resp: 18   Temp: 98 2 °F (36 8 °C)   TempSrc: Temporal   SpO2: 99%   Weight: 72 7 kg (160 lb 3 2 oz)   Height: 5' 4" (1 626 m)      Physical Exam  Constitutional:       Appearance: Normal appearance  Cardiovascular:      Rate and Rhythm: Normal rate and regular rhythm  Pulses: Normal pulses  Heart sounds: Normal heart sounds  Pulmonary:      Effort: Pulmonary effort is normal       Breath sounds: Normal breath sounds  Abdominal:      General: Abdomen is flat  Bowel sounds are normal  There is no distension  Palpations: Abdomen is soft  There is no mass  Tenderness: There is no abdominal tenderness  There is no right CVA tenderness, left CVA tenderness, guarding or rebound  Hernia: No hernia is present  Skin:     General: Skin is warm and dry  Capillary Refill: Capillary refill takes less than 2 seconds  Neurological:      Mental Status: She is alert and oriented to person, place, and time  Psychiatric:         Mood and Affect: Mood normal          Behavior: Behavior normal            Assessment/Plan:    No problem-specific Assessment & Plan notes found for this encounter  Diagnoses and all orders for this visit:    BMI 27 0-27 9,adult  -     Ambulatory Referral to Nutrition Services;  Future

## 2022-11-22 NOTE — PATIENT INSTRUCTIONS
Diet for Stomach Ulcers and Gastritis   WHAT YOU NEED TO KNOW:   A diet for stomach ulcers and gastritis is a meal plan that limits foods that irritate your stomach  Certain foods may worsen symptoms such as stomach pain, bloating, heartburn, or indigestion  DISCHARGE INSTRUCTIONS:   Foods to limit or avoid:  You may need to avoid acidic, spicy, or high-fat foods  Not all foods affect everyone the same way  You will need to learn which foods worsen your symptoms and limit those foods  The following are some foods that may worsen ulcer or gastritis symptoms:  Beverages:      Whole milk and chocolate milk    Hot cocoa and cola    Any beverage with caffeine    Regular and decaffeinated coffee    Peppermint and spearmint tea    Green and black tea, with or without caffeine    Orange and grapefruit juices    Drinks that contain alcohol    Spices and seasonings:      Black and red pepper    Chili powder    Mustard seed and nutmeg    Other foods:      Dairy foods made from whole milk or cream    Chocolate    Spicy or strongly flavored cheeses, such as jalapeno or black pepper    Highly seasoned, high-fat meats, such as sausage, salami, giles, ham, and cold cuts    Hot chiles and peppers    Tomato products, such as tomato paste, tomato sauce, or tomato juice    Foods to include:  Eat a variety of healthy foods from all the food groups  Eat fruits, vegetables, whole grains, and fat-free or low-fat dairy foods  Whole grains include whole-wheat breads, cereals, pasta, and brown rice  Choose lean meats, poultry (chicken and turkey), fish, beans, eggs, and nuts  A healthy meal plan is low in unhealthy fats, salt, and added sugar  Healthy fats include olive oil and canola oil  Ask your dietitian for more information about a healthy diet  Other helpful guidelines:   Do not eat right before bedtime  Stop eating at least 2 hours before bedtime  Eat small, frequent meals    Your stomach may tolerate small, frequent meals better than large meals  © Copyright [employer] 2022 Information is for End User's use only and may not be sold, redistributed or otherwise used for commercial purposes  All illustrations and images included in CareNotes® are the copyrighted property of A D A M , Inc  or [employer]  The above information is an  only  It is not intended as medical advice for individual conditions or treatments  Talk to your doctor, nurse or pharmacist before following any medical regimen to see if it is safe and effective for you

## 2022-12-16 ENCOUNTER — RA CDI HCC (OUTPATIENT)
Dept: OTHER | Facility: HOSPITAL | Age: 47
End: 2022-12-16

## 2022-12-16 NOTE — PROGRESS NOTES
[facility]  coding opportunities       Chart reviewed, no opportunity found:   [address]        Patients Insurance     Medicare Insurance: [employer]

## 2022-12-21 ENCOUNTER — ANNUAL EXAM (OUTPATIENT)
Dept: FAMILY MEDICINE CLINIC | Facility: CLINIC | Age: 47
End: 2022-12-21

## 2022-12-21 VITALS
HEART RATE: 64 BPM | BODY MASS INDEX: 26.88 KG/M2 | OXYGEN SATURATION: 97 % | WEIGHT: 156.6 LBS | SYSTOLIC BLOOD PRESSURE: 125 MMHG | DIASTOLIC BLOOD PRESSURE: 80 MMHG | TEMPERATURE: 98.4 F | RESPIRATION RATE: 18 BRPM

## 2022-12-21 DIAGNOSIS — Z01.419 WOMEN'S ANNUAL ROUTINE GYNECOLOGICAL EXAMINATION: Primary | ICD-10-CM

## 2022-12-21 DIAGNOSIS — Z12.31 ENCOUNTER FOR SCREENING MAMMOGRAM FOR MALIGNANT NEOPLASM OF BREAST: ICD-10-CM

## 2022-12-21 DIAGNOSIS — Z30.019 ENCOUNTER FOR FEMALE BIRTH CONTROL: ICD-10-CM

## 2022-12-21 DIAGNOSIS — Z12.11 ENCOUNTER FOR SCREENING FOR MALIGNANT NEOPLASM OF COLON: ICD-10-CM

## 2022-12-21 RX ORDER — NORETHINDRONE ACETATE AND ETHINYL ESTRADIOL 1MG-20(21)
1 KIT ORAL DAILY
Qty: 91 TABLET | Refills: 1 | Status: SHIPPED | OUTPATIENT
Start: 2022-12-21 | End: 2023-06-21

## 2022-12-21 NOTE — PROGRESS NOTES
Subjective     Henrik Alvarenga is a 56 y o   female and is here for routine health maintenance  The patient reports no problems  History of Present Illness     HPI    Well Adult Physical   Patient here for a Gynecological exam       Diet and Physical Activity  Diet: well balanced diet  Weight concerns: Patient is overweight (BMI 25 0-29  9)  Exercise: daily      Depression Screen  PHQ-2/9 Depression Screening    Little interest or pleasure in doing things: 0 - not at all  Feeling down, depressed, or hopeless: 0 - not at all  Trouble falling or staying asleep, or sleeping too much: 0 - not at all  Feeling tired or having little energy: 0 - not at all  Poor appetite or overeatin - not at all  Feeling bad about yourself - or that you are a failure or have let yourself or your family down: 0 - not at all  Trouble concentrating on things, such as reading the newspaper or watching television: 0 - not at all  Moving or speaking so slowly that other people could have noticed  Or the opposite - being so fidgety or restless that you have been moving around a lot more than usual: 0 - not at all  Thoughts that you would be better off dead, or of hurting yourself in some way: 0 - not at all  PHQ-9 Score: 0   PHQ-9 Interpretation: No or Minimal depression            History:  LMP: 10/2022  Menses regular  yes  Cycle Length every 3 months due to OCP's  Flow normal  Menorrhagia  no  Dysmenorrhea   no  : 3  Para: 2  Breast Fed no  Bottle Fed yes  Both not asked   Screening  Pap 2018  Abnormal pap? no  STI yes GC in her 19's  [employer] [facility]    The following portions of the patient's history were reviewed and updated as appropriate: allergies, current medications, past family history, past medical history, past social history, past surgical history and problem list     Review of Systems     Review of Systems   Constitutional: Negative for chills, fatigue and fever  Cardiovascular: Negative for leg swelling  Gastrointestinal: Negative for abdominal pain  Genitourinary: Negative for dyspareunia, dysuria, frequency, genital sores, menstrual problem, pelvic pain, urgency, vaginal bleeding, vaginal discharge and vaginal pain  Skin: Negative for rash  Hematological: Negative for adenopathy  Psychiatric/Behavioral: Negative for dysphoric mood  The patient is not nervous/anxious          Breast ROS: negative for breast lumps  Self Breast Exam yes  Genito-Urinary ROS: no dysuria, trouble voiding, or hematuria  Sexually Active yes  Birth Control Method OCP  Sexual Dysfunction  no  Vitamin D yes    Past Medical History     Past Medical History:   Diagnosis Date   • Bipolar II disorder ([facility] [address] ) 2015   • Class 3 severe obesity due to excess calories without serious comorbidity with body mass index (BMI) of 40 0 to 44 9 in adult [facility]) 2020   • Disease of thyroid gland    • GERD (gastroesophageal reflux disease)    • Primary osteoarthritis of left knee 2020       Past Surgical History     Past Surgical History:   Procedure Laterality Date   •  SECTION  2004    PREGNANCY AT TERM       Social History     Social History     Socioeconomic History   • Marital status: Single     Spouse name: None   • Number of children: 1   • Years of education: None   • Highest education level: None   Occupational History   • None   Tobacco Use   • Smoking status: Never   • Smokeless tobacco: Never   • Tobacco comments:     NO TOBACCO USE   Vaping Use   • Vaping Use: Never used   Substance and Sexual Activity   • Alcohol use: No   • Drug use: No   • Sexual activity: Never     Birth control/protection: Other     Comment: Amneal   Other Topics Concern   • None   Social History Narrative   • None     Social Determinants of Health     Financial Resource Strain: Not on file   Food Insecurity: Not on file   Transportation Needs: Not on file   Physical Activity: Not on file   Stress: Not on file   Social Connections: Not on file Intimate Partner Violence: Not on file   Housing Stability: Not on file       Family History     Family History   Problem Relation Age of Onset   • Thyroid disease Mother         DISORDER   • Lung cancer Father    • Diabetes Father         MELLITUS   • Hypertension Father    • No Known Problems Daughter    • No Known Problems Maternal Grandmother    • No Known Problems Maternal Grandfather    • No Known Problems Paternal Grandmother    • No Known Problems Paternal Grandfather    • No Known Problems Maternal Aunt    • No Known Problems Maternal Aunt        Current Medications       Current Outpatient Medications:   •  calcium carbonate (OS-AUGIE) 600 MG tablet, every 24 hours, Disp: , Rfl:   •  cetirizine (ZyrTEC) 10 mg tablet, TAKE 1 TABLET BY MOUTH EVERY DAY, Disp: 90 tablet, Rfl: 1  •  Cholecalciferol 5000 units TABS, take 1 capsule by oral route  every day start after completing weekly dose, Disp: , Rfl:   •  clotrimazole-betamethasone (LOTRISONE) 1-0 05 % cream, Apply topically 2 (two) times a day, Disp: 30 g, Rfl: 0  •  ibuprofen (MOTRIN) 600 mg tablet, Take 1 tablet (600 mg total) by mouth every 6 (six) hours as needed for mild pain, Disp: 30 tablet, Rfl: 0  •  levothyroxine 50 mcg tablet, Take 1 tablet (50 mcg total) by mouth daily, Disp: 90 tablet, Rfl: 3  •  norethindrone-ethinyl estradiol (Junel FE 1/20) 1-20 MG-MCG per tablet, Take 1 tablet by mouth daily For 84 days then placebo for 7 days, Disp: 91 tablet, Rfl: 1  •  omeprazole (PriLOSEC) 40 MG capsule, Take 1 capsule (40 mg total) by mouth daily, Disp: 90 capsule, Rfl: 3  •  sucralfate (CARAFATE) 1 g/10 mL suspension, Take 10 mL (1 g total) by mouth 4 (four) times a day (with meals and at bedtime), Disp: 1200 mL, Rfl: 0     Allergies     Allergies   Allergen Reactions   • No Active Allergies        Objective     /80 (BP Location: Left arm, Patient Position: Sitting, Cuff Size: Standard)   Pulse 64   Temp 98 4 °F (36 9 °C) (Temporal)   Resp 18 Wt 71 kg (156 lb 9 6 oz)   SpO2 97%   BMI 26 88 kg/m²      Physical Exam  Vitals reviewed  Exam conducted with a chaperone present  Constitutional:       Appearance: Normal appearance  She is well-developed and overweight  Neck:      Thyroid: No thyromegaly  Cardiovascular:      Rate and Rhythm: Normal rate and regular rhythm  Heart sounds: Normal heart sounds  Pulmonary:      Effort: Pulmonary effort is normal       Breath sounds: Normal breath sounds  Chest:   Breasts: Mo Score is 5  Breasts are symmetrical       Right: Normal  No swelling, bleeding, inverted nipple, mass, nipple discharge, skin change or tenderness  Left: Normal  No swelling, bleeding, inverted nipple, mass, nipple discharge, skin change or tenderness  Abdominal:      Tenderness: There is no abdominal tenderness  Genitourinary:     General: Normal vulva  Mo stage (genital): 5  Labia:         Right: No rash, tenderness, lesion or injury  Left: No rash, tenderness, lesion or injury  Vagina: No vaginal discharge  Cervix: No cervical motion tenderness, discharge or friability  Uterus: Normal        Adnexa: Right adnexa normal and left adnexa normal         Right: No mass, tenderness or fullness  Left: No mass, tenderness or fullness  Rectum: Normal    Musculoskeletal:      Cervical back: Normal range of motion and neck supple  Lymphadenopathy:      Cervical: No cervical adenopathy  Skin:     General: Skin is warm and dry  Neurological:      Mental Status: She is alert and oriented to person, place, and time  Psychiatric:         Behavior: Behavior normal            No results found      Health Maintenance     Health Maintenance   Topic Date Due   • Colorectal Cancer Screening  Never done   • Medicare Annual Wellness Visit (AWV)  10/27/2022   • BMI: Followup Plan  10/27/2022   • PT PLAN OF CARE  12/21/2022   • Influenza Vaccine (1) 06/30/2023 (Originally 9/1/2022)   • Hepatitis B Vaccine (1 of 3 - 3-dose series) 01/21/2026 (Originally 1975)   • Breast Cancer Screening: Mammogram  04/01/2023   • Cervical Cancer Screening  11/06/2023   • BMI: Adult  12/21/2023   • HIV Screening  Completed   • Hepatitis C Screening  Completed   • COVID-19 Vaccine  Completed   • Pneumococcal Vaccine: Pediatrics (0 to 5 Years) and At-Risk Patients (6 to 59 Years)  Aged Out   • HIB Vaccine  Aged Out   • IPV Vaccine  Aged Out   • Hepatitis A Vaccine  Aged Out   • Meningococcal ACWY Vaccine  Aged Out   • HPV Vaccine  Aged [employer] History   Administered Date(s) Administered   • COVID-19 PFIZER VACCINE 0 3 ML IM 04/12/2021, 05/06/2021, 11/26/2021   • Fluzone Split Quad 0 25 mL 10/05/2016   • INFLUENZA 10/05/2016, 09/27/2017, 09/27/2017   • Influenza, injectable, quadrivalent, preservative free 0 5 mL 09/21/2018   • Influenza, recombinant, quadrivalent,injectable, preservative free 09/25/2019, 09/14/2020, 10/25/2021   • TD (adult) Preservative Free 02/17/2020   • Tdap 09/22/2009       Assessment/Plan   1  Women's annual routine gynecological examination  - Liquid-based pap, screening    2  Encounter for female birth control  - norethindrone-ethinyl estradiol (Junel FE 1/20) 1-20 MG-MCG per tablet; Take 1 tablet by mouth daily For 84 days then placebo for 7 days  Dispense: 91 tablet; Refill: 1    3  Encounter for screening mammogram for malignant neoplasm of breast   - Mammo screening bilateral w 3d & cad; Future    4  Encounter for screening for malignant neoplasm of colon   - Ambulatory referral for colonoscopy;  Future          MIKI Carrillo

## 2022-12-22 ENCOUNTER — OFFICE VISIT (OUTPATIENT)
Dept: FAMILY MEDICINE CLINIC | Facility: CLINIC | Age: 47
End: 2022-12-22

## 2022-12-22 VITALS
BODY MASS INDEX: 26.67 KG/M2 | DIASTOLIC BLOOD PRESSURE: 70 MMHG | HEIGHT: 64 IN | HEART RATE: 73 BPM | OXYGEN SATURATION: 98 % | TEMPERATURE: 97.8 F | WEIGHT: 156.2 LBS | SYSTOLIC BLOOD PRESSURE: 110 MMHG

## 2022-12-22 DIAGNOSIS — E55.9 VITAMIN D DEFICIENCY: ICD-10-CM

## 2022-12-22 DIAGNOSIS — E03.8 OTHER SPECIFIED HYPOTHYROIDISM: ICD-10-CM

## 2022-12-22 DIAGNOSIS — K21.9 GASTROESOPHAGEAL REFLUX DISEASE WITHOUT ESOPHAGITIS: ICD-10-CM

## 2022-12-22 DIAGNOSIS — Z00.00 MEDICARE ANNUAL WELLNESS VISIT, SUBSEQUENT: Primary | ICD-10-CM

## 2022-12-22 DIAGNOSIS — E78.00 ELEVATED CHOLESTEROL: ICD-10-CM

## 2022-12-22 LAB
HPV HR 12 DNA CVX QL NAA+PROBE: POSITIVE
HPV16 DNA CVX QL NAA+PROBE: NEGATIVE
HPV18 DNA CVX QL NAA+PROBE: NEGATIVE

## 2022-12-22 NOTE — PROGRESS NOTES
Assessment and Plan:     Problem List Items Addressed This Visit        Digestive    GERD (gastroesophageal reflux disease)       Endocrine    Hypothyroidism    Relevant Orders    TSH, 3rd generation with Free T4 reflex       Other    BMI 26 0-26 9,adult    Vitamin D deficiency    Relevant Orders    Vitamin D 25 hydroxy    Elevated cholesterol    Relevant Orders    Lipid panel    Medicare annual wellness visit, subsequent - Primary    Relevant Orders    CBC and differential    Comprehensive metabolic panel        Preventive health issues were discussed with patient, and age appropriate screening tests were ordered as noted in patient's After Visit Summary  Personalized health advice and appropriate referrals for health education or preventive services given if needed, as noted in patient's After Visit Summary  History of Present Illness:     Patient presents for a Medicare Wellness Visit    Today for Medicare annual wellness and follow-up on chronic conditions  She continues to take levothyroxine 50 mcg daily with last TSH in October 2021 at 2 33  She is currently taking her levothyroxine with her omeprazole 40 mg for GERD  Discussed with her levothyroxine should be taken by itself on an empty stomach with no other medication  The omeprazole 40 mg is controlling her GERD  Patient has been on a diet and exercise plan for over the last year with approximately 80+ pound weight loss  Encouraged patient to continue with her diet and exercise plan  Her last lipid panel in October 2021 did show elevated total as well as LDL cholesterol  This was prior to her beginning her weight loss and will recheck  Patient Care Team:  Matthew Otto as PCP - General (Nurse Practitioner)     Review of Systems:     Review of Systems   Constitutional: Negative for chills, fatigue and fever  Respiratory: Negative for cough and shortness of breath  Cardiovascular: Negative for palpitations and leg swelling  Psychiatric/Behavioral: Negative for dysphoric mood  The patient is not nervous/anxious           Problem List:     Patient Active Problem List   Diagnosis   • Hypothyroidism   • Depression   • Primary osteoarthritis of left knee   • Encounter for screening mammogram for malignant neoplasm of breast    • BMI 26 0-26 9,adult   • Bronchitis   • Vitamin D deficiency   • Elevated cholesterol   • Medicare annual wellness visit, subsequent   • GERD (gastroesophageal reflux disease)      Past Medical and Surgical History:     Past Medical History:   Diagnosis Date   • Bipolar II disorder ([facility] [address] ) 2015   • Class 3 severe obesity due to excess calories without serious comorbidity with body mass index (BMI) of 40 0 to 44 9 in adult [facility]) 2020   • Disease of thyroid gland    • GERD (gastroesophageal reflux disease)    • Primary osteoarthritis of left knee 2020     Past Surgical History:   Procedure Laterality Date   •  SECTION  2004    PREGNANCY AT TERM      Family History:     Family History   Problem Relation Age of Onset   • Thyroid disease Mother         DISORDER   • Lung cancer Father    • Diabetes Father         MELLITUS   • Hypertension Father    • No Known Problems Daughter    • No Known Problems Maternal Grandmother    • No Known Problems Maternal Grandfather    • No Known Problems Paternal Grandmother    • No Known Problems Paternal Grandfather    • No Known Problems Maternal Aunt    • No Known Problems Maternal Aunt       Social History:     Social History     Socioeconomic History   • Marital status: Single     Spouse name: None   • Number of children: 1   • Years of education: None   • Highest education level: None   Occupational History   • None   Tobacco Use   • Smoking status: Never   • Smokeless tobacco: Never   • Tobacco comments:     NO TOBACCO USE   Vaping Use   • Vaping Use: Never used   Substance and Sexual Activity   • Alcohol use: No   • Drug use: No   • Sexual activity: Never Birth control/protection: Other     Comment: Amneal   Other Topics Concern   • None   Social History Narrative   • None     Social Determinants of Health     Financial Resource Strain: Low Risk    • Difficulty of Paying Living Expenses: Not hard at all   Food Insecurity: Not on file   Transportation Needs: No Transportation Needs   • Lack of Transportation (Medical): No   • Lack of Transportation (Non-Medical): No   Physical Activity: Not on file   Stress: Not on file   Social Connections: Not on file   Intimate Partner Violence: Not on file   Housing Stability: Not on file      Medications and Allergies:     Current Outpatient Medications   Medication Sig Dispense Refill   • calcium carbonate (OS-AUGIE) 600 MG tablet every 24 hours     • cetirizine (ZyrTEC) 10 mg tablet TAKE 1 TABLET BY MOUTH EVERY DAY 90 tablet 1   • Cholecalciferol 5000 units TABS take 1 capsule by oral route  every day start after completing weekly dose     • clotrimazole-betamethasone (LOTRISONE) 1-0 05 % cream Apply topically 2 (two) times a day 30 g 0   • ibuprofen (MOTRIN) 600 mg tablet Take 1 tablet (600 mg total) by mouth every 6 (six) hours as needed for mild pain 30 tablet 0   • levothyroxine 50 mcg tablet Take 1 tablet (50 mcg total) by mouth daily 90 tablet 3   • norethindrone-ethinyl estradiol (Junel FE 1/20) 1-20 MG-MCG per tablet Take 1 tablet by mouth daily For 84 days then placebo for 7 days 91 tablet 1   • omeprazole (PriLOSEC) 40 MG capsule Take 1 capsule (40 mg total) by mouth daily 90 capsule 3   • sucralfate (CARAFATE) 1 g/10 mL suspension Take 10 mL (1 g total) by mouth 4 (four) times a day (with meals and at bedtime) 1200 mL 0     No current facility-administered medications for this visit       Allergies   Allergen Reactions   • No Active Allergies       Immunizations:     Immunization History   Administered Date(s) Administered   • COVID-19 PFIZER VACCINE 0 3 ML IM 04/12/2021, 05/06/2021, 11/26/2021   • Fluzone Split Quad 0 25 mL 10/05/2016   • INFLUENZA 10/05/2016, 09/27/2017, 09/27/2017, 10/31/2022   • Influenza, injectable, quadrivalent, preservative free 0 5 mL 09/21/2018   • Influenza, recombinant, quadrivalent,injectable, preservative free 09/25/2019, 09/14/2020, 10/25/2021   • TD (adult) Preservative Free 02/17/2020   • Tdap 09/22/2009      Health Maintenance:         Topic Date Due   • Colorectal Cancer Screening  Never done   • Breast Cancer Screening: Mammogram  04/01/2023   • Cervical Cancer Screening  11/06/2023   • HIV Screening  Completed   • Hepatitis C Screening  Completed         Topic Date Due   • COVID-19 Vaccine (4 - Booster for Lewis Lj series) 01/21/2022      Medicare Screening Tests and Risk Assessments:     Ava Simpson is here for her Subsequent Wellness visit  Health Risk Assessment:   Patient rates overall health as good  Patient feels that their physical health rating is same  Patient is satisfied with their life  Eyesight was rated as same  Hearing was rated as same  Patient feels that their emotional and mental health rating is same  Patients states they are never, rarely angry  Patient states they are never, rarely unusually tired/fatigued  Pain experienced in the last 7 days has been none  Patient states that she has experienced no weight loss or gain in last 6 months  Depression Screening:   PHQ-9 Score: 0      Fall Risk Screening: In the past year, patient has experienced: no history of falling in past year      Urinary Incontinence Screening:   Patient has not leaked urine accidently in the last six months  Home Safety:  Patient does not have trouble with stairs inside or outside of their home  Patient has working smoke alarms and has working carbon monoxide detector  Home safety hazards include: none  Nutrition:   Current diet is Regular  Medications:   Patient is currently taking over-the-counter supplements   OTC medications include: see medication list  Patient is able to manage medications  Activities of Daily Living (ADLs)/Instrumental Activities of Daily Living (IADLs):   Walk and transfer into and out of bed and chair?: Yes  Dress and groom yourself?: Yes    Bathe or shower yourself?: Yes    Feed yourself? Yes  Do your laundry/housekeeping?: Yes  Manage your money, pay your bills and track your expenses?: Yes  Make your own meals?: Yes    Do your own shopping?: Yes    Previous Hospitalizations:   Any hospitalizations or ED visits within the last 12 months?: No      Advance Care Planning:   Living will: Yes    Durable POA for healthcare: Yes    Advanced directive: Yes    Advanced directive counseling given: Yes      Cognitive Screening:   Provider or family/friend/caregiver concerned regarding cognition?: No    PREVENTIVE SCREENINGS      Cardiovascular Screening:    General: Screening Current      Colorectal Cancer Screening:     General: Risks and Benefits Discussed    Due for: Colonoscopy - Low Risk      Breast Cancer Screening:     General: Screening Current      Cervical Cancer Screening:    General: Screening Current      Osteoporosis Screening:    General: Screening Not Indicated      Abdominal Aortic Aneurysm (AAA) Screening:        General: Screening Not Indicated      Lung Cancer Screening:     General: Screening Not Indicated      Hepatitis C Screening:    General: Screening Current    Screening, Brief Intervention, and Referral to Treatment (SBIRT)    Screening  Typical number of drinks in a day: 0  Typical number of drinks in a week: 0  Interpretation: Low risk drinking behavior  Single Item Drug Screening:  How often have you used an illegal drug (including marijuana) or a prescription medication for non-medical reasons in the past year? never    Single Item Drug Screen Score: 0  Interpretation: Negative screen for possible drug use disorder    No results found       Physical Exam:     /70 (BP Location: Left arm, Patient Position: Sitting, Cuff Size: Large)   Pulse 73   Temp 97 8 °F (36 6 °C) (Temporal)   Ht 5' 4" (1 626 m)   Wt 70 9 kg (156 lb 3 2 oz)   SpO2 98%   BMI 26 81 kg/m²     Physical Exam  Vitals reviewed  Constitutional:       Appearance: Normal appearance  She is overweight  Neck:      Thyroid: No thyromegaly  Cardiovascular:      Rate and Rhythm: Normal rate and regular rhythm  Pulses: Normal pulses  Heart sounds: Normal heart sounds  Pulmonary:      Effort: Pulmonary effort is normal       Breath sounds: Normal breath sounds  Musculoskeletal:      Cervical back: Normal range of motion and neck supple  Lymphadenopathy:      Cervical: No cervical adenopathy  Skin:     General: Skin is warm and dry  Capillary Refill: Capillary refill takes less than 2 seconds  Neurological:      Mental Status: She is alert and oriented to person, place, and time     Psychiatric:         Mood and Affect: Mood normal          Behavior: Behavior normal           MIKI Talbot

## 2022-12-28 LAB
LAB AP GYN PRIMARY INTERPRETATION: NORMAL
Lab: NORMAL

## 2023-02-20 PROBLEM — Z00.00 MEDICARE ANNUAL WELLNESS VISIT, SUBSEQUENT: Status: RESOLVED | Noted: 2022-12-22 | Resolved: 2023-02-20

## 2023-03-24 ENCOUNTER — TELEPHONE (OUTPATIENT)
Dept: FAMILY MEDICINE CLINIC | Facility: CLINIC | Age: 48
End: 2023-03-24

## 2023-04-05 NOTE — PROGRESS NOTES
Assessment/Plan:         Diagnoses and all orders for this visit:    Rash  Triamcinolone topically for 1 week  Call if not improving      Dry skin  -     triamcinolone (KENALOG) 0 1 % ointment; Apply topically 2 (two) times a day    Bipolar II disorder (HCC)    Class 3 severe obesity due to excess calories without serious comorbidity with body mass index (BMI) of 40 0 to 44 9 in adult [facility])          Subjective:      Patient ID: Camilla Calero is a 38 y o  female  HPI    Rash on left forearm   Itches occasionally    Been there 2 weeks  Wont clear  No where else on body  No cause and not leaning on anything  No pus   NO pimple area  The following portions of the patient's history were reviewed and updated as appropriate: allergies, current medications, past family history, past medical history, past social history, past surgical history and problem list     Review of Systems   Constitutional: Negative for activity change, appetite change, chills and unexpected weight change  HENT: Negative for sinus pressure and sinus pain  Respiratory: Negative for cough  Cardiovascular: Negative for chest pain  Gastrointestinal: Negative for abdominal pain  Musculoskeletal: Negative for back pain  Skin: Positive for rash  Neurological: Negative for dizziness  Objective:  Vitals:    01/02/20 1146   BP: 122/74   Pulse: 67   Temp: 98 2 °F (36 8 °C)   TempSrc: Temporal   SpO2: 98%   Weight: 115 kg (254 lb 6 4 oz)   Height: 5' 4" (1 626 m)      Physical Exam   Constitutional: She appears well-developed and well-nourished  Skin:        Vitals reviewed 
Her/She

## 2023-04-14 DIAGNOSIS — Z12.31 ENCOUNTER FOR SCREENING MAMMOGRAM FOR MALIGNANT NEOPLASM OF BREAST: Primary | ICD-10-CM

## 2023-05-01 DIAGNOSIS — Z30.019 ENCOUNTER FOR FEMALE BIRTH CONTROL: ICD-10-CM

## 2023-05-01 RX ORDER — NORETHINDRONE ACETATE AND ETHINYL ESTRADIOL AND FERROUS FUMARATE 1MG-20(21)
KIT ORAL
Qty: 112 TABLET | Refills: 1 | Status: SHIPPED | OUTPATIENT
Start: 2023-05-01

## 2023-05-04 ENCOUNTER — OFFICE VISIT (OUTPATIENT)
Dept: FAMILY MEDICINE CLINIC | Facility: CLINIC | Age: 48
End: 2023-05-04

## 2023-05-04 VITALS
WEIGHT: 157.4 LBS | HEART RATE: 57 BPM | OXYGEN SATURATION: 100 % | BODY MASS INDEX: 27.02 KG/M2 | DIASTOLIC BLOOD PRESSURE: 80 MMHG | TEMPERATURE: 98.6 F | SYSTOLIC BLOOD PRESSURE: 130 MMHG

## 2023-05-04 DIAGNOSIS — N63.0 BREAST MASS IN FEMALE: Primary | ICD-10-CM

## 2023-05-04 NOTE — PROGRESS NOTES
Subjective:   Chief Complaint   Patient presents with    Breast Problem     Lump on breast        Patient ID: Renard Catherine is a 56 y o  female  Presents today for concern over left breast lump  She noticed it 3-4 days ago when she was doing a breast exam  She has a family history with a maternal aunt who had breast CA  The following portions of the patient's history were reviewed and updated as appropriate: allergies, current medications, past family history, past medical history, past social history, past surgical history and problem list     Review of Systems   Constitutional: Negative for chills, fatigue and fever  Respiratory: Negative for cough and shortness of breath  Cardiovascular: Negative for chest pain (left breast lump), palpitations and leg swelling  Psychiatric/Behavioral: Negative for dysphoric mood  The patient is not nervous/anxious  Objective:  Vitals:    05/04/23 1445   BP: 130/80   BP Location: Left arm   Patient Position: Sitting   Cuff Size: Adult   Pulse: 57   Temp: 98 6 °F (37 °C)   TempSrc: Temporal   SpO2: 100%   Weight: 71 4 kg (157 lb 6 4 oz)      Physical Exam  Vitals reviewed  Exam conducted with a chaperone present  Constitutional:       Appearance: Normal appearance  Cardiovascular:      Rate and Rhythm: Normal rate and regular rhythm  Pulses: Normal pulses  Heart sounds: Normal heart sounds  Pulmonary:      Effort: Pulmonary effort is normal       Breath sounds: Normal breath sounds  Chest:   Breasts: Mo Score is 5  Right: Normal       Left: Swelling and mass present  Skin:     General: Skin is warm and dry  Capillary Refill: Capillary refill takes less than 2 seconds  Neurological:      Mental Status: She is alert and oriented to person, place, and time     Psychiatric:         Mood and Affect: Mood normal          Behavior: Behavior normal            Assessment/Plan:    No problem-specific Assessment & Plan notes found for this encounter  Diagnoses and all orders for this visit:    Breast mass in female  -     Mammo diagnostic left w cad; Future  -     US breast left limited (diagnostic);  Future

## 2023-06-22 DIAGNOSIS — N63.0 BREAST MASS IN FEMALE: Primary | ICD-10-CM

## 2023-06-23 ENCOUNTER — DOCUMENTATION (OUTPATIENT)
Dept: HEMATOLOGY ONCOLOGY | Facility: CLINIC | Age: 48
End: 2023-06-23

## 2023-06-23 NOTE — PROGRESS NOTES
Intake received- chart reviewed for external records retrieval     Due to Dx on patient's referral, no records retrieval needed by Oncology Care Coordination

## 2023-07-10 ENCOUNTER — HOSPITAL ENCOUNTER (OUTPATIENT)
Dept: MAMMOGRAPHY | Facility: CLINIC | Age: 48
Discharge: HOME | End: 2023-07-10

## 2023-07-10 DIAGNOSIS — Z76.89 REFERRAL OF PATIENT WITHOUT EXAMINATION OR TREATMENT: ICD-10-CM

## 2023-07-26 ENCOUNTER — OFFICE VISIT (OUTPATIENT)
Dept: FAMILY MEDICINE CLINIC | Facility: CLINIC | Age: 48
End: 2023-07-26
Payer: COMMERCIAL

## 2023-07-26 VITALS
SYSTOLIC BLOOD PRESSURE: 128 MMHG | BODY MASS INDEX: 26.26 KG/M2 | TEMPERATURE: 98.8 F | OXYGEN SATURATION: 99 % | HEIGHT: 64 IN | DIASTOLIC BLOOD PRESSURE: 80 MMHG | HEART RATE: 78 BPM | WEIGHT: 153.8 LBS

## 2023-07-26 DIAGNOSIS — R21 RASH: Primary | ICD-10-CM

## 2023-07-26 PROCEDURE — 99214 OFFICE O/P EST MOD 30 MIN: CPT | Performed by: NURSE PRACTITIONER

## 2023-07-26 RX ORDER — MELOXICAM 15 MG/1
15 TABLET ORAL DAILY
COMMUNITY
Start: 2023-06-28 | End: 2024-06-27

## 2023-07-26 RX ORDER — METHOCARBAMOL 750 MG/1
750 TABLET, FILM COATED ORAL EVERY 6 HOURS PRN
COMMUNITY
Start: 2023-03-24

## 2023-07-26 NOTE — PROGRESS NOTES
Subjective:   Chief Complaint   Patient presents with   • Rash     Left breast , x 5 days         Patient ID: Rome Serrano is a 56 y.o. female. Presents today for concern over rash of left breast for the past 5 days. No changes to soaps, detergents, laundry detergent or sunscreens. She has not tried anything over-the-counter      The following portions of the patient's history were reviewed and updated as appropriate: allergies, current medications, past family history, past medical history, past social history, past surgical history and problem list.    Review of Systems   Constitutional: Negative for chills, fatigue and fever. Respiratory: Negative for cough and shortness of breath. Cardiovascular: Negative for palpitations and leg swelling. Skin: Positive for rash (left breast). Psychiatric/Behavioral: Negative for dysphoric mood. The patient is not nervous/anxious. Objective:  Vitals:    07/26/23 1313   BP: 128/80   BP Location: Left arm   Patient Position: Sitting   Cuff Size: Adult   Pulse: 78   Temp: 98.8 °F (37.1 °C)   TempSrc: Temporal   SpO2: 99%   Weight: 69.8 kg (153 lb 12.8 oz)   Height: 5' 4" (1.626 m)      Physical Exam  Vitals reviewed. Constitutional:       Appearance: Normal appearance. She is overweight. Cardiovascular:      Rate and Rhythm: Normal rate and regular rhythm. Pulses: Normal pulses. Heart sounds: Normal heart sounds. Pulmonary:      Effort: Pulmonary effort is normal.      Breath sounds: Normal breath sounds. Skin:     General: Skin is warm and dry. Capillary Refill: Capillary refill takes less than 2 seconds. Findings: Rash present. Rash is papular. Neurological:      Mental Status: She is alert and oriented to person, place, and time.    Psychiatric:         Mood and Affect: Mood normal.         Behavior: Behavior normal.           Assessment/Plan:    No problem-specific Assessment & Plan notes found for this encounter. Diagnoses and all orders for this visit:    Rash  -     hydrocortisone 2.5 % ointment; Apply topically 2 (two) times a day    Other orders  -     methocarbamol (ROBAXIN) 750 mg tablet; Take 750 mg by mouth every 6 (six) hours as needed  -     meloxicam (MOBIC) 15 mg tablet;  Take 15 mg by mouth daily

## 2023-08-21 DIAGNOSIS — K21.9 GASTROESOPHAGEAL REFLUX DISEASE WITHOUT ESOPHAGITIS: ICD-10-CM

## 2023-08-21 DIAGNOSIS — Z30.019 ENCOUNTER FOR FEMALE BIRTH CONTROL: ICD-10-CM

## 2023-08-21 DIAGNOSIS — R21 RASH: ICD-10-CM

## 2023-08-21 RX ORDER — OMEPRAZOLE 40 MG/1
40 CAPSULE, DELAYED RELEASE ORAL DAILY
Qty: 90 CAPSULE | Refills: 3 | Status: SHIPPED | OUTPATIENT
Start: 2023-08-21

## 2023-08-21 RX ORDER — CLOTRIMAZOLE AND BETAMETHASONE DIPROPIONATE 10; .64 MG/G; MG/G
CREAM TOPICAL 2 TIMES DAILY
Qty: 30 G | Refills: 0 | Status: SHIPPED | OUTPATIENT
Start: 2023-08-21

## 2023-08-21 RX ORDER — NORETHINDRONE ACETATE AND ETHINYL ESTRADIOL 1MG-20(21)
1 KIT ORAL DAILY
Qty: 112 TABLET | Refills: 1 | Status: SHIPPED | OUTPATIENT
Start: 2023-08-21

## 2023-10-02 DIAGNOSIS — Z30.019 ENCOUNTER FOR FEMALE BIRTH CONTROL: ICD-10-CM

## 2023-10-02 DIAGNOSIS — K21.9 GASTROESOPHAGEAL REFLUX DISEASE WITHOUT ESOPHAGITIS: ICD-10-CM

## 2023-10-02 RX ORDER — OMEPRAZOLE 40 MG/1
40 CAPSULE, DELAYED RELEASE ORAL DAILY
Qty: 90 CAPSULE | Refills: 3 | Status: SHIPPED | OUTPATIENT
Start: 2023-10-02

## 2023-10-02 RX ORDER — NORETHINDRONE ACETATE AND ETHINYL ESTRADIOL 1MG-20(21)
1 KIT ORAL DAILY
Qty: 112 TABLET | Refills: 1 | Status: SHIPPED | OUTPATIENT
Start: 2023-10-02

## 2023-10-17 ENCOUNTER — OFFICE VISIT (OUTPATIENT)
Dept: FAMILY MEDICINE CLINIC | Facility: CLINIC | Age: 48
End: 2023-10-17

## 2023-10-17 VITALS
TEMPERATURE: 98.6 F | BODY MASS INDEX: 27.28 KG/M2 | OXYGEN SATURATION: 99 % | SYSTOLIC BLOOD PRESSURE: 112 MMHG | HEART RATE: 68 BPM | WEIGHT: 159.8 LBS | DIASTOLIC BLOOD PRESSURE: 62 MMHG | HEIGHT: 64 IN

## 2023-10-17 DIAGNOSIS — M21.612 BUNION OF LEFT FOOT: Primary | ICD-10-CM

## 2023-10-17 NOTE — PROGRESS NOTES
Name: Teresa Porter      : 1975      MRN: [id number]  Encounter Provider: MIKI Quick  Encounter Date: 10/17/2023   Encounter department: [facility]     1. Bunion of left foot  -     Ambulatory Referral to Podiatry; Future           Subjective      Here for referral to podiatry related to worsening pain of left foot bunion. Leg Pain   The incident occurred more than 1 week ago. There was no injury mechanism. The pain is present in the left foot. The quality of the pain is described as shooting. The pain is at a severity of 6/10. The pain is moderate. The pain has been Worsening since onset. Associated symptoms comments: Decreased sensation, pins and needles  . She reports no foreign bodies present. The symptoms are aggravated by movement and weight bearing. She has tried ice, heat, non-weight bearing and rest for the symptoms. The treatment provided no relief. Review of Systems   Constitutional:  Negative for activity change, chills, fatigue and fever. HENT:  Negative for congestion, ear pain, rhinorrhea, sore throat and trouble swallowing. Eyes:  Negative for pain and visual disturbance. Respiratory:  Negative for cough, chest tightness and shortness of breath. Cardiovascular:  Negative for chest pain, palpitations and leg swelling. Gastrointestinal:  Negative for abdominal pain, constipation, diarrhea, nausea and vomiting. Genitourinary:  Negative for difficulty urinating, dysuria, hematuria and urgency. Musculoskeletal:  Negative for arthralgias and back pain. Skin:  Negative for color change and rash. Neurological:  Negative for dizziness, seizures, syncope and headaches. Psychiatric/Behavioral:  Negative for dysphoric mood. The patient is not nervous/anxious. All other systems reviewed and are negative.       Current Outpatient Medications on File Prior to Visit   Medication Sig    calcium carbonate (OS-AUGIE) 600 MG tablet every 24 hours    cetirizine (ZyrTEC) 10 mg tablet TAKE 1 TABLET BY MOUTH EVERY DAY    Cholecalciferol 5000 units TABS take 1 capsule by oral route  every day start after completing weekly dose    clotrimazole-betamethasone (LOTRISONE) 1-0.05 % cream Apply topically 2 (two) times a day    hydrocortisone 2.5 % ointment Apply topically 2 (two) times a day    ibuprofen (MOTRIN) 600 mg tablet Take 1 tablet (600 mg total) by mouth every 6 (six) hours as needed for mild pain    levothyroxine 50 mcg tablet Take 1 tablet (50 mcg total) by mouth daily    meloxicam (MOBIC) 15 mg tablet Take 15 mg by mouth daily    methocarbamol (ROBAXIN) 750 mg tablet Take 750 mg by mouth every 6 (six) hours as needed    norethindrone-ethinyl estradiol (Junel FE 1/20) 1-20 MG-MCG per tablet Take 1 tablet by mouth daily Take 1 active tablet for 84 days then the inactive for 7 days    omeprazole (PriLOSEC) 40 MG capsule Take 1 capsule (40 mg total) by mouth daily    sucralfate (CARAFATE) 1 g/10 mL suspension Take 10 mL (1 g total) by mouth 4 (four) times a day (with meals and at bedtime)       Objective     /62 (BP Location: Left arm, Patient Position: Sitting, Cuff Size: Adult)   Pulse 68   Temp 98.6 °F (37 °C) (Temporal)   Ht 5' 4" (1.626 m)   Wt 72.5 kg (159 lb 12.8 oz)   SpO2 99%   BMI 27.43 kg/m²     Physical Exam  Musculoskeletal:      Left foot: Bunion present.         Feet:    Feet:      Comments: Bunion present      MIKI Cortez

## 2023-10-25 DIAGNOSIS — R63.8 CONCERN ABOUT FOOD OR NUTRITION: Primary | ICD-10-CM

## 2023-11-24 DIAGNOSIS — K21.9 GASTROESOPHAGEAL REFLUX DISEASE WITHOUT ESOPHAGITIS: ICD-10-CM

## 2023-11-24 DIAGNOSIS — E03.9 HYPOTHYROIDISM, UNSPECIFIED TYPE: ICD-10-CM

## 2023-11-24 DIAGNOSIS — Z30.019 ENCOUNTER FOR FEMALE BIRTH CONTROL: ICD-10-CM

## 2023-11-24 RX ORDER — NORETHINDRONE ACETATE AND ETHINYL ESTRADIOL 1MG-20(21)
1 KIT ORAL DAILY
Qty: 112 TABLET | Refills: 1 | Status: SHIPPED | OUTPATIENT
Start: 2023-11-24

## 2023-11-24 RX ORDER — LEVOTHYROXINE SODIUM 0.05 MG/1
50 TABLET ORAL DAILY
Qty: 90 TABLET | Refills: 3 | Status: SHIPPED | OUTPATIENT
Start: 2023-11-24

## 2023-11-24 RX ORDER — OMEPRAZOLE 40 MG/1
40 CAPSULE, DELAYED RELEASE ORAL DAILY
Qty: 90 CAPSULE | Refills: 3 | Status: SHIPPED | OUTPATIENT
Start: 2023-11-24

## 2023-11-28 ENCOUNTER — OFFICE VISIT (OUTPATIENT)
Dept: FAMILY MEDICINE CLINIC | Facility: CLINIC | Age: 48
End: 2023-11-28
Payer: COMMERCIAL

## 2023-11-28 VITALS
DIASTOLIC BLOOD PRESSURE: 80 MMHG | WEIGHT: 159 LBS | HEIGHT: 64 IN | HEART RATE: 79 BPM | BODY MASS INDEX: 27.14 KG/M2 | OXYGEN SATURATION: 96 % | TEMPERATURE: 98.2 F | SYSTOLIC BLOOD PRESSURE: 118 MMHG

## 2023-11-28 DIAGNOSIS — J01.11 ACUTE RECURRENT FRONTAL SINUSITIS: ICD-10-CM

## 2023-11-28 DIAGNOSIS — G43.809 OTHER MIGRAINE WITHOUT STATUS MIGRAINOSUS, NOT INTRACTABLE: Primary | ICD-10-CM

## 2023-11-28 PROCEDURE — 99213 OFFICE O/P EST LOW 20 MIN: CPT

## 2023-11-28 RX ORDER — AZITHROMYCIN 250 MG/1
TABLET, FILM COATED ORAL
Qty: 6 TABLET | Refills: 0 | Status: SHIPPED | OUTPATIENT
Start: 2023-11-28 | End: 2023-11-28 | Stop reason: CLARIF

## 2023-11-28 RX ORDER — SUMATRIPTAN 50 MG/1
50 TABLET, FILM COATED ORAL ONCE AS NEEDED
Qty: 9 TABLET | Refills: 0 | Status: SHIPPED | OUTPATIENT
Start: 2023-11-28 | End: 2023-11-29 | Stop reason: SDUPTHER

## 2023-11-28 RX ORDER — AZITHROMYCIN 250 MG/1
TABLET, FILM COATED ORAL
Qty: 6 TABLET | Refills: 0 | Status: SHIPPED | OUTPATIENT
Start: 2023-11-28 | End: 2023-11-28 | Stop reason: SDUPTHER

## 2023-11-28 RX ORDER — AZITHROMYCIN 250 MG/1
TABLET, FILM COATED ORAL
Qty: 6 TABLET | Refills: 0 | Status: SHIPPED | OUTPATIENT
Start: 2023-11-28 | End: 2023-12-03

## 2023-11-28 RX ORDER — LORATADINE 10 MG/1
10 TABLET ORAL DAILY
Qty: 30 TABLET | Refills: 0 | Status: SHIPPED | OUTPATIENT
Start: 2023-11-28 | End: 2023-11-29

## 2023-11-28 RX ORDER — PREDNISONE 20 MG/1
20 TABLET ORAL DAILY
Qty: 5 TABLET | Refills: 0 | Status: SHIPPED | OUTPATIENT
Start: 2023-11-28 | End: 2023-12-03

## 2023-11-28 NOTE — PROGRESS NOTES
Name: Bryce Sales      : 1975      MRN: [id number]  Encounter Provider: MIKI Ramos  Encounter Date: 2023   Encounter department: [facility]     1. Other migraine without status migrainosus, not intractable  Assessment & Plan:  Headache/migraine treatment:   Abortive medications (for immediate treatment of a headache): Ok to take ibuprofen or acetaminophen for headaches, but try to limit the amount and frequency that you are taking to avoid medication overuse/rebound headache. Ideally no more than 2-3 days per week. Lifestyle Recommendations:  - Maintain good sleep hygiene. Going to bed and waking up at consistent times, avoiding excessive daytime naps, avoiding caffeinated beverages in the evening, avoid excessive stimulation in the evening and generally using bed primarily for sleeping. One hour before bedtime would recommend turning lights down lower, decreasing your activity (may read quietly, listen to music at a low volume). When you get into bed, should eliminate all technology (no texting, emailing, playing with your phone, iPad or tablet in bed). - Maintain good hydration. Drink  2L of fluid a day (4 typical small water bottles)  - Maintain good nutrition. In particular don't skip meals and eat balanced meals regularly. -Maintain proper exercise 150 minutes/weel    Orders:  -     SUMAtriptan (IMITREX) 50 mg tablet; Take 1 tablet (50 mg total) by mouth once as needed for migraine for up to 30 doses    2. Acute recurrent frontal sinusitis  -     predniSONE 20 mg tablet; Take 1 tablet (20 mg total) by mouth daily for 5 days  -     loratadine (CLARITIN) 10 mg tablet; Take 1 tablet (10 mg total) by mouth daily  -     azithromycin (Zithromax) 250 mg tablet; Take 2 tablets (500 mg total) by mouth daily for 1 day, THEN 1 tablet (250 mg total) daily for 4 days. Subjective      Migraine  This is a new problem.  The current episode started 1 to 4 weeks ago. The problem occurs daily. The problem has been gradually worsening since onset. The pain is present in the frontal. The pain does not radiate. The pain quality is not similar to prior headaches. The quality of the pain is described as band-like. The pain is at a severity of 7/10. The pain is moderate. Associated symptoms include coughing, dizziness, sinus pressure, a sore throat, swollen glands and weakness. Pertinent negatives include no abdominal pain, back pain, diarrhea, ear pain, eye pain, fever, insomnia, loss of balance, nausea, numbness, phonophobia, photophobia, rhinorrhea, seizures, tingling, tinnitus or vomiting. The symptoms are aggravated by emotional stress. Past treatments include acetaminophen, Excedrin and NSAIDs. The treatment provided mild relief. Her past medical history is significant for sinus disease. Sore Throat   This is a recurrent problem. The current episode started 1 to 4 weeks ago. The problem has been waxing and waning. There has been no fever. The pain is at a severity of 0/10. Associated symptoms include congestion, coughing and swollen glands. Pertinent negatives include no abdominal pain, diarrhea, ear pain, headaches, shortness of breath, trouble swallowing or vomiting. She has had no exposure to strep or mono. Treatments tried: antibiotics. The treatment provided moderate relief. Review of Systems   Constitutional:  Negative for activity change, chills, fatigue and fever. HENT:  Positive for congestion, sinus pressure and sore throat. Negative for ear pain, mouth sores, postnasal drip, rhinorrhea, sinus pain, tinnitus and trouble swallowing. Eyes:  Negative for photophobia, pain and visual disturbance. Respiratory:  Positive for cough. Negative for chest tightness and shortness of breath. Cardiovascular:  Negative for chest pain, palpitations and leg swelling.    Gastrointestinal:  Negative for abdominal pain, constipation, diarrhea, nausea and vomiting. Genitourinary:  Negative for difficulty urinating, dysuria, hematuria and urgency. Musculoskeletal:  Negative for arthralgias and back pain. Skin:  Negative for color change and rash. Neurological:  Positive for dizziness and weakness. Negative for tingling, seizures, syncope, numbness, headaches and loss of balance. Psychiatric/Behavioral:  Negative for dysphoric mood. The patient is not nervous/anxious and does not have insomnia. All other systems reviewed and are negative.       Current Outpatient Medications on File Prior to Visit   Medication Sig    calcium carbonate (OS-AUGIE) 600 MG tablet every 24 hours    cetirizine (ZyrTEC) 10 mg tablet TAKE 1 TABLET BY MOUTH EVERY DAY    Cholecalciferol 5000 units TABS take 1 capsule by oral route  every day start after completing weekly dose    clotrimazole-betamethasone (LOTRISONE) 1-0.05 % cream Apply topically 2 (two) times a day    hydrocortisone 2.5 % ointment Apply topically 2 (two) times a day    ibuprofen (MOTRIN) 600 mg tablet Take 1 tablet (600 mg total) by mouth every 6 (six) hours as needed for mild pain    levothyroxine 50 mcg tablet Take 1 tablet (50 mcg total) by mouth daily    meloxicam (MOBIC) 15 mg tablet Take 15 mg by mouth daily    methocarbamol (ROBAXIN) 750 mg tablet Take 750 mg by mouth every 6 (six) hours as needed    norethindrone-ethinyl estradiol (Junel FE 1/20) 1-20 MG-MCG per tablet Take 1 tablet by mouth daily Take 1 active tablet for 84 days then the inactive for 7 days    omeprazole (PriLOSEC) 40 MG capsule Take 1 capsule (40 mg total) by mouth daily    sucralfate (CARAFATE) 1 g/10 mL suspension Take 10 mL (1 g total) by mouth 4 (four) times a day (with meals and at bedtime)       Objective     /80 (BP Location: Left arm, Patient Position: Sitting, Cuff Size: Adult)   Pulse 79   Temp 98.2 °F (36.8 °C) (Temporal)   Ht 5' 4" (1.626 m)   Wt 72.1 kg (159 lb)   SpO2 96%   BMI 27.29 kg/m² Physical Exam  Vitals and nursing note reviewed. Constitutional:       Appearance: Normal appearance. She is normal weight. HENT:      Head: Normocephalic. Right Ear: Tympanic membrane, ear canal and external ear normal. There is no impacted cerumen. Left Ear: Tympanic membrane, ear canal and external ear normal. There is no impacted cerumen. Nose: Nose normal.      Mouth/Throat:      Mouth: Mucous membranes are moist.      Pharynx: Oropharynx is clear. Eyes:      Extraocular Movements: Extraocular movements intact. Conjunctiva/sclera: Conjunctivae normal.      Pupils: Pupils are equal, round, and reactive to light. Cardiovascular:      Rate and Rhythm: Normal rate and regular rhythm. Pulses: Normal pulses. Heart sounds: Normal heart sounds. Pulmonary:      Effort: Pulmonary effort is normal.      Breath sounds: Normal breath sounds. Abdominal:      General: Bowel sounds are normal. There is no distension. Palpations: Abdomen is soft. Tenderness: There is no abdominal tenderness. Musculoskeletal:         General: Normal range of motion. Cervical back: Normal range of motion. Skin:     General: Skin is warm. Capillary Refill: Capillary refill takes less than 2 seconds. Neurological:      General: No focal deficit present. Mental Status: She is alert and oriented to person, place, and time. Mental status is at baseline. Psychiatric:         Mood and Affect: Mood normal.         Behavior: Behavior normal.         Thought Content:  Thought content normal.         Judgment: Judgment normal.       MIKI Hess

## 2023-11-29 ENCOUNTER — APPOINTMENT (OUTPATIENT)
Dept: LAB | Facility: HOSPITAL | Age: 48
End: 2023-11-29
Payer: COMMERCIAL

## 2023-11-29 DIAGNOSIS — E78.00 ELEVATED CHOLESTEROL: ICD-10-CM

## 2023-11-29 DIAGNOSIS — E55.9 VITAMIN D DEFICIENCY: ICD-10-CM

## 2023-11-29 DIAGNOSIS — G43.809 OTHER MIGRAINE WITHOUT STATUS MIGRAINOSUS, NOT INTRACTABLE: ICD-10-CM

## 2023-11-29 DIAGNOSIS — J01.11 ACUTE RECURRENT FRONTAL SINUSITIS: ICD-10-CM

## 2023-11-29 DIAGNOSIS — T78.40XS ALLERGY, SEQUELA: Primary | ICD-10-CM

## 2023-11-29 DIAGNOSIS — Z00.00 MEDICARE ANNUAL WELLNESS VISIT, SUBSEQUENT: ICD-10-CM

## 2023-11-29 DIAGNOSIS — E03.8 OTHER SPECIFIED HYPOTHYROIDISM: ICD-10-CM

## 2023-11-29 PROBLEM — G43.909 MIGRAINE: Status: ACTIVE | Noted: 2023-11-29

## 2023-11-29 LAB
25(OH)D3 SERPL-MCNC: 33.1 NG/ML (ref 30–100)
ALBUMIN SERPL BCP-MCNC: 4.3 G/DL (ref 3.5–5)
ALP SERPL-CCNC: 54 U/L (ref 34–104)
ALT SERPL W P-5'-P-CCNC: 13 U/L (ref 7–52)
ANION GAP SERPL CALCULATED.3IONS-SCNC: 9 MMOL/L
AST SERPL W P-5'-P-CCNC: 16 U/L (ref 13–39)
BASOPHILS # BLD AUTO: 0.07 THOUSANDS/ÂΜL (ref 0–0.1)
BASOPHILS NFR BLD AUTO: 1 % (ref 0–1)
BILIRUB SERPL-MCNC: 0.56 MG/DL (ref 0.2–1)
BUN SERPL-MCNC: 15 MG/DL (ref 5–25)
CALCIUM SERPL-MCNC: 9.4 MG/DL (ref 8.4–10.2)
CHLORIDE SERPL-SCNC: 103 MMOL/L (ref 96–108)
CHOLEST SERPL-MCNC: 163 MG/DL
CO2 SERPL-SCNC: 25 MMOL/L (ref 21–32)
CREAT SERPL-MCNC: 0.84 MG/DL (ref 0.6–1.3)
EOSINOPHIL # BLD AUTO: 0.15 THOUSAND/ÂΜL (ref 0–0.61)
EOSINOPHIL NFR BLD AUTO: 2 % (ref 0–6)
ERYTHROCYTE [DISTWIDTH] IN BLOOD BY AUTOMATED COUNT: 12.6 % (ref 11.6–15.1)
GFR SERPL CREATININE-BSD FRML MDRD: 83 ML/MIN/1.73SQ M
GLUCOSE P FAST SERPL-MCNC: 91 MG/DL (ref 65–99)
HCT VFR BLD AUTO: 41.7 % (ref 34.8–46.1)
HDLC SERPL-MCNC: 51 MG/DL
HGB BLD-MCNC: 13.7 G/DL (ref 11.5–15.4)
IMM GRANULOCYTES # BLD AUTO: 0.02 THOUSAND/UL (ref 0–0.2)
IMM GRANULOCYTES NFR BLD AUTO: 0 % (ref 0–2)
LDLC SERPL CALC-MCNC: 91 MG/DL (ref 0–100)
LYMPHOCYTES # BLD AUTO: 1.95 THOUSANDS/ÂΜL (ref 0.6–4.47)
LYMPHOCYTES NFR BLD AUTO: 25 % (ref 14–44)
MCH RBC QN AUTO: 27.8 PG (ref 26.8–34.3)
MCHC RBC AUTO-ENTMCNC: 32.9 G/DL (ref 31.4–37.4)
MCV RBC AUTO: 85 FL (ref 82–98)
MONOCYTES # BLD AUTO: 0.54 THOUSAND/ÂΜL (ref 0.17–1.22)
MONOCYTES NFR BLD AUTO: 7 % (ref 4–12)
NEUTROPHILS # BLD AUTO: 5.14 THOUSANDS/ÂΜL (ref 1.85–7.62)
NEUTS SEG NFR BLD AUTO: 65 % (ref 43–75)
NONHDLC SERPL-MCNC: 112 MG/DL
NRBC BLD AUTO-RTO: 0 /100 WBCS
PLATELET # BLD AUTO: 266 THOUSANDS/UL (ref 149–390)
PMV BLD AUTO: 11.4 FL (ref 8.9–12.7)
POTASSIUM SERPL-SCNC: 4.1 MMOL/L (ref 3.5–5.3)
PROT SERPL-MCNC: 7.1 G/DL (ref 6.4–8.4)
RBC # BLD AUTO: 4.92 MILLION/UL (ref 3.81–5.12)
SODIUM SERPL-SCNC: 137 MMOL/L (ref 135–147)
TRIGL SERPL-MCNC: 105 MG/DL
TSH SERPL DL<=0.05 MIU/L-ACNC: 2.39 UIU/ML (ref 0.45–4.5)
WBC # BLD AUTO: 7.87 THOUSAND/UL (ref 4.31–10.16)

## 2023-11-29 PROCEDURE — 85025 COMPLETE CBC W/AUTO DIFF WBC: CPT

## 2023-11-29 PROCEDURE — 80061 LIPID PANEL: CPT

## 2023-11-29 PROCEDURE — 82306 VITAMIN D 25 HYDROXY: CPT

## 2023-11-29 PROCEDURE — 84443 ASSAY THYROID STIM HORMONE: CPT

## 2023-11-29 PROCEDURE — 36415 COLL VENOUS BLD VENIPUNCTURE: CPT

## 2023-11-29 PROCEDURE — 80053 COMPREHEN METABOLIC PANEL: CPT

## 2023-11-29 RX ORDER — SUMATRIPTAN 50 MG/1
50 TABLET, FILM COATED ORAL ONCE AS NEEDED
Qty: 30 TABLET | Refills: 0 | Status: SHIPPED | OUTPATIENT
Start: 2023-11-29

## 2023-11-29 RX ORDER — LORATADINE 10 MG/1
10 TABLET ORAL DAILY
Qty: 90 TABLET | Refills: 1 | Status: SHIPPED | OUTPATIENT
Start: 2023-11-29

## 2023-11-29 RX ORDER — FEXOFENADINE HCL 60 MG/1
60 TABLET, FILM COATED ORAL DAILY
Qty: 90 TABLET | Refills: 0 | Status: SHIPPED | OUTPATIENT
Start: 2023-11-29

## 2023-11-29 NOTE — ASSESSMENT & PLAN NOTE
Headache/migraine treatment:   Abortive medications (for immediate treatment of a headache): Ok to take ibuprofen or acetaminophen for headaches, but try to limit the amount and frequency that you are taking to avoid medication overuse/rebound headache. Ideally no more than 2-3 days per week. Lifestyle Recommendations:  - Maintain good sleep hygiene. Going to bed and waking up at consistent times, avoiding excessive daytime naps, avoiding caffeinated beverages in the evening, avoid excessive stimulation in the evening and generally using bed primarily for sleeping. One hour before bedtime would recommend turning lights down lower, decreasing your activity (may read quietly, listen to music at a low volume). When you get into bed, should eliminate all technology (no texting, emailing, playing with your phone, iPad or tablet in bed). - Maintain good hydration. Drink  2L of fluid a day (4 typical small water bottles)  - Maintain good nutrition. In particular don't skip meals and eat balanced meals regularly.   -Maintain proper exercise 150 minutes/weel

## 2023-11-29 NOTE — TELEPHONE ENCOUNTER
Patient called and said they gave her 9 pills in the bottle for SUMAtriptan instead of 30 day and the Pharmacy explained she has to get a refill saying to have 30 pills and also she wants fexofenadine 180mg tab send over to pharmacy on file

## 2023-12-01 ENCOUNTER — TELEPHONE (OUTPATIENT)
Dept: FAMILY MEDICINE CLINIC | Facility: CLINIC | Age: 48
End: 2023-12-01

## 2023-12-07 ENCOUNTER — CONSULT (OUTPATIENT)
Dept: FAMILY MEDICINE CLINIC | Facility: CLINIC | Age: 48
End: 2023-12-07
Payer: COMMERCIAL

## 2023-12-07 VITALS
DIASTOLIC BLOOD PRESSURE: 76 MMHG | BODY MASS INDEX: 26.4 KG/M2 | SYSTOLIC BLOOD PRESSURE: 134 MMHG | HEIGHT: 64 IN | HEART RATE: 79 BPM | TEMPERATURE: 97.9 F | OXYGEN SATURATION: 96 % | WEIGHT: 154.6 LBS

## 2023-12-07 DIAGNOSIS — M21.619 BUNION OF GREAT TOE: ICD-10-CM

## 2023-12-07 DIAGNOSIS — Z01.818 PREOP EXAMINATION: Primary | ICD-10-CM

## 2023-12-07 PROCEDURE — 99214 OFFICE O/P EST MOD 30 MIN: CPT

## 2023-12-07 NOTE — PROGRESS NOTES
[facility] PRE-OPERATIVE EVALUATION  [facility]YS[facility]IAN [facility] - [facility]    NAME: Patra Bernheim  AGE: 53 y.o. SEX: female  : 1975     DATE: 2023    [facility] Pre-Operative Evaluation      Chief Complaint: Pre-operative Evaluation     Surgery: left bunionectomy   Anticipated Date of Surgery: 23  Referring Provider: Suzanne Sauer DPM       History of Present Illness:     Patra Bernheim is a 53 y.o. female who presents to the office today for a preoperative consultation at the request of surgeon, [facility]- Suzanne Sauer, who plans on performing left bunionectomy  on 23. Planned anesthesia is general. Patient has a bleeding risk of: no recent abnormal bleeding. Patient does not have objections to receiving blood products if needed. Current anti-platelet/anti-coagulation medications that the patient is prescribed includes:  none . Assessment of Chronic Conditions:   - none     Assessment of Cardiac Risk:  Denies unstable or severe angina or MI in the last 6 weeks or history of stent placement in the last year   Denies decompensated heart failure (e.g. New onset heart failure, NYHA functional class IV heart failure, or worsening existing heart failure)  Denies significant arrhythmias such as high grade AV block, symptomatic ventricular arrhythmia, newly recognized ventricular tachycardia, supraventricular tachycardia with resting heart rate >100, or symptomatic bradycardia  Denies severe heart valve disease including aortic stenosis or symptomatic mitral stenosis     Exercise Capacity:  Able to walk 4 blocks without symptoms?: Yes  Able to walk 2 flights without symptoms?: Yes    Prior Anesthesia Reactions: No     Personal history of venous thromboembolic disease? No    History of steroid use for >2 weeks within last year?  No         Review of Systems:     Review of Systems   Constitutional:  Negative for activity change, chills, fatigue and fever. HENT:  Negative for congestion, ear pain, rhinorrhea, sore throat and trouble swallowing. Eyes:  Negative for pain and visual disturbance. Respiratory:  Negative for cough, chest tightness and shortness of breath. Cardiovascular:  Negative for chest pain, palpitations and leg swelling. Gastrointestinal:  Negative for abdominal pain, constipation, diarrhea, nausea and vomiting. Genitourinary:  Negative for difficulty urinating, dysuria, hematuria and urgency. Musculoskeletal:  Positive for gait problem. Negative for arthralgias and back pain. Skin:  Negative for color change and rash. Neurological:  Negative for dizziness, seizures, syncope and headaches. Psychiatric/Behavioral:  Negative for dysphoric mood. The patient is not nervous/anxious. All other systems reviewed and are negative. Current Problem List:     Patient Active Problem List   Diagnosis    Hypothyroidism    Depression    Primary osteoarthritis of left knee    Encounter for screening mammogram for malignant neoplasm of breast     BMI 26.0-26.9,adult    Bronchitis    Vitamin D deficiency    Elevated cholesterol    GERD (gastroesophageal reflux disease)    Migraine       Allergies:      Allergies   Allergen Reactions    No Active Allergies        Current Medications:       Current Outpatient Medications:     calcium carbonate (OS-AUGIE) 600 MG tablet, every 24 hours, Disp: , Rfl:     cetirizine (ZyrTEC) 10 mg tablet, TAKE 1 TABLET BY MOUTH EVERY DAY, Disp: 90 tablet, Rfl: 1    Cholecalciferol 5000 units TABS, take 1 capsule by oral route  every day start after completing weekly dose, Disp: , Rfl:     clotrimazole-betamethasone (LOTRISONE) 1-0.05 % cream, Apply topically 2 (two) times a day, Disp: 30 g, Rfl: 0    fexofenadine (ALLEGRA) 60 MG tablet, Take 1 tablet (60 mg total) by mouth daily, Disp: 90 tablet, Rfl: 0    hydrocortisone 2.5 % ointment, Apply topically 2 (two) times a day, Disp: 30 g, Rfl: 0 ibuprofen (MOTRIN) 600 mg tablet, Take 1 tablet (600 mg total) by mouth every 6 (six) hours as needed for mild pain, Disp: 30 tablet, Rfl: 0    levothyroxine 50 mcg tablet, Take 1 tablet (50 mcg total) by mouth daily, Disp: 90 tablet, Rfl: 3    loratadine (CLARITIN) 10 mg tablet, TAKE 1 TABLET BY MOUTH EVERY DAY, Disp: 90 tablet, Rfl: 1    meloxicam (MOBIC) 15 mg tablet, Take 15 mg by mouth daily, Disp: , Rfl:     methocarbamol (ROBAXIN) 750 mg tablet, Take 750 mg by mouth every 6 (six) hours as needed, Disp: , Rfl:     norethindrone-ethinyl estradiol (Junel FE 1/20) 1-20 MG-MCG per tablet, Take 1 tablet by mouth daily Take 1 active tablet for 84 days then the inactive for 7 days, Disp: 112 tablet, Rfl: 1    omeprazole (PriLOSEC) 40 MG capsule, Take 1 capsule (40 mg total) by mouth daily, Disp: 90 capsule, Rfl: 3    SUMAtriptan (IMITREX) 50 mg tablet, Take 1 tablet (50 mg total) by mouth once as needed for migraine for up to 30 doses, Disp: 30 tablet, Rfl: 0    sucralfate (CARAFATE) 1 g/10 mL suspension, Take 10 mL (1 g total) by mouth 4 (four) times a day (with meals and at bedtime), Disp: 1200 mL, Rfl: 0    SUMAtriptan (IMITREX) 50 mg tablet, Take 1 tablet (50 mg total) by mouth once as needed for migraine for up to 30 doses, Disp: 30 tablet, Rfl: 0    Past Medical History:       Past Medical History:   Diagnosis Date    Bipolar II disorder ([facility]) 2015    Class 3 severe obesity due to excess calories without serious comorbidity with body mass index (BMI) of 40.0 to 44.9 in adult [facility]) 2020    Disease of thyroid gland     GERD (gastroesophageal reflux disease)     Primary osteoarthritis of left knee 2020        Past Surgical History:   Procedure Laterality Date     SECTION  2004    PREGNANCY AT TERM        Family History   Problem Relation Age of Onset    Thyroid disease Mother         DISORDER    Lung cancer Father     Diabetes Father         MELLITUS    Hypertension Father     No Known Problems Daughter     No Known Problems Maternal Grandmother     No Known Problems Maternal Grandfather     No Known Problems Paternal Grandmother     No Known Problems Paternal Grandfather     No Known Problems Maternal Aunt     No Known Problems Maternal Aunt         Social History     Socioeconomic History    Marital status: Single     Spouse name: Not on file    Number of children: 1    Years of education: Not on file    Highest education level: Not on file   Occupational History    Not on file   Tobacco Use    Smoking status: Never    Smokeless tobacco: Never    Tobacco comments:     NO TOBACCO USE   Vaping Use    Vaping Use: Never used   Substance and Sexual Activity    Alcohol use: No    Drug use: No    Sexual activity: Never     Birth control/protection: Other     Comment: Amneal   Other Topics Concern    Not on file   Social History Narrative    Not on file     Social Determinants of Health     Financial Resource Strain: Low Risk  (12/22/2022)    Overall Financial Resource Strain (CARDIA)     Difficulty of Paying Living Expenses: Not hard at all   Food Insecurity: Not on file   Transportation Needs: No Transportation Needs (12/22/2022)    PRAPARE - Transportation     Lack of Transportation (Medical): No     Lack of Transportation (Non-Medical): No   Physical Activity: Not on file   Stress: Not on file   Social Connections: Not on file   Intimate Partner Violence: Not on file   Housing Stability: Not on file        Physical Exam:     /76 (BP Location: Left arm, Patient Position: Sitting, Cuff Size: Adult)   Pulse 79   Temp 97.9 °F (36.6 °C) (Temporal)   Ht 5' 4" (1.626 m)   Wt 70.1 kg (154 lb 9.6 oz)   SpO2 96%   BMI 26.54 kg/m²     Physical Exam  Vitals and nursing note reviewed. Constitutional:       Appearance: Normal appearance. She is normal weight. HENT:      Head: Normocephalic. Right Ear: Tympanic membrane, ear canal and external ear normal. There is no impacted cerumen. Left Ear: Tympanic membrane, ear canal and external ear normal. There is no impacted cerumen. Nose: Nose normal.      Mouth/Throat:      Mouth: Mucous membranes are moist.      Pharynx: Oropharynx is clear. Eyes:      Extraocular Movements: Extraocular movements intact. Conjunctiva/sclera: Conjunctivae normal.      Pupils: Pupils are equal, round, and reactive to light. Cardiovascular:      Rate and Rhythm: Normal rate and regular rhythm. Pulses:           Dorsalis pedis pulses are 3+ on the right side and 3+ on the left side. Posterior tibial pulses are 3+ on the right side and 3+ on the left side. Heart sounds: Normal heart sounds. Pulmonary:      Effort: Pulmonary effort is normal.      Breath sounds: Normal breath sounds. Abdominal:      General: Bowel sounds are normal. There is no distension. Palpations: Abdomen is soft. Tenderness: There is no abdominal tenderness. Musculoskeletal:         General: Normal range of motion. Cervical back: Normal range of motion. Left foot: Bunion present. Feet:      Right foot:      Skin integrity: Skin integrity normal.      Left foot:      Skin integrity: Skin integrity normal.   Skin:     General: Skin is warm. Capillary Refill: Capillary refill takes less than 2 seconds. Neurological:      General: No focal deficit present. Mental Status: She is alert and oriented to person, place, and time. Mental status is at baseline. Psychiatric:         Mood and Affect: Mood normal.         Behavior: Behavior normal.         Thought Content: Thought content normal.         Judgment: Judgment normal.          Data:     Pre-operative work-up    Laboratory Results: I have personally reviewed the pertinent laboratory results/reports      EKG: I have personally reviewed pertinent reports. Chest x-ray: I have personally reviewed pertinent reports.         Previous cardiopulmonary studies within the past year:  Echocardiogram: n/a  Cardiac Catheterization: n/a  Stress Test: n/a  Pulmonary Function Testing: n/a      Assessment & Recommendations:     No diagnosis found. Pre-Op Evaluation Assessment  53 y.o. female with planned surgery: Left bunion removal.    Known risk factors for perioperative complications: None. Current medications which may produce withdrawal symptoms if withheld perioperatively: none. Pre-Op Evaluation Plan  1. Further preoperative workup as follows:   - Complete blood count  - Basic metabolic profile  Lab results reviewed, within normal limits     2. Medication Management/Recommendations:   - None, continue medication regimen including morning of surgery, with sip of water    3. Prophylaxis for cardiac events with perioperative beta-blockers: not indicated. 4. Patient requires further consultation with: None    Clearance  Patient is CLEARED for surgery without any additional cardiac testing.      Kim De La Cruz, [address] [place] [id number]  Phone#  [phone]  Fax#  [phone]

## 2023-12-07 NOTE — LETTER
2023     Alexa Bates, [address] [address]    Patient: Arnaud Arriaga   YOB: 1975   Date of Visit: 2023       Dear Dr. Dameon Kuhn: Thank you for referring Arnaud Arriaga to me for evaluation. Below are my notes for this consultation. If you have questions, please do not hesitate to call me. I look forward to following your patient along with you. Sincerely,        MIKI Trinh        CC: No Recipients    Andra Alonso, [address] [place] [address]  2023 11:52 AM  In Progress  [facility]    NAME: Arnaud Arriaga  AGE: 53 y.o. SEX: female  : 1975     DATE: 2023    [facility] Pre-Operative Evaluation      Chief Complaint: Pre-operative Evaluation     Surgery: left bunionectomy   Anticipated Date of Surgery: 23  Referring Provider: No ref. provider found       History of Present Illness:     Arnaud Arriaga is a 53 y.o. female who presents to the office today for a preoperative consultation at the request of surgeon, [facility], who plans on performing left bunionectomy  on 23. Planned anesthesia is general. Patient has a bleeding risk of: no recent abnormal bleeding. Patient does not have objections to receiving blood products if needed. Current anti-platelet/anti-coagulation medications that the patient is prescribed includes:  none .       Assessment of Chronic Conditions:   - none     Assessment of Cardiac Risk:  Denies unstable or severe angina or MI in the last 6 weeks or history of stent placement in the last year   Denies decompensated heart failure (e.g. New onset heart failure, NYHA functional class IV heart failure, or worsening existing heart failure)  Denies significant arrhythmias such as high grade AV block, symptomatic ventricular arrhythmia, newly recognized ventricular tachycardia, supraventricular tachycardia with resting heart rate >100, or symptomatic bradycardia  Denies severe heart valve disease including aortic stenosis or symptomatic mitral stenosis     Exercise Capacity:  Able to walk 4 blocks without symptoms?: Yes  Able to walk 2 flights without symptoms?: Yes    Prior Anesthesia Reactions: No     Personal history of venous thromboembolic disease? No    History of steroid use for >2 weeks within last year? No         Review of Systems:     Review of Systems   Constitutional:  Negative for activity change, chills, fatigue and fever. HENT:  Negative for congestion, ear pain, rhinorrhea, sore throat and trouble swallowing. Eyes:  Negative for pain and visual disturbance. Respiratory:  Negative for cough, chest tightness and shortness of breath. Cardiovascular:  Negative for chest pain, palpitations and leg swelling. Gastrointestinal:  Negative for abdominal pain, constipation, diarrhea, nausea and vomiting. Genitourinary:  Negative for difficulty urinating, dysuria, hematuria and urgency. Musculoskeletal:  Positive for gait problem. Negative for arthralgias and back pain. Skin:  Negative for color change and rash. Neurological:  Negative for dizziness, seizures, syncope and headaches. Psychiatric/Behavioral:  Negative for dysphoric mood. The patient is not nervous/anxious. All other systems reviewed and are negative. Current Problem List:     Patient Active Problem List   Diagnosis   • Hypothyroidism   • Depression   • Primary osteoarthritis of left knee   • Encounter for screening mammogram for malignant neoplasm of breast    • BMI 26.0-26.9,adult   • Bronchitis   • Vitamin D deficiency   • Elevated cholesterol   • GERD (gastroesophageal reflux disease)   • Migraine       Allergies:      Allergies   Allergen Reactions   • No Active Allergies        Current Medications:       Current Outpatient Medications:   •  calcium carbonate (OS-AUGIE) 600 MG tablet, every 24 hours, Disp: , Rfl:   •  cetirizine (ZyrTEC) 10 mg tablet, TAKE 1 TABLET BY MOUTH EVERY DAY, Disp: 90 tablet, Rfl: 1  •  Cholecalciferol 5000 units TABS, take 1 capsule by oral route  every day start after completing weekly dose, Disp: , Rfl:   •  clotrimazole-betamethasone (LOTRISONE) 1-0.05 % cream, Apply topically 2 (two) times a day, Disp: 30 g, Rfl: 0  •  fexofenadine (ALLEGRA) 60 MG tablet, Take 1 tablet (60 mg total) by mouth daily, Disp: 90 tablet, Rfl: 0  •  hydrocortisone 2.5 % ointment, Apply topically 2 (two) times a day, Disp: 30 g, Rfl: 0  •  ibuprofen (MOTRIN) 600 mg tablet, Take 1 tablet (600 mg total) by mouth every 6 (six) hours as needed for mild pain, Disp: 30 tablet, Rfl: 0  •  levothyroxine 50 mcg tablet, Take 1 tablet (50 mcg total) by mouth daily, Disp: 90 tablet, Rfl: 3  •  loratadine (CLARITIN) 10 mg tablet, TAKE 1 TABLET BY MOUTH EVERY DAY, Disp: 90 tablet, Rfl: 1  •  meloxicam (MOBIC) 15 mg tablet, Take 15 mg by mouth daily, Disp: , Rfl:   •  methocarbamol (ROBAXIN) 750 mg tablet, Take 750 mg by mouth every 6 (six) hours as needed, Disp: , Rfl:   •  norethindrone-ethinyl estradiol (Junel FE 1/20) 1-20 MG-MCG per tablet, Take 1 tablet by mouth daily Take 1 active tablet for 84 days then the inactive for 7 days, Disp: 112 tablet, Rfl: 1  •  omeprazole (PriLOSEC) 40 MG capsule, Take 1 capsule (40 mg total) by mouth daily, Disp: 90 capsule, Rfl: 3  •  SUMAtriptan (IMITREX) 50 mg tablet, Take 1 tablet (50 mg total) by mouth once as needed for migraine for up to 30 doses, Disp: 30 tablet, Rfl: 0  •  sucralfate (CARAFATE) 1 g/10 mL suspension, Take 10 mL (1 g total) by mouth 4 (four) times a day (with meals and at bedtime), Disp: 1200 mL, Rfl: 0  •  SUMAtriptan (IMITREX) 50 mg tablet, Take 1 tablet (50 mg total) by mouth once as needed for migraine for up to 30 doses, Disp: 30 tablet, Rfl: 0    Past Medical History:       Past Medical History:   Diagnosis Date   • Bipolar II disorder ([address]) 8/12/2015   • Class 3 severe obesity due to excess calories without serious comorbidity with body mass index (BMI) of 40.0 to 44.9 in adult [facility]) 2020   • Disease of thyroid gland    • GERD (gastroesophageal reflux disease)    • Primary osteoarthritis of left knee 2020        Past Surgical History:   Procedure Laterality Date   •  SECTION  2004    PREGNANCY AT TERM        Family History   Problem Relation Age of Onset   • Thyroid disease Mother         DISORDER   • Lung cancer Father    • Diabetes Father         MELLITUS   • Hypertension Father    • No Known Problems Daughter    • No Known Problems Maternal Grandmother    • No Known Problems Maternal Grandfather    • No Known Problems Paternal Grandmother    • No Known Problems Paternal Grandfather    • No Known Problems Maternal Aunt    • No Known Problems Maternal Aunt         Social History     Socioeconomic History   • Marital status: Single     Spouse name: Not on file   • Number of children: 1   • Years of education: Not on file   • Highest education level: Not on file   Occupational History   • Not on file   Tobacco Use   • Smoking status: Never   • Smokeless tobacco: Never   • Tobacco comments:     NO TOBACCO USE   Vaping Use   • Vaping Use: Never used   Substance and Sexual Activity   • Alcohol use: No   • Drug use: No   • Sexual activity: Never     Birth control/protection: Other     Comment: Amneal   Other Topics Concern   • Not on file   Social History Narrative   • Not on file     Social Determinants of Health     Financial Resource Strain: Low Risk  (2022)    Overall Financial Resource Strain (CARDIA)    • Difficulty of Paying Living Expenses: Not hard at all   Food Insecurity: Not on file   Transportation Needs: No Transportation Needs (2022)    PRAPARE - Transportation    • Lack of Transportation (Medical): No    • Lack of Transportation (Non-Medical):  No   Physical Activity: Not on file   Stress: Not on file   Social Connections: Not on file   Intimate Partner Violence: Not on file   Housing Stability: Not on file        Physical Exam:     /76 (BP Location: Left arm, Patient Position: Sitting, Cuff Size: Adult)   Pulse 79   Temp 97.9 °F (36.6 °C) (Temporal)   Ht 5' 4" (1.626 m)   Wt 70.1 kg (154 lb 9.6 oz)   SpO2 96%   BMI 26.54 kg/m²     Physical Exam  Vitals and nursing note reviewed. Constitutional:       Appearance: Normal appearance. She is normal weight. HENT:      Head: Normocephalic. Right Ear: Tympanic membrane, ear canal and external ear normal. There is no impacted cerumen. Left Ear: Tympanic membrane, ear canal and external ear normal. There is no impacted cerumen. Nose: Nose normal.      Mouth/Throat:      Mouth: Mucous membranes are moist.      Pharynx: Oropharynx is clear. Eyes:      Extraocular Movements: Extraocular movements intact. Conjunctiva/sclera: Conjunctivae normal.      Pupils: Pupils are equal, round, and reactive to light. Cardiovascular:      Rate and Rhythm: Normal rate and regular rhythm. Pulses:           Dorsalis pedis pulses are 3+ on the right side and 3+ on the left side. Posterior tibial pulses are 3+ on the right side and 3+ on the left side. Heart sounds: Normal heart sounds. Pulmonary:      Effort: Pulmonary effort is normal.      Breath sounds: Normal breath sounds. Abdominal:      General: Bowel sounds are normal. There is no distension. Palpations: Abdomen is soft. Tenderness: There is no abdominal tenderness. Musculoskeletal:         General: Normal range of motion. Cervical back: Normal range of motion. Left foot: Bunion present. Feet:      Right foot:      Skin integrity: Skin integrity normal.      Left foot:      Skin integrity: Skin integrity normal.   Skin:     General: Skin is warm. Capillary Refill: Capillary refill takes less than 2 seconds.    Neurological:      General: No focal deficit present. Mental Status: She is alert and oriented to person, place, and time. Mental status is at baseline. Psychiatric:         Mood and Affect: Mood normal.         Behavior: Behavior normal.         Thought Content: Thought content normal.         Judgment: Judgment normal.          Data:     Pre-operative work-up    Laboratory Results: I have personally reviewed the pertinent laboratory results/reports      EKG: I have personally reviewed pertinent reports. Chest x-ray: I have personally reviewed pertinent reports. Previous cardiopulmonary studies within the past year:  Echocardiogram: n/a  Cardiac Catheterization: n/a  Stress Test: n/a  Pulmonary Function Testing: n/a      Assessment & Recommendations:     No diagnosis found. Pre-Op Evaluation Assessment  53 y.o. female with planned surgery: Left bunion removal.    Known risk factors for perioperative complications: None. Current medications which may produce withdrawal symptoms if withheld perioperatively: none. Pre-Op Evaluation Plan  1. Further preoperative workup as follows:   - Complete blood count  - Basic metabolic profile  Lab results reviewed, within normal limits     2. Medication Management/Recommendations:   - None, continue medication regimen including morning of surgery, with sip of water    3. Prophylaxis for cardiac events with perioperative beta-blockers: not indicated. 4. Patient requires further consultation with: None    Clearance  Patient is CLEARED for surgery without any additional cardiac testing. MIKI Turner  [address] [address]  Phone#  [phone]  Fax#  [phone]    MIKI Turner  2023 11:58 AM  Incomplete [facility] PRE-OPERATIVE EV[facility]UATION  [facility]    NAME: [facility]  AGE: 53 y.o.  SEX: female  : 1975     DATE: 12/7/2023    [facility] Pre-Operative Evaluation      Chief Complaint: Pre-operative Evaluation     Surgery: left bunionectomy   Anticipated Date of Surgery: 12/28/23  Referring Provider: Ba Bull DPM       History of Present Illness:     Mindy Aleman is a 53 y.o. female who presents to the office today for a preoperative consultation at the request of surgeon, [facility]- Ba Bull, who plans on performing left bunionectomy  on 12/28/23. Planned anesthesia is general. Patient has a bleeding risk of: no recent abnormal bleeding. Patient does not have objections to receiving blood products if needed. Current anti-platelet/anti-coagulation medications that the patient is prescribed includes:  none . Assessment of Chronic Conditions:   - none     Assessment of Cardiac Risk:  Denies unstable or severe angina or MI in the last 6 weeks or history of stent placement in the last year   Denies decompensated heart failure (e.g. New onset heart failure, NYHA functional class IV heart failure, or worsening existing heart failure)  Denies significant arrhythmias such as high grade AV block, symptomatic ventricular arrhythmia, newly recognized ventricular tachycardia, supraventricular tachycardia with resting heart rate >100, or symptomatic bradycardia  Denies severe heart valve disease including aortic stenosis or symptomatic mitral stenosis     Exercise Capacity:  Able to walk 4 blocks without symptoms?: Yes  Able to walk 2 flights without symptoms?: Yes    Prior Anesthesia Reactions: No     Personal history of venous thromboembolic disease? No    History of steroid use for >2 weeks within last year? No         Review of Systems:     Review of Systems   Constitutional:  Negative for activity change, chills, fatigue and fever. HENT:  Negative for congestion, ear pain, rhinorrhea, sore throat and trouble swallowing. Eyes:  Negative for pain and visual disturbance.    Respiratory:  Negative for cough, chest tightness and shortness of breath. Cardiovascular:  Negative for chest pain, palpitations and leg swelling. Gastrointestinal:  Negative for abdominal pain, constipation, diarrhea, nausea and vomiting. Genitourinary:  Negative for difficulty urinating, dysuria, hematuria and urgency. Musculoskeletal:  Positive for gait problem. Negative for arthralgias and back pain. Skin:  Negative for color change and rash. Neurological:  Negative for dizziness, seizures, syncope and headaches. Psychiatric/Behavioral:  Negative for dysphoric mood. The patient is not nervous/anxious. All other systems reviewed and are negative. Current Problem List:     Patient Active Problem List   Diagnosis   • Hypothyroidism   • Depression   • Primary osteoarthritis of left knee   • Encounter for screening mammogram for malignant neoplasm of breast    • BMI 26.0-26.9,adult   • Bronchitis   • Vitamin D deficiency   • Elevated cholesterol   • GERD (gastroesophageal reflux disease)   • Migraine       Allergies:      Allergies   Allergen Reactions   • No Active Allergies        Current Medications:       Current Outpatient Medications:   •  calcium carbonate (OS-AUGIE) 600 MG tablet, every 24 hours, Disp: , Rfl:   •  cetirizine (ZyrTEC) 10 mg tablet, TAKE 1 TABLET BY MOUTH EVERY DAY, Disp: 90 tablet, Rfl: 1  •  Cholecalciferol 5000 units TABS, take 1 capsule by oral route  every day start after completing weekly dose, Disp: , Rfl:   •  clotrimazole-betamethasone (LOTRISONE) 1-0.05 % cream, Apply topically 2 (two) times a day, Disp: 30 g, Rfl: 0  •  fexofenadine (ALLEGRA) 60 MG tablet, Take 1 tablet (60 mg total) by mouth daily, Disp: 90 tablet, Rfl: 0  •  hydrocortisone 2.5 % ointment, Apply topically 2 (two) times a day, Disp: 30 g, Rfl: 0  •  ibuprofen (MOTRIN) 600 mg tablet, Take 1 tablet (600 mg total) by mouth every 6 (six) hours as needed for mild pain, Disp: 30 tablet, Rfl: 0  •  levothyroxine 50 mcg tablet, Take 1 tablet (50 mcg total) by mouth daily, Disp: 90 tablet, Rfl: 3  •  loratadine (CLARITIN) 10 mg tablet, TAKE 1 TABLET BY MOUTH EVERY DAY, Disp: 90 tablet, Rfl: 1  •  meloxicam (MOBIC) 15 mg tablet, Take 15 mg by mouth daily, Disp: , Rfl:   •  methocarbamol (ROBAXIN) 750 mg tablet, Take 750 mg by mouth every 6 (six) hours as needed, Disp: , Rfl:   •  norethindrone-ethinyl estradiol (Junel FE 1/20) 1-20 MG-MCG per tablet, Take 1 tablet by mouth daily Take 1 active tablet for 84 days then the inactive for 7 days, Disp: 112 tablet, Rfl: 1  •  omeprazole (PriLOSEC) 40 MG capsule, Take 1 capsule (40 mg total) by mouth daily, Disp: 90 capsule, Rfl: 3  •  SUMAtriptan (IMITREX) 50 mg tablet, Take 1 tablet (50 mg total) by mouth once as needed for migraine for up to 30 doses, Disp: 30 tablet, Rfl: 0  •  sucralfate (CARAFATE) 1 g/10 mL suspension, Take 10 mL (1 g total) by mouth 4 (four) times a day (with meals and at bedtime), Disp: 1200 mL, Rfl: 0  •  SUMAtriptan (IMITREX) 50 mg tablet, Take 1 tablet (50 mg total) by mouth once as needed for migraine for up to 30 doses, Disp: 30 tablet, Rfl: 0    Past Medical History:       Past Medical History:   Diagnosis Date   • Bipolar II disorder ([facility]) 2015   • Class 3 severe obesity due to excess calories without serious comorbidity with body mass index (BMI) of 40.0 to 44.9 in adult [facility]) 2020   • Disease of thyroid gland    • GERD (gastroesophageal reflux disease)    • Primary osteoarthritis of left knee 2020        Past Surgical History:   Procedure Laterality Date   •  SECTION  2004    PREGNANCY AT TERM        Family History   Problem Relation Age of Onset   • Thyroid disease Mother         DISORDER   • Lung cancer Father    • Diabetes Father         MELLITUS   • Hypertension Father    • No Known Problems Daughter    • No Known Problems Maternal Grandmother    • No Known Problems Maternal Grandfather    • No Known Problems Paternal Grandmother    • No Known Problems Paternal Grandfather    • No Known Problems Maternal Aunt    • No Known Problems Maternal Aunt         Social History     Socioeconomic History   • Marital status: Single     Spouse name: Not on file   • Number of children: 1   • Years of education: Not on file   • Highest education level: Not on file   Occupational History   • Not on file   Tobacco Use   • Smoking status: Never   • Smokeless tobacco: Never   • Tobacco comments:     NO TOBACCO USE   Vaping Use   • Vaping Use: Never used   Substance and Sexual Activity   • Alcohol use: No   • Drug use: No   • Sexual activity: Never     Birth control/protection: Other     Comment: Amneal   Other Topics Concern   • Not on file   Social History Narrative   • Not on file     Social Determinants of Health     Financial Resource Strain: Low Risk  (12/22/2022)    Overall Financial Resource Strain (CARDIA)    • Difficulty of Paying Living Expenses: Not hard at all   Food Insecurity: Not on file   Transportation Needs: No Transportation Needs (12/22/2022)    PRAPARE - Transportation    • Lack of Transportation (Medical): No    • Lack of Transportation (Non-Medical): No   Physical Activity: Not on file   Stress: Not on file   Social Connections: Not on file   Intimate Partner Violence: Not on file   Housing Stability: Not on file        Physical Exam:     /76 (BP Location: Left arm, Patient Position: Sitting, Cuff Size: Adult)   Pulse 79   Temp 97.9 °F (36.6 °C) (Temporal)   Ht 5' 4" (1.626 m)   Wt 70.1 kg (154 lb 9.6 oz)   SpO2 96%   BMI 26.54 kg/m²     Physical Exam  Vitals and nursing note reviewed. Constitutional:       Appearance: Normal appearance. She is normal weight. HENT:      Head: Normocephalic. Right Ear: Tympanic membrane, ear canal and external ear normal. There is no impacted cerumen. Left Ear: Tympanic membrane, ear canal and external ear normal. There is no impacted cerumen.       Nose: Nose normal. Mouth/Throat:      Mouth: Mucous membranes are moist.      Pharynx: Oropharynx is clear. Eyes:      Extraocular Movements: Extraocular movements intact. Conjunctiva/sclera: Conjunctivae normal.      Pupils: Pupils are equal, round, and reactive to light. Cardiovascular:      Rate and Rhythm: Normal rate and regular rhythm. Pulses:           Dorsalis pedis pulses are 3+ on the right side and 3+ on the left side. Posterior tibial pulses are 3+ on the right side and 3+ on the left side. Heart sounds: Normal heart sounds. Pulmonary:      Effort: Pulmonary effort is normal.      Breath sounds: Normal breath sounds. Abdominal:      General: Bowel sounds are normal. There is no distension. Palpations: Abdomen is soft. Tenderness: There is no abdominal tenderness. Musculoskeletal:         General: Normal range of motion. Cervical back: Normal range of motion. Left foot: Bunion present. Feet:      Right foot:      Skin integrity: Skin integrity normal.      Left foot:      Skin integrity: Skin integrity normal.   Skin:     General: Skin is warm. Capillary Refill: Capillary refill takes less than 2 seconds. Neurological:      General: No focal deficit present. Mental Status: She is alert and oriented to person, place, and time. Mental status is at baseline. Psychiatric:         Mood and Affect: Mood normal.         Behavior: Behavior normal.         Thought Content: Thought content normal.         Judgment: Judgment normal.          Data:     Pre-operative work-up    Laboratory Results: I have personally reviewed the pertinent laboratory results/reports      EKG: I have personally reviewed pertinent reports. Chest x-ray: I have personally reviewed pertinent reports.         Previous cardiopulmonary studies within the past year:  Echocardiogram: n/a  Cardiac Catheterization: n/a  Stress Test: n/a  Pulmonary Function Testing: n/a      Assessment & Recommendations:     No diagnosis found. Pre-Op Evaluation Assessment  53 y.o. female with planned surgery: Left bunion removal.    Known risk factors for perioperative complications: None. Current medications which may produce withdrawal symptoms if withheld perioperatively: none. Pre-Op Evaluation Plan  1. Further preoperative workup as follows:   - Complete blood count  - Basic metabolic profile  Lab results reviewed, within normal limits     2. Medication Management/Recommendations:   - None, continue medication regimen including morning of surgery, with sip of water    3. Prophylaxis for cardiac events with perioperative beta-blockers: not indicated. 4. Patient requires further consultation with: None    Clearance  Patient is CLEARED for surgery without any additional cardiac testing.      Nicko Piedra, [address] [place] [address] GROUP  [address] [place] [id number]  Phone#  [phone]  Fax#  [phone]

## 2023-12-19 NOTE — PRE-PROCEDURE INSTRUCTIONS
Pre-Surgery Instructions:   Medication Instructions    Aspirin-Acetaminophen-Caffeine (EXCEDRIN PO) Stop taking 7 days prior to surgery.    cetirizine (ZyrTEC) 10 mg tablet Take day of surgery.    ibuprofen (MOTRIN) 600 mg tablet Stop taking 7 days prior to surgery.    levothyroxine 50 mcg tablet Take day of surgery.    norethindrone-ethinyl estradiol (Junel FE 1/20) 1-20 MG-MCG per tablet Take night before surgery    omeprazole (PriLOSEC) 40 MG capsule Take night before surgery   Medication instructions for day surgery reviewed. Please use only a sip of water to take your instructed medications. Avoid all over the counter vitamins, supplements and NSAIDS for one week prior to surgery per anesthesia guidelines. Tylenol is ok to take as needed.     You will receive a call one business day prior to surgery with an arrival time and hospital directions. If your surgery is scheduled on a Monday, the hospital will be calling you on the Friday prior to your surgery. If you have not heard from anyone by 8pm, please call the hospital supervisor through the hospital  at [phone]. ([place] [phone]).    Do not eat or drink anything after midnight the night before your surgery, including candy, mints, lifesavers, or chewing gum. Do not drink alcohol 24hrs before your surgery. Try not to smoke at least 24hrs before your surgery.       Follow the pre surgery showering instructions as listed in the “My Surgical Experience Booklet” or otherwise provided by your surgeon's office. Do not use a blade to shave the surgical area 1 week before surgery. It is okay to use a clean electric clippers up to 24 hours before surgery. Do not apply any lotions, creams, including makeup, cologne, deodorant, or perfumes after showering on the day of your surgery. Do not use dry shampoo, hair spray, hair gel, or any type of hair products.     No contact lenses, eye make-up, or artificial eyelashes. Remove nail polish, including gel  polish, and any artificial, gel, or acrylic nails if possible. Remove all jewelry including rings and body piercing jewelry.     Wear causal clothing that is easy to take on and off. Consider your type of surgery.    Keep any valuables, jewelry, piercings at home. Please bring any specially ordered equipment (sling, braces) if indicated.    Arrange for a responsible person to drive you to and from the hospital on the day of your surgery. Visitor Guidelines discussed.     Call the surgeon's office with any new illnesses, exposures, or additional questions prior to surgery.    Please reference your “My Surgical Experience Booklet” for additional information to prepare for your upcoming surgery.

## 2023-12-22 ENCOUNTER — HOSPITAL ENCOUNTER (EMERGENCY)
Facility: HOSPITAL | Age: 48
Discharge: HOME | End: 2023-12-22
Attending: EMERGENCY MEDICINE
Payer: COMMERCIAL

## 2023-12-22 ENCOUNTER — APPOINTMENT (EMERGENCY)
Dept: RADIOLOGY | Facility: HOSPITAL | Age: 48
End: 2023-12-22
Payer: COMMERCIAL

## 2023-12-22 VITALS
DIASTOLIC BLOOD PRESSURE: 85 MMHG | OXYGEN SATURATION: 98 % | BODY MASS INDEX: 26.19 KG/M2 | WEIGHT: 152.6 LBS | HEART RATE: 69 BPM | RESPIRATION RATE: 18 BRPM | SYSTOLIC BLOOD PRESSURE: 158 MMHG | TEMPERATURE: 98 F

## 2023-12-22 DIAGNOSIS — S39.012A STRAIN OF LUMBAR REGION, INITIAL ENCOUNTER: ICD-10-CM

## 2023-12-22 DIAGNOSIS — S63.509A WRIST SPRAIN: ICD-10-CM
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
## 2023-12-22 PROCEDURE — 72100 X-RAY EXAM L-S SPINE 2/3 VWS: CPT

## 2023-12-22 PROCEDURE — 99284 EMERGENCY DEPT VISIT MOD MDM: CPT | Performed by: EMERGENCY MEDICINE

## 2023-12-22 PROCEDURE — 73110 X-RAY EXAM OF WRIST: CPT

## 2023-12-22 PROCEDURE — 99284 EMERGENCY DEPT VISIT MOD MDM: CPT

## 2023-12-22 RX ORDER — IBUPROFEN 400 MG/1
400 TABLET ORAL EVERY 6 HOURS PRN
Qty: 30 TABLET | Refills: 0 | Status: SHIPPED | OUTPATIENT
Start: 2023-12-22 | End: 2023-12-29

## 2023-12-22 RX ORDER — IBUPROFEN 400 MG/1
400 TABLET ORAL ONCE
Status: COMPLETED | OUTPATIENT
Start: 2023-12-22 | End: 2023-12-22

## 2023-12-22 RX ORDER — CYCLOBENZAPRINE HCL 10 MG
10 TABLET ORAL 2 TIMES DAILY PRN
Qty: 20 TABLET | Refills: 0 | Status: SHIPPED | OUTPATIENT
Start: 2023-12-22

## 2023-12-22 RX ADMIN — IBUPROFEN 400 MG: 400 TABLET ORAL at 16:55
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
## 2023-12-24 ENCOUNTER — HOSPITAL ENCOUNTER (EMERGENCY)
Facility: HOSPITAL | Age: 48
Discharge: HOME | End: 2023-12-24
Attending: EMERGENCY MEDICINE | Admitting: EMERGENCY MEDICINE
Payer: COMMERCIAL

## 2023-12-24 ENCOUNTER — APPOINTMENT (EMERGENCY)
Dept: CT IMAGING | Facility: HOSPITAL | Age: 48
End: 2023-12-24
Payer: COMMERCIAL

## 2023-12-24 VITALS
RESPIRATION RATE: 16 BRPM | BODY MASS INDEX: 26.3 KG/M2 | SYSTOLIC BLOOD PRESSURE: 155 MMHG | TEMPERATURE: 98.8 F | DIASTOLIC BLOOD PRESSURE: 89 MMHG | HEART RATE: 65 BPM | WEIGHT: 153.2 LBS | OXYGEN SATURATION: 99 %

## 2023-12-24 DIAGNOSIS — S06.0XAA CONCUSSION: ICD-10-CM

## 2023-12-24 DIAGNOSIS — R51.9 HEADACHE: Primary | ICD-10-CM

## 2023-12-24 LAB
EXT PREGNANCY TEST URINE: NEGATIVE
EXT. CONTROL: NORMAL

## 2023-12-24 PROCEDURE — 99284 EMERGENCY DEPT VISIT MOD MDM: CPT

## 2023-12-24 PROCEDURE — 70450 CT HEAD/BRAIN W/O DYE: CPT

## 2023-12-24 PROCEDURE — 81025 URINE PREGNANCY TEST: CPT

## 2023-12-24 PROCEDURE — G1004 CDSM NDSC: HCPCS

## 2023-12-24 RX ORDER — ACETAMINOPHEN 325 MG/1
650 TABLET ORAL ONCE
Status: COMPLETED | OUTPATIENT
Start: 2023-12-24 | End: 2023-12-24

## 2023-12-24 RX ADMIN — ACETAMINOPHEN 650 MG: 325 TABLET ORAL at 16:06

## 2023-12-24 NOTE — DISCHARGE INSTRUCTIONS
- If you / family member develop any of the following, please return to the Emergency Department for re-evaluation and possible repeat imaging:  Inability to awaken patient at time of expected awakening; you don't have to purposely awaken the patient every hour though  Severe/worsening headache  Somnolence / confusion / excessive sleepiness   Restless, unsteadiness  Seizures  Difficulties with vision  Vomiting, fever, stiff neck  Incontinence of poop or urine  New weakness or numbness anywhere    The most important part of concussion is to avoid the next one. No contact sports until you're cleared by your family doctor. This includes not just play time but also practice.     There used to be older recommendations about concussions that you can't do anything that involve thinking. It's okay to return to activities that you feel you can tolerate after a day. Longer periods of rest haven't been shown to be beneficial and in fact might prolong concussive symptoms.     The majority of symptoms of a concussion for most people last the first 7-10 days. Rarely does it go beyond 1 month.
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
## 2023-12-26 ENCOUNTER — OFFICE VISIT (OUTPATIENT)
Dept: FAMILY MEDICINE CLINIC | Facility: CLINIC | Age: 48
End: 2023-12-26
Payer: COMMERCIAL

## 2023-12-26 VITALS
HEART RATE: 67 BPM | HEIGHT: 64 IN | OXYGEN SATURATION: 95 % | BODY MASS INDEX: 26.12 KG/M2 | WEIGHT: 153 LBS | TEMPERATURE: 98.1 F | DIASTOLIC BLOOD PRESSURE: 80 MMHG | SYSTOLIC BLOOD PRESSURE: 112 MMHG

## 2023-12-26 DIAGNOSIS — Z01.411 ENCOUNTER FOR GYNECOLOGICAL EXAMINATION WITH ABNORMAL FINDING: Primary | ICD-10-CM

## 2023-12-26 DIAGNOSIS — Z30.019 ENCOUNTER FOR FEMALE BIRTH CONTROL: ICD-10-CM

## 2023-12-26 PROCEDURE — G0101 CA SCREEN;PELVIC/BREAST EXAM: HCPCS

## 2023-12-26 PROCEDURE — G0145 SCR C/V CYTO,THINLAYER,RESCR: HCPCS

## 2023-12-26 RX ORDER — NORETHINDRONE ACETATE AND ETHINYL ESTRADIOL 1MG-20(21)
1 KIT ORAL DAILY
Qty: 112 TABLET | Refills: 1 | Status: SHIPPED | OUTPATIENT
Start: 2023-12-26

## 2023-12-26 NOTE — PROGRESS NOTES
Subjective      Kaitlynn Richards is a 48 y.o. female who presents for annual well woman exam. Periods are regular every 28-30 days, lasting 0 days. No intermenstrual bleeding, spotting, or discharge.    GYN:  No vaginal discharge, labial erythema or lesions, dyspareunia.   Menses are not regular- cointrolled by OCPs   Contraception: OCPs.   Patient is  sexually active with 1 partner.   Last pap smear was in . Results were hpv positve but 16/18 negative. Repeat PAP today   no gynecologic surgeries.    OB:   female      :  no dysuria, urinary frequency or urgency.  no hematuria, flank pain, incontinence.    Breast:   no breast mass, skin changes, dimpling, reddening, nipple retraction.   no breast discharge.   Patient does do monthly breast exams.   Last mammogram was in . Results were benign.    Patient does not have a family history of breast, endometrial, or ovarian ca.      General:  Diet: well balanced  Exercise: 5-7 times a week  ETOH use: none  Tobacco use: denies  Recreational drug use: denies    Screening:  Cervical cancer: last pap smear in . Results were positvie .  Breast cancer: last mammogram in . Results were benging .  STD screening: declines.    Review of Systems  Review of Systems   Constitutional:  Negative for activity change, chills, fatigue and fever.   HENT:  Negative for congestion, ear pain, rhinorrhea, sore throat and trouble swallowing.    Eyes:  Negative for pain and visual disturbance.   Respiratory:  Negative for cough, chest tightness and shortness of breath.    Cardiovascular:  Negative for chest pain, palpitations and leg swelling.   Gastrointestinal:  Negative for abdominal pain, constipation, diarrhea, nausea and vomiting.   Genitourinary:  Negative for difficulty urinating, dysuria, hematuria and urgency.   Musculoskeletal:  Negative for arthralgias and back pain.   Skin:  Negative for color change and rash.   Neurological:  Negative for dizziness, seizures,  "syncope and headaches.   Psychiatric/Behavioral:  Negative for dysphoric mood. The patient is not nervous/anxious.    All other systems reviewed and are negative.          Objective      /80 (BP Location: Left arm, Patient Position: Sitting, Cuff Size: Adult)   Pulse 67   Temp 98.1 °F (36.7 °C) (Temporal)   Ht 5' 4\" (1.626 m)   Wt 69.4 kg (153 lb)   LMP  (LMP Unknown)   SpO2 95%   BMI 26.26 kg/m²     Physical Exam  Vitals and nursing note reviewed. Exam conducted with a chaperone present.   Constitutional:       General: She is not in acute distress.     Appearance: Normal appearance. She is well-developed.   HENT:      Head: Normocephalic and atraumatic.      Right Ear: Tympanic membrane, ear canal and external ear normal. There is no impacted cerumen.      Left Ear: Tympanic membrane, ear canal and external ear normal. There is no impacted cerumen.      Nose: Nose normal.      Mouth/Throat:      Mouth: Mucous membranes are moist.   Eyes:      Conjunctiva/sclera: Conjunctivae normal.      Pupils: Pupils are equal, round, and reactive to light.   Cardiovascular:      Rate and Rhythm: Normal rate and regular rhythm.      Heart sounds: No murmur heard.  Pulmonary:      Effort: Pulmonary effort is normal. No respiratory distress.      Breath sounds: Normal breath sounds.   Abdominal:      Palpations: Abdomen is soft.      Tenderness: There is no abdominal tenderness.      Hernia: There is no hernia in the left inguinal area or right inguinal area.   Genitourinary:     General: Normal vulva.      Pubic Area: No rash or pubic lice.       Labia:         Right: No rash, tenderness, lesion or injury.         Left: No rash, tenderness, lesion or injury.       Urethra: No prolapse, urethral pain, urethral swelling or urethral lesion.      Vagina: Normal.      Cervix: Normal.      Uterus: Normal.       Rectum: Normal.   Musculoskeletal:         General: Normal range of motion.      Cervical back: Neck supple.      " Right lower leg: No edema.      Left lower leg: No edema.   Lymphadenopathy:      Lower Body: No right inguinal adenopathy. No left inguinal adenopathy.   Skin:     General: Skin is warm and dry.      Capillary Refill: Capillary refill takes less than 2 seconds.   Neurological:      General: No focal deficit present.      Mental Status: She is alert and oriented to person, place, and time. Mental status is at baseline.   Psychiatric:         Mood and Affect: Mood normal.         Behavior: Behavior normal.                 Assessment   Chief Complaint    Gynecologic Exam     No diagnosis found.        Plan     40+ premenopausal  1.  Routine well woman exam done today.  2.  Pap and HPV:Pap with HPV was done today.  Current ASCCP Guidelines reviewed.  3.  Mammogram ordered. Recommend yearly mammography.   4.  The patient declined STD testing.Safe sex practices have been discussed.  5. The patient is sexually active. She agreeed to contraception and options have been discussed.    6. The following were reviewed in today's visit: breast self exam, mammography screening ordered, STD testing, HIV risk factors and prevention, use and side effects of OCPs, use and side effects of HRT, family planning choices, adequate intake of calcium and vitamin D, exercise, healthy diet, tobacco cessation, DEXA ordered, and colonoscopy discussed and ordered.  7. Patient to return to office in 12 months for GYN.           MIKI Diamond  12/26/2023  11:27 AM

## 2023-12-27 ENCOUNTER — ANESTHESIA EVENT (OUTPATIENT)
Dept: PERIOP | Facility: HOSPITAL | Age: 48
End: 2023-12-27
Payer: COMMERCIAL

## 2023-12-28 ENCOUNTER — HOSPITAL ENCOUNTER (OUTPATIENT)
Dept: RADIOLOGY | Facility: HOSPITAL | Age: 48
Setting detail: OUTPATIENT SURGERY
Discharge: HOME | End: 2023-12-28
Payer: COMMERCIAL

## 2023-12-28 ENCOUNTER — ANESTHESIA (OUTPATIENT)
Dept: PERIOP | Facility: HOSPITAL | Age: 48
End: 2023-12-28
Payer: COMMERCIAL

## 2023-12-28 ENCOUNTER — APPOINTMENT (OUTPATIENT)
Dept: RADIOLOGY | Facility: HOSPITAL | Age: 48
End: 2023-12-28
Payer: COMMERCIAL

## 2023-12-28 ENCOUNTER — HOSPITAL ENCOUNTER (OUTPATIENT)
Facility: HOSPITAL | Age: 48
Setting detail: OUTPATIENT SURGERY
Discharge: HOME | End: 2023-12-28
Attending: PODIATRIST | Admitting: PODIATRIST
Payer: COMMERCIAL

## 2023-12-28 VITALS
DIASTOLIC BLOOD PRESSURE: 57 MMHG | SYSTOLIC BLOOD PRESSURE: 104 MMHG | HEIGHT: 64 IN | TEMPERATURE: 97.8 F | BODY MASS INDEX: 26.23 KG/M2 | HEART RATE: 53 BPM | WEIGHT: 153.66 LBS | OXYGEN SATURATION: 99 % | RESPIRATION RATE: 16 BRPM

## 2023-12-28 DIAGNOSIS — M20.22 HALLUX RIGIDUS, LEFT FOOT: ICD-10-CM

## 2023-12-28 LAB
EXT PREGNANCY TEST URINE: NEGATIVE
EXT. CONTROL: NORMAL

## 2023-12-28 PROCEDURE — C1769 GUIDE WIRE: HCPCS | Performed by: PODIATRIST

## 2023-12-28 PROCEDURE — C1713 ANCHOR/SCREW BN/BN,TIS/BN: HCPCS | Performed by: PODIATRIST

## 2023-12-28 PROCEDURE — 73630 X-RAY EXAM OF FOOT: CPT

## 2023-12-28 PROCEDURE — 73620 X-RAY EXAM OF FOOT: CPT

## 2023-12-28 PROCEDURE — 81025 URINE PREGNANCY TEST: CPT | Performed by: PODIATRIST

## 2023-12-28 DEVICE — IMPLANTABLE DEVICE: Type: IMPLANTABLE DEVICE | Site: FOOT | Status: FUNCTIONAL

## 2023-12-28 DEVICE — SCREW CORT 2 X 12MM QUICKFIX SML JOINT: Type: IMPLANTABLE DEVICE | Site: FOOT | Status: FUNCTIONAL

## 2023-12-28 DEVICE — DYNANITE NITI STAPLE W/INSTRS 15WX15L
Type: IMPLANTABLE DEVICE | Site: FOOT | Status: FUNCTIONAL
Brand: ARTHREX®

## 2023-12-28 DEVICE — ARTHREX WIRE 0.062 IN ORTHO TROC TIP: Type: IMPLANTABLE DEVICE | Site: FOOT | Status: FUNCTIONAL

## 2023-12-28 RX ORDER — SODIUM CHLORIDE 9 MG/ML
125 INJECTION, SOLUTION INTRAVENOUS CONTINUOUS
Status: DISCONTINUED | OUTPATIENT
Start: 2023-12-28 | End: 2023-12-28 | Stop reason: HOSPADM

## 2023-12-28 RX ORDER — SODIUM CHLORIDE, SODIUM LACTATE, POTASSIUM CHLORIDE, CALCIUM CHLORIDE 600; 310; 30; 20 MG/100ML; MG/100ML; MG/100ML; MG/100ML
INJECTION, SOLUTION INTRAVENOUS CONTINUOUS PRN
Status: DISCONTINUED | OUTPATIENT
Start: 2023-12-28 | End: 2023-12-28

## 2023-12-28 RX ORDER — ONDANSETRON 2 MG/ML
4 INJECTION INTRAMUSCULAR; INTRAVENOUS ONCE AS NEEDED
Status: DISCONTINUED | OUTPATIENT
Start: 2023-12-28 | End: 2023-12-28 | Stop reason: HOSPADM

## 2023-12-28 RX ORDER — ACETAMINOPHEN 325 MG/1
650 TABLET ORAL EVERY 4 HOURS PRN
Status: DISCONTINUED | OUTPATIENT
Start: 2023-12-28 | End: 2023-12-28 | Stop reason: HOSPADM

## 2023-12-28 RX ORDER — CEFAZOLIN SODIUM 2 G/50ML
2000 SOLUTION INTRAVENOUS
Status: COMPLETED | OUTPATIENT
Start: 2023-12-28 | End: 2023-12-28

## 2023-12-28 RX ORDER — OXYCODONE HYDROCHLORIDE AND ACETAMINOPHEN 5; 325 MG/1; MG/1
1 TABLET ORAL EVERY 4 HOURS PRN
Status: DISCONTINUED | OUTPATIENT
Start: 2023-12-28 | End: 2023-12-28 | Stop reason: HOSPADM

## 2023-12-28 RX ORDER — MIDAZOLAM HYDROCHLORIDE 2 MG/2ML
INJECTION, SOLUTION INTRAMUSCULAR; INTRAVENOUS AS NEEDED
Status: DISCONTINUED | OUTPATIENT
Start: 2023-12-28 | End: 2023-12-28

## 2023-12-28 RX ORDER — LIDOCAINE HYDROCHLORIDE 20 MG/ML
INJECTION, SOLUTION EPIDURAL; INFILTRATION; INTRACAUDAL; PERINEURAL AS NEEDED
Status: DISCONTINUED | OUTPATIENT
Start: 2023-12-28 | End: 2023-12-28

## 2023-12-28 RX ORDER — HYDROMORPHONE HCL/PF 1 MG/ML
0.5 SYRINGE (ML) INJECTION
Status: DISCONTINUED | OUTPATIENT
Start: 2023-12-28 | End: 2023-12-28 | Stop reason: HOSPADM

## 2023-12-28 RX ORDER — PROPOFOL 10 MG/ML
INJECTION, EMULSION INTRAVENOUS AS NEEDED
Status: DISCONTINUED | OUTPATIENT
Start: 2023-12-28 | End: 2023-12-28

## 2023-12-28 RX ORDER — ONDANSETRON 2 MG/ML
INJECTION INTRAMUSCULAR; INTRAVENOUS AS NEEDED
Status: DISCONTINUED | OUTPATIENT
Start: 2023-12-28 | End: 2023-12-28

## 2023-12-28 RX ORDER — EPHEDRINE SULFATE 50 MG/ML
INJECTION INTRAVENOUS AS NEEDED
Status: DISCONTINUED | OUTPATIENT
Start: 2023-12-28 | End: 2023-12-28

## 2023-12-28 RX ORDER — FENTANYL CITRATE/PF 50 MCG/ML
50 SYRINGE (ML) INJECTION
Status: DISCONTINUED | OUTPATIENT
Start: 2023-12-28 | End: 2023-12-28 | Stop reason: HOSPADM

## 2023-12-28 RX ORDER — FENTANYL CITRATE 50 UG/ML
INJECTION, SOLUTION INTRAMUSCULAR; INTRAVENOUS AS NEEDED
Status: DISCONTINUED | OUTPATIENT
Start: 2023-12-28 | End: 2023-12-28

## 2023-12-28 RX ADMIN — ONDANSETRON 4 MG: 2 INJECTION INTRAMUSCULAR; INTRAVENOUS at 11:49

## 2023-12-28 RX ADMIN — LIDOCAINE HYDROCHLORIDE 100 MG: 20 INJECTION, SOLUTION EPIDURAL; INFILTRATION; INTRACAUDAL at 10:22

## 2023-12-28 RX ADMIN — MIDAZOLAM 2 MG: 1 INJECTION INTRAMUSCULAR; INTRAVENOUS at 10:18

## 2023-12-28 RX ADMIN — SODIUM CHLORIDE, SODIUM LACTATE, POTASSIUM CHLORIDE, AND CALCIUM CHLORIDE: .6; .31; .03; .02 INJECTION, SOLUTION INTRAVENOUS at 11:40

## 2023-12-28 RX ADMIN — PROPOFOL 50 MG: 10 INJECTION, EMULSION INTRAVENOUS at 10:24

## 2023-12-28 RX ADMIN — SODIUM CHLORIDE 125 ML/HR: 0.9 INJECTION, SOLUTION INTRAVENOUS at 09:03

## 2023-12-28 RX ADMIN — CEFAZOLIN SODIUM 2000 MG: 2 SOLUTION INTRAVENOUS at 10:15

## 2023-12-28 RX ADMIN — PROPOFOL 150 MG: 10 INJECTION, EMULSION INTRAVENOUS at 10:22

## 2023-12-28 RX ADMIN — FENTANYL CITRATE 50 MCG: 50 INJECTION INTRAMUSCULAR; INTRAVENOUS at 10:26

## 2023-12-28 RX ADMIN — FENTANYL CITRATE 50 MCG: 50 INJECTION INTRAMUSCULAR; INTRAVENOUS at 11:34

## 2023-12-28 RX ADMIN — OXYCODONE AND ACETAMINOPHEN 1 TABLET: 5; 325 TABLET ORAL at 13:28

## 2023-12-28 RX ADMIN — EPHEDRINE SULFATE 5 MG: 50 INJECTION, SOLUTION INTRAVENOUS at 10:54

## 2023-12-28 NOTE — ANESTHESIA POSTPROCEDURE EVALUATION
"Post-Op Assessment Note    CV Status:  Stable    Pain management: adequate       Mental Status:  Alert and awake   Hydration Status:  Euvolemic   PONV Controlled:  Controlled   Airway Patency:  Patent     Post Op Vitals Reviewed: Yes      Staff: Anesthesiologist               BP      Temp      Pulse     Resp      SpO2      /65   Pulse 57   Temp 97.6 °F (36.4 °C) (Temporal)   Resp 16   Ht 5' 4\" (1.626 m)   Wt 69.7 kg (153 lb 10.6 oz)   LMP  (LMP Unknown)   SpO2 96%   BMI 26.38 kg/m²     "

## 2023-12-28 NOTE — ANESTHESIA PREPROCEDURE EVALUATION
Procedure:  1ST MTPJ FUSION PSB PLANTAR PLATE REPAIR 2nd metatarsal (Left: Foot)  WEIL OSTEOTOMY (Left: Foot)    Relevant Problems   CARDIO   (+) Elevated cholesterol   (+) Migraine      ENDO   (+) Hypothyroidism      GI/HEPATIC   (+) GERD (gastroesophageal reflux disease)      MUSCULOSKELETAL   (+) Primary osteoarthritis of left knee      NEURO/PSYCH   (+) Depression   (+) Migraine      Other   (+) BMI 26.0-26.9,adult   (+) Bipolar II disorder (HCC) (Resolved)        Physical Exam    Airway    Mallampati score: II  TM Distance: >3 FB  Neck ROM: full     Dental   No notable dental hx     Cardiovascular  Rhythm: regular, Rate: normal, Cardiovascular exam normal    Pulmonary  Pulmonary exam normal Breath sounds clear to auscultation    Other Findings  post-pubertal.      Anesthesia Plan  ASA Score- 3     Anesthesia Type- general with ASA Monitors.         Additional Monitors:     Airway Plan:            Plan Factors-    Chart reviewed.   Existing labs reviewed. Patient summary reviewed.    Patient is not a current smoker. Patient not instructed to abstain from smoking on day of procedure. Patient did not smoke on day of surgery.    There is medical exclusion for perioperative obstructive sleep apnea risk education.        Induction- intravenous.    Postoperative Plan-     Informed Consent- Anesthetic plan and risks discussed with patient and sibling (Brother).

## 2023-12-28 NOTE — DISCHARGE SUMMARY
Discharge Summary Outpatient Procedure Podiatry -   Kaitlynn Richards 48 y.o. female MRN: [id number]  Unit/Bed#: OR POOL Encounter: [id number]    Admission Date: 12/28/2023     Admitting Diagnosis: Hallux rigidus, left foot [M20.22]  Metatarsalgia, left foot [M77.42]    Discharge Diagnosis: same    Procedures Performed: 1ST MTPJ FUSION PSB PLANTAR PLATE REPAIR 2nd metatarsal: 58645 (CPT®)  WEIL OSTEOTOMY:     Complications: none    Condition at Discharge: stable    Discharge instructions/Information to patient and family:   See after visit summary for information provided to patient and family.      Provisions for Follow-Up Care/Important appointments:  See after visit summary for information related to follow-up care and any pertinent home health orders.      Discharge Medications:  See after visit summary for reconciled discharge medications provided to patient and family.

## 2023-12-28 NOTE — OP NOTE
OPERATIVE REPORT - Podiatry  PATIENT NAME: Kaitlynn Richards    :  1975  MRN: [id number]  Pt Location: [facility] OR ROOM 07    SURGERY DATE: 2023    Surgeons and Role:     * Anne Jamil DPM - Primary     * Gwyn Villa DPM - Assisting    Pre-op Diagnosis:  Hallux rigidus, left foot [M20.22]  Metatarsalgia, left foot [M77.42]    Post-Op Diagnosis Codes:     * Hallux rigidus, left foot [M20.22]     * Metatarsalgia, left foot [M77.42]    Procedure(s) (LRB):  1ST MTPJ FUSION PSB PLANTAR PLATE REPAIR 2nd metatarsal (Left)  WEIL OSTEOTOMY (Left)    Specimen(s):  * No specimens in log *    Estimated Blood Loss:   Minimal    Drains:  * No LDAs found *    Anesthesia Type:   IV Sedation with Anesthesia with 15 ml of 0.5% Bupivacaine and 1% Lidocaine in a 1:1 mixture    Hemostasis:  - Atraumatic technique   - Manual compression  - Pneumatic ankle tourniquet   - Electrocautery     Materials:  Implant Name Type Inv. Item Serial No.  Lot No. LRB No. Used Action   [employer] W[employer] 0.062 IN ORTHO TROC TIP - [id number]  AR[employer] WIRE 0.062 IN ORTHO TROC TIP  [employer]  Left 3 Implanted   SCREW COMP 3.5 X 28MM MINI FT - [id number]  SCREW COMP 3.5 X 28MM MINI FT  [employer]  Left 1 Implanted   SCREW PHILLIP 2 X 12MM QUICKFIX SML JOINT - [id number]  SCREW PHILLIP 2 X 12MM QUICKFIX SML JOINT  [employer]  Left 1 Implanted   STAPLE DYNANITE RUTH W/INSTRUS 15W X 15L - [id number]  STAPLE DYNANITE RUTH W/INSTRUS 15W X 15L  [employer]  Left 1 Implanted     Operative Findings:  Consistent with diagnosis.     Complications:   None    Procedure and Technique:     Under mild sedation, the patient was brought into the operating room and placed on the operating room table in the supine position. IV sedation was achieved by anesthesia team and a universal timeout was performed where all parties are in agreement of correct patient, correct procedure and correct site. A pneumatic tourniquet was then placed over the patient's  left lower extremity with ample padding. A Valladares and 2nd digit block was performed consisting of 15 ml of 0.5% Bupivacaine and 1% Lidocaine in a 1:1 mixture. The foot was then prepped and draped in the usual aseptic manner. An esmarch bandage was used to exsangunate the foot and the pneumatic tourniquet was then inflated to 250 mmHg.    1st MTPJ Arthrodesis  Attention was then directed to dorsomedial aspect of the Left foot where an approximately 6cm dorsolinear incision was made. This incision was deepened to subcutaneous tissue with a sterile 15 blade. All vital neurovascular structures were identified and then retracted, all bleeders were identified and cauterized. The EHL was then identified and retracted laterally. A capsular incision was then made and all capsular and periosteal structures were reflected off of the 1st metatarsal head and the base of the proximal phalanx, all soft tissue attachments were reflected off the 1st metatarsal head and base of the proximal phalanx.   Upon visualization of the MTPJ, it was noted that there was cartilage destruction to metatarsal head and prominent osteophyte formation circumferentially around the joint. A cup and cone reamer was then utilized to remove all cartilaginous surfaces from the head of the first metatarsal and the base of the proximal phalanx down to bleeding subchondral bone in the standard fashion. A Rongeur and curette was then utilized to resect the cartilage from the dorsal aspect of the metatarsal and proximal phalanx.   Adequate reduction and position of the joint was visualized and assured using C-arm. Using standard AO technique a lag screw was placed from distal medial aspect on the proximal phalanx to proximal lateral on the first metatarsal head to achieve compression. The length was confirmed on C-arm. A staple was then placed dorsally across the MTPJ.   Adequate reduction of the joint was confirmed radiologically and clinically. The wound was  cleansed with copious amounts of sterile saline. Capsular closure was then obtained utilizing 3-0 Vicryl. Subcutaneous tissues were then reapproximated utilizing 4-0 Vicryl. Skin closure was then obtained utilizing Monocryl suture in running technique.     2nd Ray Weil Osteotomy  Attention was then directed to the left second MTPJ. It was contracted as well as subluxed medially. At this time a 4 cm linear incision was made over the MTPJ. Incision was carried down through the subcutaneous tissue taking care to retract vital neurovascular structures. Upon reaching the extensor tendon the extensor tendon was freed up medially with release of extensor dent and retracted laterally. At this time a linear incision was made over the head of the metatarsal and onto the base the proximal phalanx. The capsule was carefully reflected away from the metatarsal head as well as the base of the proximal phalanx. The head of the metatarsal and the base of the proximal phalanx were inspected and there was no noted cartilage defect.     At this time utilizing a sagittal saw Weil osteotomy was performed. Next the capital fragment was gently placed in proximal position and provisionally fixated with guide pin from Arth[employer] tray. The plantar plate was examined and there were no noted tear of the plantar plate. The distractor on the tray was placed to obtain distraction as well as needed. The capital fragment was then placed in corrected position using c-arm and then stabilized using snap off screw. The toe was noted to sit in more adequate positioning and was no longer contracted, and was stable. The overhang was removed with a rongeur. Surgical site was then irrigated with copious amounts of sterile saline.    Capsular closure was then obtained utilizing 3-0 Vicryl. Subcutaneous tissues were then reapproximated utilizing 4-0 Vicryl. Skin closure was then obtained utilizing Monocryl suture in running technique. Wound was dressed with  "Steri-Strips, Xeroform, DSD, and posterior splint applied.     The tourniquet was deflated at approximately 88 min and normal hyperemic response was noted to all digits. The patient tolerated the procedure and anesthesia well without immediate complications and transferred to PACU with vital signs stable.     Dr. Jamil was present during the entire procedure and participated in all key aspects.    SIGNATURE: Gwyn Villa DPM  DATE: December 28, 2023  TIME: 4:52 PM      Portions of the record may have been created with voice recognition software. Occasional wrong word or \"sound a like\" substitutions may have occurred due to the inherent limitations of voice recognition software. Read the chart carefully and recognize, using context, where substitutions have occurred.                  "

## 2023-12-28 NOTE — DISCHARGE INSTR - AVS FIRST PAGE
Discharge Instructions - Podiatry    Weight Bearing Status: Non-weightbearing to left foot with crutches.                        Pain: Continue analgesics as directed    Follow-up appointment instructions: Please make an appointment within one week of discharge with Dr. Jamil. Contact sooner if any increase in pain, or signs of infection occur    Patient Wound Care Instructions: Leave dressings clean, dry, and intact until 1st outpatient office visit.

## 2023-12-29 DIAGNOSIS — N63.0 BREAST MASS IN FEMALE: Primary | ICD-10-CM

## 2023-12-29 DIAGNOSIS — R92.8 OTHER ABNORMAL AND INCONCLUSIVE FINDINGS ON DIAGNOSTIC IMAGING OF BREAST: ICD-10-CM

## 2024-01-05 LAB
LAB AP GYN PRIMARY INTERPRETATION: NORMAL
Lab: NORMAL
PATH INTERP SPEC-IMP: NORMAL

## 2024-02-01 ENCOUNTER — TELEPHONE (OUTPATIENT)
Age: 49
End: 2024-02-01

## 2024-02-01 NOTE — TELEPHONE ENCOUNTER
Patient called in and wanted to let Dr. Fagan know that she lost 5 pounds.     Patient would like a call back from Dr Fagan at [phone]    Thank you.

## 2024-02-02 ENCOUNTER — TELEPHONE (OUTPATIENT)
Age: 49
End: 2024-02-02

## 2024-02-02 NOTE — TELEPHONE ENCOUNTER
2nd attempt  Patient stated that she really would like a return call from MIKI Diamond to discuss weigh mgmt. The patient can be reached at [phone].

## 2024-02-21 ENCOUNTER — EVALUATION (OUTPATIENT)
Dept: PHYSICAL THERAPY | Facility: REHABILITATION | Age: 49
End: 2024-02-21
Payer: COMMERCIAL

## 2024-02-21 DIAGNOSIS — M20.22 HALLUX RIGIDUS OF LEFT FOOT: Primary | ICD-10-CM

## 2024-02-21 PROCEDURE — 97161 PT EVAL LOW COMPLEX 20 MIN: CPT

## 2024-02-21 PROCEDURE — 97140 MANUAL THERAPY 1/> REGIONS: CPT

## 2024-02-21 PROCEDURE — 97110 THERAPEUTIC EXERCISES: CPT

## 2024-02-21 NOTE — PROGRESS NOTES
PT Evaluation     Today's date: 2024  Patient name: Kaitlynn Richards  : 1975  MRN: [id number]  Referring provider: Anne Jamil DPM  Dx:   Encounter Diagnosis     ICD-10-CM    1. Hallux rigidus of left foot  M20.22           Start Time: 917  Stop Time: 1010  Total time in clinic (min): 53 minutes    Assessment  Assessment details: Kaitlynn Richards is a 48 y.o. year old female with a referred dx of Hallux rigidus of left foot. Pt is s/p 1ST MTPJ FUSION PSB PLANTAR PLATE REPAIR 2nd metatarsal (Left), WEIL OSTEOTOMY (Left) on 23 with Dr. Jamil. Pt presents with pain with functional activities such as ascending steps, prolonged standing/walking, and gym activities. Upon further clinical testing, pt demonstrates decreased L ankle strength, limited DF and great toe ROM, and poor balance compared to RLE. Pt would benefit from skilled OP PT to address these, and the below impairments in order to optimize outcomes and promote return to functional baseline.     Pt able to demonstrate HEP with good technique and no pain. Educated pt to stop any exercises causing pain increase, pt verbalizes understanding.       Impairments: abnormal gait, abnormal or restricted ROM, abnormal movement, activity intolerance, impaired balance, impaired physical strength, lacks appropriate home exercise program, pain with function, weight-bearing intolerance, poor posture  and poor body mechanics    Goals    Short Term Goals:  In 4 weeks, the patient will:  1. Decrease worst pain by 2 points for improved QOL.  2. Improve L ankle strength by 1/2 grade for improved LE fx.  3. Supervision with HEP for self care.    Long Term Goals:  In 8 weeks, the patient will:  1. Improve L ankle strength to 5/5 for improved LE fx.  2. FOTO to greater than predicted value.  3. Independent with comprehensive HEP upon discharge.  4. Improve SLS balance to >15 seconds for improved static balance and ankle joint stability.  5. Ascend steps with  "reciprocal pattern for return to PLOF.        Plan  Patient would benefit from: skilled speech therapy  Referral necessary: No  Planned modality interventions: cryotherapy  Planned therapy interventions: activity modification, ADL retraining, joint mobilization, manual therapy, nerve gliding, patient education, postural training, strengthening, stretching, therapeutic activities, therapeutic exercise, home exercise program, graded exercise, functional ROM exercises, flexibility, gait training, body mechanics training, balance and balance/weight bearing training  Frequency: 2x week  Duration in weeks: 8  Plan of Care beginning date: 2024  Plan of Care expiration date: 2024  Treatment plan discussed with: patient      Subjective Evaluation    History of Present Illness  Mechanism of injury: Pt is s/p 1ST MTPJ FUSION PSB PLANTAR PLATE REPAIR 2nd metatarsal (Left), WEIL OSTEOTOMY (Left) on 23 with Dr. Jamil. Pt report \"ache\" pain across top of forefoot. Pt had appointment with podiatry yesterday, provided with script for PT, does not return for 1 month. Per pt, she was NWB on crutches for approx 1 month, then transitioned to CAM boot, and 2 weeks ago switched to sneakers. Pain worse with ascending steps, walking longer distances after approx 1 hour. Pt attends gym 5x/week to use cardio machines, was cleared 2 weeks ago to return. She also walks long distances for exercise. Pain relief with rest. Denies numbness/tingling or pain with sleep. Pt reports she ascends steps with step-to pattern, only ascending with RLE. No longer ambulating with AD.   Patient Goals  Patient goals for therapy: decreased pain, increased strength, independence with ADLs/IADLs and return to sport/leisure activities (back to full gym routine)    Pain  Current pain rating: 3  At best pain ratin  At worst pain ratin    Social Support  Stairs in house: yes (1 flight)   Lives in: multiple-level home    Employment status: not " "working (on disability)      Objective  OBJECTIVE:    Standing Posture & Lower Extremity Alignment:  Structure/Joint         Knee - Frontal   Genuvalgum x Neutral  Genuvarum   Knee - Sagittal  Genurecurvatum x Neutral     Rearfoot  Valgus x Neutral  Varus   Forefoot  Abducted x Neutral     Arch  Pes Planus x Neutral  Pes Cavus     Range of Motion: Goniometric measurements revealed the following findings (in degrees).  Joint Motion Right: 2/21/2024 Left: 2/21/2024   Ankle Dorsiflexion 10 3   Ankle Plantarflexion WFL WFL   Ankle Inversion WFL WFL   Ankle Eversion WFL WFL   Great toe flexion 45 10*   Great toe ext 60 20*     Strength: MMT revealed the following findings.  Joint Motion Right: 2/21/2024 Left: 2/21/2024   Ankle Plantarflexion 5/5 3/5   Ankle Dorsiflexion 5/5 4/5   Ankle Inversion 5/5 4-/5   Ankle Eversion 5/5 4-/5   *=indicates pain with testing    Additional Assessments:  Palpation: TTP surrounding incisions  Observation: minor swelling present between incisions, incisions healing well  Gait Pattern: decreased stance time on LLE   Balance: 30 sec SLS on R, 3 sec on L  Squat: good technique, notes pain  Joint mobility: hypomobile TC joint into PF/DF    Lower quarter screen: unremarkable     Special Tests: NT secondary to post-op       POC expires Unit limit Auth Expiration date PT/OT + Visit Limit?   4/17/24 4 units N/a BOMN     HEP access code: [email or web address]  Pertinent Findings:      Test / Measure  2/21/2024   L ankle PF MMT 3/5   DF ROM 3 deg     Precautions: s/p 1ST MTPJ FUSION PSB PLANTAR PLATE REPAIR 2nd metatarsal (Left), WEIL OSTEOTOMY (Left) on 12/28/23    VISIT 1    Manuals 2/21    L ankle PROM CM 4' DF    A-P TC mobs  CM 4' Gr 3-4              Neuro Re-Ed     Fwd leans     Foot dome 5\"x10    SLS     Biodex balance     Towel scrunches      BAPS board          Ther Ex     TM walk     Toe AROM flex/ext X20     Tband ankle 4-way X10 ea gtb    Gastroc stretch 30\"x2 stand hep    HR/TR X20 ea hep Off foam "   HR on leg press               Ther Activity     Side step on foam     Tandem walking               Gait Training               Modalities

## 2024-02-21 NOTE — LETTER
2024    Anne Jamil DPM  [address] [place] [address]    Patient: Kaitlynn Richards   YOB: 1975   Date of Visit: 2024     Encounter Diagnosis     ICD-10-CM    1. Hallux rigidus of left foot  M20.22           Dear Dr. Jamil:    Thank you for your recent referral of Kaitlynn Richards. Please review the attached evaluation summary from Kaitlynn's recent visit.     Please verify that you agree with the plan of care by signing the attached order.     If you have any questions or concerns, please do not hesitate to call.     I sincerely appreciate the opportunity to share in the care of one of your patients and hope to have another opportunity to work with you in the near future.       Sincerely,    Mendez Mcneal, PT      Referring Provider:      I certify that I have read the below Plan of Care and certify the need for these services furnished under this plan of treatment while under my care.                    Anne Jamil DPM  [address] [place] [address]  Via Fax: [phone]          PT Evaluation     Today's date: 2024  Patient name: Kaitlynn Richards  : 1975  MRN: [id number]  Referring provider: Anne Jamil DPM  Dx:   Encounter Diagnosis     ICD-10-CM    1. Hallux rigidus of left foot  M20.22           Start Time: 917  Stop Time: 1010  Total time in clinic (min): 53 minutes    Assessment  Assessment details: Kaitlynn Richards is a 48 y.o. year old female with a referred dx of Hallux rigidus of left foot. Pt is s/p 1ST MTPJ FUSION PSB PLANTAR PLATE REPAIR 2nd metatarsal (Left), WEIL OSTEOTOMY (Left) on 23 with Dr. Jamil. Pt presents with pain with functional activities such as ascending steps, prolonged standing/walking, and gym activities. Upon further clinical testing, pt demonstrates decreased L ankle strength, limited DF and great toe ROM, and poor balance compared to RLE. Pt would benefit from skilled OP PT to address these, and the below  impairments in order to optimize outcomes and promote return to functional baseline.     Pt able to demonstrate HEP with good technique and no pain. Educated pt to stop any exercises causing pain increase, pt verbalizes understanding.       Impairments: abnormal gait, abnormal or restricted ROM, abnormal movement, activity intolerance, impaired balance, impaired physical strength, lacks appropriate home exercise program, pain with function, weight-bearing intolerance, poor posture  and poor body mechanics    Goals    Short Term Goals:  In 4 weeks, the patient will:  1. Decrease worst pain by 2 points for improved QOL.  2. Improve L ankle strength by 1/2 grade for improved LE fx.  3. Supervision with HEP for self care.    Long Term Goals:  In 8 weeks, the patient will:  1. Improve L ankle strength to 5/5 for improved LE fx.  2. FOTO to greater than predicted value.  3. Independent with comprehensive HEP upon discharge.  4. Improve SLS balance to >15 seconds for improved static balance and ankle joint stability.  5. Ascend steps with reciprocal pattern for return to PLOF.        Plan  Patient would benefit from: skilled speech therapy  Referral necessary: No  Planned modality interventions: cryotherapy  Planned therapy interventions: activity modification, ADL retraining, joint mobilization, manual therapy, nerve gliding, patient education, postural training, strengthening, stretching, therapeutic activities, therapeutic exercise, home exercise program, graded exercise, functional ROM exercises, flexibility, gait training, body mechanics training, balance and balance/weight bearing training  Frequency: 2x week  Duration in weeks: 8  Plan of Care beginning date: 2/21/2024  Plan of Care expiration date: 4/17/2024  Treatment plan discussed with: patient      Subjective Evaluation    History of Present Illness  Mechanism of injury: Pt is s/p 1ST MTPJ FUSION PSB PLANTAR PLATE REPAIR 2nd metatarsal (Left), WEIL OSTEOTOMY  "(Left) on 23 with Dr. Jamil. Pt report \"ache\" pain across top of forefoot. Pt had appointment with podiatry yesterday, provided with script for PT, does not return for 1 month. Per pt, she was NWB on crutches for approx 1 month, then transitioned to CAM boot, and 2 weeks ago switched to sneakers. Pain worse with ascending steps, walking longer distances after approx 1 hour. Pt attends gym 5x/week to use cardio machines, was cleared 2 weeks ago to return. She also walks long distances for exercise. Pain relief with rest. Denies numbness/tingling or pain with sleep. Pt reports she ascends steps with step-to pattern, only ascending with RLE. No longer ambulating with AD.   Patient Goals  Patient goals for therapy: decreased pain, increased strength, independence with ADLs/IADLs and return to sport/leisure activities (back to full gym routine)    Pain  Current pain rating: 3  At best pain ratin  At worst pain ratin    Social Support  Stairs in house: yes (1 flight)   Lives in: multiple-level home    Employment status: not working (on disability)      Objective  OBJECTIVE:    Standing Posture & Lower Extremity Alignment:  Structure/Joint         Knee - Frontal   Genuvalgum x Neutral  Genuvarum   Knee - Sagittal  Genurecurvatum x Neutral     Rearfoot  Valgus x Neutral  Varus   Forefoot  Abducted x Neutral     Arch  Pes Planus x Neutral  Pes Cavus     Range of Motion: Goniometric measurements revealed the following findings (in degrees).  Joint Motion Right: 2024 Left: 2024   Ankle Dorsiflexion 10 3   Ankle Plantarflexion WFL WFL   Ankle Inversion WFL WFL   Ankle Eversion WFL WFL   Great toe flexion 45 10*   Great toe ext 60 20*     Strength: MMT revealed the following findings.  Joint Motion Right: 2024 Left: 2024   Ankle Plantarflexion 5/5 3/5   Ankle Dorsiflexion 5/5 4/5   Ankle Inversion 5/5 4-/5   Ankle Eversion 5/5 4-/5   *=indicates pain with testing    Additional " "Assessments:  Palpation: TTP surrounding incisions  Observation: minor swelling present between incisions, incisions healing well  Gait Pattern: decreased stance time on LLE   Balance: 30 sec SLS on R, 3 sec on L  Squat: good technique, notes pain  Joint mobility: hypomobile TC joint into PF/DF    Lower quarter screen: unremarkable     Special Tests: NT secondary to post-op       POC expires Unit limit Auth Expiration date PT/OT + Visit Limit?   4/17/24 4 units N/a BOMN     HEP access code: [email or web address]  Pertinent Findings:      Test / Measure  2/21/2024   L ankle PF MMT 3/5   DF ROM 3 deg     Precautions: s/p 1ST MTPJ FUSION PSB PLANTAR PLATE REPAIR 2nd metatarsal (Left), WEIL OSTEOTOMY (Left) on 12/28/23    VISIT 1    Manuals 2/21    L ankle PROM CM 4' DF    A-P TC mobs  CM 4' Gr 3-4              Neuro Re-Ed     Fwd leans     Foot dome 5\"x10    SLS     Biodex balance     Towel scrunches      BAPS board          Ther Ex     TM walk     Toe AROM flex/ext X20     Tband ankle 4-way X10 ea gtb    Gastroc stretch 30\"x2 stand hep    HR/TR X20 ea hep Off foam   HR on leg press               Ther Activity     Side step on foam     Tandem walking               Gait Training               Modalities                                    "

## 2024-02-23 ENCOUNTER — OFFICE VISIT (OUTPATIENT)
Dept: PHYSICAL THERAPY | Facility: REHABILITATION | Age: 49
End: 2024-02-23
Payer: COMMERCIAL

## 2024-02-23 DIAGNOSIS — M20.22 HALLUX RIGIDUS OF LEFT FOOT: Primary | ICD-10-CM

## 2024-02-23 PROCEDURE — 97110 THERAPEUTIC EXERCISES: CPT

## 2024-02-23 PROCEDURE — 97140 MANUAL THERAPY 1/> REGIONS: CPT

## 2024-02-23 NOTE — PROGRESS NOTES
"Daily Note     Today's date: 2024  Patient name: Kaitlynn Richards  : 1975  MRN: [id number]  Referring provider: Jocelin Massey PA-C  Dx:   Encounter Diagnosis     ICD-10-CM    1. Hallux rigidus of left foot  M20.22           Start Time: 0803  Stop Time: 0851  Total time in clinic (min): 48 minutes    Subjective: Pt denies any pain upon arrival today. She notes some stiffness in her L foot but no pain.       Objective: See treatment diary below      Assessment: Tolerated treatment well. Pt was able to perform all Brynn with Vcs for expectations post surgery. She was educated on ROM limitation and awareness of compensation when performing exercises in/out of clinic. Patient was educated on HEP including safety precautions in regards to footwear to decrease risk of further injury. Patient demonstrated fatigue post treatment, exhibited good technique with therapeutic exercises, and would benefit from continued PT      Plan: Continue per plan of care.  Progress treament per protocol.      POC expires Unit limit Auth Expiration date PT/OT + Visit Limit?   24 4 units N/a BOMN     HEP access code: [email or web address]  Pertinent Findings:      Test / Measure  2024   L ankle PF MMT 3/5   DF ROM 3 deg     Precautions: s/p 1ST MTPJ FUSION PSB PLANTAR PLATE REPAIR 2nd metatarsal (Left), WEIL OSTEOTOMY (Left) on 23    VISIT 1 2   Manuals    L ankle PROM CM 4' DF SA 4' DF   A-P TC mobs  CM 4' Gr 3-4              Neuro Re-Ed     Fwd leans     Foot dome 5\"x10 5\" 10x   SLS  5\" 10x   Biodex balance     Towel scrunches   5\" 10x   BAPS board  1x10 FWD/BWD/SIDE/SIDE/CC/  CCW        Ther Ex     TM walk  2.0 6'   Toe AROM flex/ext X20  x20   Tband ankle 4-way X10 ea gtb X10 ea GTB    Gastroc stretch 30\"x2 stand hep 30\" x2 stand    HR/TR X20 ea hep Off foam 2x10   HR on leg press               Ther Activity     Side step on foam     Tandem walking               Gait Training               Modalities                  "

## 2024-02-28 ENCOUNTER — OFFICE VISIT (OUTPATIENT)
Dept: PHYSICAL THERAPY | Facility: REHABILITATION | Age: 49
End: 2024-02-28
Payer: COMMERCIAL

## 2024-02-28 DIAGNOSIS — M20.22 HALLUX RIGIDUS OF LEFT FOOT: Primary | ICD-10-CM

## 2024-02-28 PROCEDURE — 97110 THERAPEUTIC EXERCISES: CPT

## 2024-02-28 PROCEDURE — 97112 NEUROMUSCULAR REEDUCATION: CPT

## 2024-02-28 PROCEDURE — 97140 MANUAL THERAPY 1/> REGIONS: CPT

## 2024-02-28 NOTE — PROGRESS NOTES
"Daily Note     Today's date: 2024  Patient name: Kaitlynn Richards  : 1975  MRN: [id number]  Referring provider: Jocelin Massey PA-C  Dx:   Encounter Diagnosis     ICD-10-CM    1. Hallux rigidus of left foot  M20.22             Start Time: 1615  Stop Time: 1700  Total time in clinic (min): 45 minutes    Subjective: Patient reports no new complaints or major changes since last session. Patient notes utilizing the treadmill at her gym today and was able to tolerate 15 minutes of walking before L foot became symptomatic.      Objective: See treatment diary below      Assessment: Tolerated treatment well. Patient did not experience any worsening of L foot/ankle symptoms throughout any exercises performed during today's session. Patient required verbal and visual cueing for proper form and recall of exercises, with good carry over noted. Patient was able to self correct any episodes of compensations that occurred at L knee during BAPS exercises and ankle tband exercise. Improvements displayed throughout SLS while bearing weight on L LE and patient was able to tolerate an increased duration of balance without any significant episodes of instability or LOB throughout. Patient demonstrated fatigue post treatment, exhibited good technique with therapeutic exercises, and would benefit from continued PT      Plan: Continue per plan of care.  Progress treament per protocol.      POC expires Unit limit Auth Expiration date PT/OT + Visit Limit?   24 4 units N/a BOMN     HEP access code: [email or web address]  Pertinent Findings:      Test / Measure  2024   L ankle PF MMT 3/5   DF ROM 3 deg     Precautions: s/p 1ST MTPJ FUSION PSB PLANTAR PLATE REPAIR 2nd metatarsal (Left), WEIL OSTEOTOMY (Left) on 23    VISIT 1 2 3   Manuals    L ankle PROM CM 4' DF SA 4' DF KR 4' DF   A-P TC mobs  CM 4' Gr 3-4                 Neuro Re-Ed      Fwd leans      Foot dome 5\"x10 5\" 10x 5\"x10   SLS  5\" 10x 5\"x10   Biodex " "balance      Towel scrunches   5\" 10x 5\"x10   BAPS board  1x10 FWD/BWD/SIDE/SIDE/CC/  CCW 1x10 fwd/bwd, side/side, CC/CCW         Ther Ex      TM walk  2.0 6' 2.5 6'   Toe AROM flex/ext X20  x20 x20   Tband ankle 4-way X10 ea gtb X10 ea GTB  X10 ea GTB    Gastroc stretch 30\"x2 stand hep 30\" x2 stand  30\" x2 stand    HR/TR X20 ea hep Off foam 2x10 2x10   HR on leg press                  Ther Activity      Side step on foam      Tandem walking                  Gait Training                  Modalities                       "

## 2024-03-01 ENCOUNTER — OFFICE VISIT (OUTPATIENT)
Dept: PHYSICAL THERAPY | Facility: REHABILITATION | Age: 49
End: 2024-03-01
Payer: COMMERCIAL

## 2024-03-01 DIAGNOSIS — M20.22 HALLUX RIGIDUS OF LEFT FOOT: Primary | ICD-10-CM

## 2024-03-01 PROCEDURE — 97112 NEUROMUSCULAR REEDUCATION: CPT

## 2024-03-01 PROCEDURE — 97140 MANUAL THERAPY 1/> REGIONS: CPT

## 2024-03-01 PROCEDURE — 97110 THERAPEUTIC EXERCISES: CPT

## 2024-03-01 NOTE — PROGRESS NOTES
"Daily Note     Today's date: 3/1/2024  Patient name: Kaitlynn Richards  : 1975  MRN: [id number]  Referring provider: Jocelin Massey PA-C  Dx:   Encounter Diagnosis     ICD-10-CM    1. Hallux rigidus of left foot  M20.22           Start Time: 1300  Stop Time: 1338  Total time in clinic (min): 38 minutes    Subjective: Pt reports slight improvement of symptoms regarding L foot and ankle.  States that she still has apprehension and anxiety with returning to PLOF.       Objective: See treatment diary below      Assessment: Tolerated treatment well. Patient would benefit from continued PT.  Pt able to tolerate slight progression of POC this visit to address functional limitations.  Discussed prognosis following surgery.  Remains apprehensive to perform new movements.  Exercises performed without shoes.  1:1 with Travis Israel DPT entirety of tx.        Plan: Continue per plan of care.      POC expires Unit limit Auth Expiration date PT/OT + Visit Limit?   24 4 units N/a BOMN     HEP access code: [email or web address]  Pertinent Findings:      Test / Measure  2024   L ankle PF MMT 3/5   DF ROM 3 deg     Precautions: s/p 1ST MTPJ FUSION PSB PLANTAR PLATE REPAIR 2nd metatarsal (Left), WEIL OSTEOTOMY (Left) on 23    VISIT 1 2 3 4   Manuals  3/1   L ankle PROM CM 4' DF SA 4' DF KR 4' DF MM 4'   A-P TC mobs  CM 4' Gr 3-4   MM 4' Gr 3-4                 Neuro Re-Ed       Fwd leans    15x3\"   Foot dome 5\"x10 5\" 10x 5\"x10    SLS  5\" 10x 5\"x10    Biodex balance       Towel scrunches   5\" 10x 5\"x10    BAPS board  1x10 FWD/BWD/SIDE/SIDE/CC/  CCW 1x10 fwd/bwd, side/side, CC/CCW 15x ea 4 way stand   Toe curls + spreads    10x3\" ea   Ther Ex       TM walk  2.0 6' 2.5 6' 2.5 6'   Toe AROM flex/ext X20  x20 x20    Tband ankle 4-way X10 ea gtb X10 ea GTB  X10 ea GTB  15x ea btb   Gastroc stretch 30\"x2 stand hep 30\" x2 stand  30\" x2 stand  3x30\" prostretch   HR/TR X20 ea hep Off foam 2x10 2x10 2x10 off foam   HR on leg " Pt requesting refill for SPIRIVA, he would like a 90 day supple.     Last OV - 9/28/22    Please advise.      press                     Ther Activity       Side step on foam beam    5 laps ea   Tandem walking    5 laps foam beam                 Gait Training                     Modalities

## 2024-03-06 ENCOUNTER — OFFICE VISIT (OUTPATIENT)
Dept: PHYSICAL THERAPY | Facility: REHABILITATION | Age: 49
End: 2024-03-06
Payer: COMMERCIAL

## 2024-03-06 DIAGNOSIS — M20.22 HALLUX RIGIDUS OF LEFT FOOT: Primary | ICD-10-CM

## 2024-03-06 PROCEDURE — 97112 NEUROMUSCULAR REEDUCATION: CPT

## 2024-03-06 PROCEDURE — 97530 THERAPEUTIC ACTIVITIES: CPT

## 2024-03-06 PROCEDURE — 97110 THERAPEUTIC EXERCISES: CPT

## 2024-03-06 NOTE — PROGRESS NOTES
Daily Note     Today's date: 3/6/2024  Patient name: Kaitlynn Richards  : 1975  MRN: [id number]  Referring provider: Jocelin Massey PA-C  Dx:   Encounter Diagnosis     ICD-10-CM    1. Hallux rigidus of left foot  M20.22             Start Time: 1523  Stop Time: 1620  Total time in clinic (min): 57 minutes    Subjective: Patient reports that she has been trying to push herself more while at the gym and notes that she utilized the leg press machine for the first time in a while at thew gym and did not have any onset of pain after doing 2 sets of 10 reps at 20 lbs. Patient otherwise notes no new complaints and did not experience any significant L foot soreness after last session.      Objective: See treatment diary below      Assessment: Tolerated treatment well. Patient did not experience any onset of L foot/ankle symptoms throughout any exercises performed during today's session. Patient displayed improvements in tolerance to manual DF stretching/PROM of L ankle. Patient was moderately challenged with recently added tandem walking on foam beam due to instability ambulating on an uneven surface, but did not experience any significant episodes of LOB throughout duration of exercise. Trial HR leg press exercise NV since this will go hand and hand with patient's new gym routine. Continue to progress patient as able. Patient demonstrated fatigue post treatment, exhibited good technique with therapeutic exercises, and would benefit from continued PT.        Plan: Continue per plan of care.      POC expires Unit limit Auth Expiration date PT/OT + Visit Limit?   24 4 units N/a BOMN     HEP access code: [email or web address]  Pertinent Findings:      Test / Measure  2024   L ankle PF MMT 3/5   DF ROM 3 deg     Precautions: s/p 1ST MTPJ FUSION PSB PLANTAR PLATE REPAIR 2nd metatarsal (Left), WEIL OSTEOTOMY (Left) on 23    VISIT 1 2 3 4 5   Manuals  3/ 3/6   L ankle PROM CM 4' DF SA 4' DF KR 4' DF MM 4' KR  "5'   A-P TC mobs  CM 4' Gr 3-4   MM 4' Gr 3-4                    Neuro Re-Ed        Fwd leans    15x3\"    Foot dome 5\"x10 5\" 10x 5\"x10     SLS  5\" 10x 5\"x10     Biodex balance        Towel scrunches   5\" 10x 5\"x10     BAPS board  1x10 FWD/BWD/SIDE/SIDE/CC/  CCW 1x10 fwd/bwd, side/side, CC/CCW 15x ea 4 way stand 15x ea 4 way stand   Toe curls + spreads    10x3\" ea 10x3\" ea   Ther Ex        TM walk  2.0 6' 2.5 6' 2.5 6' 2.5 8'   Toe AROM flex/ext X20  x20 x20     Tband ankle 4-way X10 ea gtb X10 ea GTB  X10 ea GTB  15x ea btb 15x ea btb   Gastroc stretch 30\"x2 stand hep 30\" x2 stand  30\" x2 stand  3x30\" prostretch 3x30\" prostretch   HR/TR X20 ea hep Off foam 2x10 2x10 2x10 off foam 2x10 off foam   HR on leg press     NV                   Ther Activity        Side step on foam beam    5 laps ea 5 laps   Tandem walking    5 laps foam beam 5 laps foam beam                   Gait Training                        Modalities                               "

## 2024-03-07 ENCOUNTER — OFFICE VISIT (OUTPATIENT)
Dept: FAMILY MEDICINE CLINIC | Facility: CLINIC | Age: 49
End: 2024-03-07
Payer: COMMERCIAL

## 2024-03-07 DIAGNOSIS — M20.12 HALLUX VALGUS OF LEFT FOOT: ICD-10-CM

## 2024-03-07 PROCEDURE — 99212 OFFICE O/P EST SF 10 MIN: CPT

## 2024-03-07 NOTE — ASSESSMENT & PLAN NOTE
Current weight 153lbs   Gradually improving with exercise tolerance   Encouraged gradual increase of exercises

## 2024-03-07 NOTE — PROGRESS NOTES
Name: Kaitlynn Richards      : 1975      MRN: [id number]  Encounter Provider: MIKI Diamond  Encounter Date: 3/7/2024   Encounter department: [facility]    Assessment & Plan     1. BMI 26.0-26.9,adult  Assessment & Plan:  Current weight 153lbs   Gradually improving with exercise tolerance   Encouraged gradual increase of exercises       2. Hallux valgus of left foot  Assessment & Plan:  24 Surgery   1st MTPJ fusion   PSB plantar plate repair (Second metatarsal L)   Weil osteotomy              Subjective      Presents to the office today for change in level of activity. Recently had a bunion removed from the left great toe on . Patient has been having difficulty not being as active as she has previously been related to limitations of surgery.       Review of Systems   Constitutional:  Negative for activity change, chills, fatigue and fever.   HENT:  Negative for congestion, ear pain, rhinorrhea, sore throat and trouble swallowing.    Eyes:  Negative for pain and visual disturbance.   Respiratory:  Negative for cough, chest tightness and shortness of breath.    Cardiovascular:  Negative for chest pain, palpitations and leg swelling.   Gastrointestinal:  Negative for abdominal pain, constipation, diarrhea, nausea and vomiting.   Genitourinary:  Negative for difficulty urinating, dysuria, hematuria and urgency.   Musculoskeletal:  Negative for arthralgias and back pain.   Skin:  Negative for color change and rash.   Neurological:  Negative for dizziness, seizures, syncope and headaches.   Psychiatric/Behavioral:  Negative for dysphoric mood. The patient is not nervous/anxious.    All other systems reviewed and are negative.      Current Outpatient Medications on File Prior to Visit   Medication Sig    Aspirin-Acetaminophen-Caffeine (EXCEDRIN PO) Take by mouth if needed    calcium carbonate (OS-AUGIE) 600 MG tablet every 24 hours    cetirizine (ZyrTEC) 10 mg tablet  TAKE 1 TABLET BY MOUTH EVERY DAY    Cholecalciferol 5000 units TABS take 1 capsule by oral route  every day start after completing weekly dose    cyclobenzaprine (FLEXERIL) 10 mg tablet Take 1 tablet (10 mg total) by mouth 2 (two) times a day as needed for muscle spasms    fexofenadine (ALLEGRA) 60 MG tablet Take 1 tablet (60 mg total) by mouth daily    ibuprofen (MOTRIN) 400 mg tablet Take 1 tablet (400 mg total) by mouth every 6 (six) hours as needed for mild pain (Body aches or fever) for up to 7 days    ibuprofen (MOTRIN) 600 mg tablet Take 1 tablet (600 mg total) by mouth every 6 (six) hours as needed for mild pain    levothyroxine 50 mcg tablet Take 1 tablet (50 mcg total) by mouth daily    loratadine (CLARITIN) 10 mg tablet TAKE 1 TABLET BY MOUTH EVERY DAY (Patient not taking: Reported on 12/19/2023)    meloxicam (MOBIC) 15 mg tablet Take 15 mg by mouth daily    methocarbamol (ROBAXIN) 750 mg tablet Take 750 mg by mouth every 6 (six) hours as needed    norethindrone-ethinyl estradiol (Junel FE 1/20) 1-20 MG-MCG per tablet Take 1 tablet by mouth daily Take 1 active tablet for 84 days then the inactive for 7 days    omeprazole (PriLOSEC) 40 MG capsule Take 1 capsule (40 mg total) by mouth daily (Patient taking differently: Take 40 mg by mouth every evening)    SUMAtriptan (IMITREX) 50 mg tablet Take 1 tablet (50 mg total) by mouth once as needed for migraine for up to 30 doses       Objective     There were no vitals taken for this visit.    Physical Exam  I have spent a total time of 20 minutes on 03/07/24 in caring for this patient including Diagnostic results, Prognosis, Risks and benefits of tx options, Instructions for management, Patient and family education, Importance of tx compliance, Risk factor reductions, Impressions, Counseling / Coordination of care, Documenting in the medical record, Reviewing / ordering tests, medicine, procedures  , Obtaining or reviewing history  , and Communicating with other  healthcare professionals .     MIKI Diamond

## 2024-03-07 NOTE — ASSESSMENT & PLAN NOTE
12/28/24 Surgery   1st MTPJ fusion   PSB plantar plate repair (Second metatarsal L)   Weil osteotomy

## 2024-03-08 ENCOUNTER — OFFICE VISIT (OUTPATIENT)
Dept: PHYSICAL THERAPY | Facility: REHABILITATION | Age: 49
End: 2024-03-08
Payer: COMMERCIAL

## 2024-03-08 DIAGNOSIS — M20.22 HALLUX RIGIDUS OF LEFT FOOT: Primary | ICD-10-CM

## 2024-03-08 PROCEDURE — 97110 THERAPEUTIC EXERCISES: CPT

## 2024-03-08 PROCEDURE — 97530 THERAPEUTIC ACTIVITIES: CPT

## 2024-03-08 NOTE — PROGRESS NOTES
"Daily Note     Today's date: 3/8/2024  Patient name: Kaitlynn Richards  : 1975  MRN: [id number]  Referring provider: Jocelin Massey PA-C  Dx:   Encounter Diagnosis     ICD-10-CM    1. Hallux rigidus of left foot  M20.22               Start Time: 1338  Stop Time: 1410  Total time in clinic (min): 32 minutes    Subjective: Patient reports doing well overall, has been slowly resuming gym routine.       Objective: See treatment diary below      Assessment: Tolerated treatment well. Demonstrating good L ankle ROM, held on PROM today as a result. Continues to progress functional strength, intrinsic motor control, and dynamic balance well. Fatigued throughout tx by gastroc loading. Pt would continue to benefit from skilled PT. 1:1 with Mendez Mcneal DPT from [id number], IEP for remainder.       Plan: Continue per plan of care.      POC expires Unit limit Auth Expiration date PT/OT + Visit Limit?   24 4 units N/a BOMN     HEP access code: [email or web address]  Pertinent Findings:      Test / Measure  2024   L ankle PF MMT 3/5   DF ROM 3 deg     Precautions: s/p 1ST MTPJ FUSION PSB PLANTAR PLATE REPAIR 2nd metatarsal (Left), WEIL OSTEOTOMY (Left) on 23    VISIT 1 2 3 4 5 6   Manuals 2/21 2/23 2/28 3/1 3/6 3/8   L ankle PROM CM 4' DF SA 4' DF KR 4' DF MM 4' KR 5'    A-P TC mobs  CM 4' Gr 3-4   MM 4' Gr 3-4                       Neuro Re-Ed         Fwd leans    15x3\"     Foot dome 5\"x10 5\" 10x 5\"x10      SLS  5\" 10x 5\"x10      Biodex balance         Towel scrunches   5\" 10x 5\"x10      BAPS board  1x10 FWD/BWD/SIDE/SIDE/CC/  CCW 1x10 fwd/bwd, side/side, CC/CCW 15x ea 4 way stand 15x ea 4 way stand    Toe curls + spreads    10x3\" ea 10x3\" ea 3\" 2x10 ea   Ther Ex         TM walk  2.0 6' 2.5 6' 2.5 6' 2.5 8' 2.5 8'   Toe AROM flex/ext X20  x20 x20      Tband ankle 4-way X10 ea gtb X10 ea GTB  X10 ea GTB  15x ea btb 15x ea btb    Gastroc stretch 30\"x2 stand hep 30\" x2 stand  30\" x2 stand  3x30\" prostretch 3x30\" " "prostretch 30\"x3 off 4\" step    HR/TR X20 ea hep Off foam 2x10 2x10 2x10 off foam 2x10 off foam 3x10 off foam   HR on leg press     NV    Leg press      3x10 100#            Ther Activity         Side step on foam beam    5 laps ea 5 laps 2x4 laps on forefoot   Tandem walking    5 laps foam beam 5 laps foam beam 4 laps on foam                     Gait Training                           Modalities                                  "

## 2024-03-13 ENCOUNTER — OFFICE VISIT (OUTPATIENT)
Dept: PHYSICAL THERAPY | Facility: REHABILITATION | Age: 49
End: 2024-03-13
Payer: COMMERCIAL

## 2024-03-13 DIAGNOSIS — M20.22 HALLUX RIGIDUS OF LEFT FOOT: Primary | ICD-10-CM

## 2024-03-13 PROCEDURE — 97112 NEUROMUSCULAR REEDUCATION: CPT

## 2024-03-13 PROCEDURE — 97110 THERAPEUTIC EXERCISES: CPT

## 2024-03-13 NOTE — PROGRESS NOTES
"Daily Note     Today's date: 3/13/2024  Patient name: Kaitlynn Richards  : 1975  MRN: [id number]  Referring provider: Jocelin Massey PA-C  Dx:   Encounter Diagnosis     ICD-10-CM    1. Hallux rigidus of left foot  M20.22               Start Time: 1357  Stop Time: 1446  Total time in clinic (min): 49 minutes    Subjective: Patient reports she tried eliptical at the gym for 15 minutes, no pain with this. Pt reports she wants to resume her walking routine, but has been cautious how quickly she returns to this.        Objective: See treatment diary below      Assessment: Tolerated treatment well. Progressing intrinsic motor control well. Continues to fatigue easily from gastroc loading. Fatigue post-tx, no pain throughout. Educated pt she may begin to increase walking distance to resume prior program due to good tolerance and no pain with activity. Pt would continue to benefit from skilled PT. 1:1 with Mendez Mcneal DPT for entirety of tx.       Plan: Continue per plan of care.      POC expires Unit limit Auth Expiration date PT/OT + Visit Limit?   24 4 units N/a BOMN     HEP access code: [email or web address]  Pertinent Findings:      Test / Measure  2024   L ankle PF MMT 3/5   DF ROM 3 deg     Precautions: s/p 1ST MTPJ FUSION PSB PLANTAR PLATE REPAIR 2nd metatarsal (Left), WEIL OSTEOTOMY (Left) on 23    VISIT 1 2 3 4 5 6 7   Manuals  3 3/ 3 313   L ankle PROM CM 4' DF SA 4' DF KR 4' DF MM 4' KR 5'     A-P TC mobs  CM 4' Gr 3-4   MM 4' Gr 3-4                          Neuro Re-Ed          Fwd leans    15x3\"      Foot dome 5\"x10 5\" 10x 5\"x10       SLS  5\" 10x 5\"x10       Biodex balance          Towel scrunches   5\" 10x 5\"x10       BAPS board  1x10 FWD/BWD/SIDE/SIDE/CC/  CCW 1x10 fwd/bwd, side/side, CC/CCW 15x ea 4 way stand 15x ea 4 way stand     Toe curls + spreads    10x3\" ea 10x3\" ea 3\" 2x10 ea 2x15 spread only   Ther Ex          TM walk  2.0 6' 2.5 6' 2.5 6' 2.5 8' 2.5 8' 2.5 8'   Toe " "AROM flex/ext X20  x20 x20       Tband ankle 4-way X10 ea gtb X10 ea GTB  X10 ea GTB  15x ea btb 15x ea btb     Gastroc stretch 30\"x2 stand hep 30\" x2 stand  30\" x2 stand  3x30\" prostretch 3x30\" prostretch 30\"x3 off 4\" step  30\"x3 off 4\" step    HR/TR X20 ea hep Off foam 2x10 2x10 2x10 off foam 2x10 off foam 3x10 off foam 3x10 TR off foam w/ toe curl    2x10 HR off foam BLE   HR on leg press     NV     Leg press      3x10 100# 3x10 115#   Bridge +foot dome       Nv--> ?   Ther Activity          Side step on foam beam    5 laps ea 5 laps 2x4 laps on forefoot [id number] on forefoot   Tandem walking    5 laps foam beam 5 laps foam beam 4 laps on foam 2x4 laps on foam                       Gait Training                              Modalities                                     "

## 2024-03-15 ENCOUNTER — OFFICE VISIT (OUTPATIENT)
Dept: PHYSICAL THERAPY | Facility: REHABILITATION | Age: 49
End: 2024-03-15
Payer: COMMERCIAL

## 2024-03-15 DIAGNOSIS — M20.22 HALLUX RIGIDUS OF LEFT FOOT: Primary | ICD-10-CM

## 2024-03-15 PROCEDURE — 97110 THERAPEUTIC EXERCISES: CPT

## 2024-03-15 PROCEDURE — 97530 THERAPEUTIC ACTIVITIES: CPT

## 2024-03-15 PROCEDURE — 97112 NEUROMUSCULAR REEDUCATION: CPT

## 2024-03-15 NOTE — PROGRESS NOTES
"Daily Note     Today's date: 3/15/2024  Patient name: Kaitlynn Richards  : 1975  MRN: [id number]  Referring provider: Jocelin Massey PA-C  Dx:   Encounter Diagnosis     ICD-10-CM    1. Hallux rigidus of left foot  M20.22               Start Time: 1338  Stop Time: 1416  Total time in clinic (min): 38 minutes    Subjective: Patient reports she walked for over 1 hour, felt no pain.        Objective: See treatment diary below      Assessment: Tolerated treatment well. Progressing strength very well. No pain throughout tx, educated pt she may continue to resume PLOF in workout routine and walking program due to progressing without pain. Pt would continue to benefit from skilled PT. 1:1 with Mendez Mcneal DPT for entirety of tx.       Plan: Continue per plan of care.      POC expires Unit limit Auth Expiration date PT/OT + Visit Limit?   24 4 units N/a BOMN     HEP access code: [email or web address]  Pertinent Findings:      Test / Measure  2024   L ankle PF MMT 3/5   DF ROM 3 deg     Precautions: s/p 1ST MTPJ FUSION PSB PLANTAR PLATE REPAIR 2nd metatarsal (Left), WEIL OSTEOTOMY (Left) on 23    VISIT 1 2 3 4 5 6 7 8   Manuals 2/21 2/23 2/28 3/1 3/ 3/8 3/13 3/15   L ankle PROM CM 4' DF SA 4' DF KR 4' DF MM 4' KR 5'      A-P TC mobs  CM 4' Gr 3-4   MM 4' Gr 3-4                             Neuro Re-Ed           Fwd leans    15x3\"       Foot dome 5\"x10 5\" 10x 5\"x10        SLS  5\" 10x 5\"x10        Biodex balance           Towel scrunches   5\" 10x 5\"x10        BAPS board  1x10 FWD/BWD/SIDE/SIDE/CC/  CCW 1x10 fwd/bwd, side/side, CC/CCW 15x ea 4 way stand 15x ea 4 way stand      Toe curls + spreads    10x3\" ea 10x3\" ea 3\" 2x10 ea 2x15 spread only X30 spread only   Ther Ex           TM walk  2.0 6' 2.5 6' 2.5 6' 2.5 8' 2.5 8' 2.5 8' 8' 3 mph   Toe AROM flex/ext X20  x20 x20        Tband ankle 4-way X10 ea gtb X10 ea GTB  X10 ea GTB  15x ea btb 15x ea btb      Gastroc stretch 30\"x2 stand hep 30\" x2 stand  30\" x2 stand " " 3x30\" prostretch 3x30\" prostretch 30\"x3 off 4\" step  30\"x3 off 4\" step     HR/TR X20 ea hep Off foam 2x10 2x10 2x10 off foam 2x10 off foam 3x10 off foam 3x10 TR off foam w/ toe curl    2x10 HR off foam BLE 3x10 TR off foam w/ toe curl    2x10 HR off foam BLE   HR on leg press     NV      Leg press      3x10 100# 3x10 115# 3x10 115#    2x10 130#   Bridge +foot dome       Nv--> ? x30   Ther Activity           Side step on foam beam    5 laps ea 5 laps 2x4 laps on forefoot [id number] on forefoot X4 laps on forefoot   Tandem walking    5 laps foam beam 5 laps foam beam 4 laps on foam 2x4 laps on foam x4 laps on foam   FSU on foam           LSU on foam            Gait Training                                 Modalities                                        "

## 2024-03-20 ENCOUNTER — OFFICE VISIT (OUTPATIENT)
Dept: PHYSICAL THERAPY | Facility: REHABILITATION | Age: 49
End: 2024-03-20
Payer: COMMERCIAL

## 2024-03-20 DIAGNOSIS — M20.22 HALLUX RIGIDUS OF LEFT FOOT: Primary | ICD-10-CM

## 2024-03-20 PROCEDURE — 97530 THERAPEUTIC ACTIVITIES: CPT

## 2024-03-20 PROCEDURE — 97110 THERAPEUTIC EXERCISES: CPT

## 2024-03-20 NOTE — PROGRESS NOTES
"Daily Note     Today's date: 3/20/2024  Patient name: Kaitlynn Richards  : 1975  MRN: [id number]  Referring provider: Jocelin Massey PA-C  Dx:   Encounter Diagnosis     ICD-10-CM    1. Hallux rigidus of left foot  M20.22               Start Time: 1445  Stop Time: 1527  Total time in clinic (min): 42 minutes    Subjective: Patient reports she walked for over 1 hour on treadmill, had minor pain but was still able to complete.         Objective: See treatment diary below      Assessment: Tolerated treatment well. Pt progressing well, no significant pain increase throughout or post-tx. Pt adequately fatigued by current POC, continue to progress as tolerated with future treatment sessions. Pt would continue to benefit from skilled PT. 1:1 with Mendez Mcneal DPT from [id number], then IEP for remainder.       Plan: Continue per plan of care.      POC expires Unit limit Auth Expiration date PT/OT + Visit Limit?   24 4 units N/a BOMN     HEP access code: [email or web address]  Pertinent Findings:      Test / Measure  2024   L ankle PF MMT 3/5   DF ROM 3 deg     Precautions: s/p 1ST MTPJ FUSION PSB PLANTAR PLATE REPAIR 2nd metatarsal (Left), WEIL OSTEOTOMY (Left) on 23    VISIT 1 2 3 4 5 6 7 8 9   Manuals 2/21 2/23 2/28 3/1 3/6 3/8 3/13 3/15 3/20   L ankle PROM CM 4' DF SA 4' DF KR 4' DF MM 4' KR 5'       A-P TC mobs  CM 4' Gr 3-4   MM 4' Gr 3-4                                Neuro Re-Ed            Fwd leans    15x3\"        Foot dome 5\"x10 5\" 10x 5\"x10         SLS  5\" 10x 5\"x10         Biodex balance            Towel scrunches   5\" 10x 5\"x10         BAPS board  1x10 FWD/BWD/SIDE/SIDE/CC/  CCW 1x10 fwd/bwd, side/side, CC/CCW 15x ea 4 way stand 15x ea 4 way stand       Toe curls + spreads    10x3\" ea 10x3\" ea 3\" 2x10 ea 2x15 spread only X30 spread only np   Ther Ex            TM walk  2.0 6' 2.5 6' 2.5 6' 2.5 8' 2.5 8' 2.5 8' 8' 3 mph 8' 3 mph   Toe AROM flex/ext X20  x20 x20         Tband ankle 4-way X10 ea gtb X10 " "ea GTB  X10 ea GTB  15x ea btb 15x ea btb       Gastroc stretch 30\"x2 stand hep 30\" x2 stand  30\" x2 stand  3x30\" prostretch 3x30\" prostretch 30\"x3 off 4\" step  30\"x3 off 4\" step      HR/TR X20 ea hep Off foam 2x10 2x10 2x10 off foam 2x10 off foam 3x10 off foam 3x10 TR off foam w/ toe curl    2x10 HR off foam BLE 3x10 TR off foam w/ toe curl    2x10 HR off foam BLE 3x10 TR off foam w/ toe curl    3x10 HR off foam BLE, no shoes   HR on leg press     NV       Leg press      3x10 100# 3x10 115# 3x10 115#    2x10 130#    Bridge +foot dome       Nv--> ? x30 x30   Ther Activity            Side step on foam beam    5 laps ea 5 laps 2x4 laps on forefoot [id number] on forefoot X4 laps on forefoot [id number] on forefoot   Tandem walking    5 laps foam beam 5 laps foam beam 4 laps on foam 2x4 laps on foam x4 laps on foam x4 laps on foam   FSU on foam         2x10 L 6\" +foam, no HHA   LSU on foam          2x10 L 6\" +foam, no HHA   Gait Training                                    Modalities                                           "

## 2024-03-22 ENCOUNTER — OFFICE VISIT (OUTPATIENT)
Dept: PHYSICAL THERAPY | Facility: REHABILITATION | Age: 49
End: 2024-03-22
Payer: COMMERCIAL

## 2024-03-22 DIAGNOSIS — M20.22 HALLUX RIGIDUS OF LEFT FOOT: Primary | ICD-10-CM

## 2024-03-22 PROCEDURE — 97110 THERAPEUTIC EXERCISES: CPT

## 2024-03-22 PROCEDURE — 97530 THERAPEUTIC ACTIVITIES: CPT

## 2024-03-22 NOTE — PROGRESS NOTES
"Daily Note     Today's date: 3/22/2024  Patient name: Kaitlynn Richards  : 1975  MRN: [id number]  Referring provider: Jocelin Massey PA-C  Dx:   Encounter Diagnosis     ICD-10-CM    1. Hallux rigidus of left foot  M20.22               Start Time: 1340  Stop Time: 1419  Total time in clinic (min): 39 minutes    Subjective: Patient reports she walked for 30 minutes on TM at gym at 3.5 mph, no pain.          Objective: See treatment diary below      Assessment: Tolerated treatment well. Improving functional strength, no pain throughout tx. Educated pt on goal of return to skating, advised pt to trial this next month prior to completing formal PT to discuss tolerance when first returning. Pt verbalizes understanding, and states she will trial when 3 months post-op. Encouraged pt to discuss this with physician next week at f/u appt. Pt would continue to benefit from skilled PT. 1:1 with Mendez Mcneal DPT for entirety of tx.       Plan: Continue per plan of care.      POC expires Unit limit Auth Expiration date PT/OT + Visit Limit?   24 4 units N/a BOMN     HEP access code: [email or web address]  Pertinent Findings:      Test / Measure  2024   L ankle PF MMT 3/5   DF ROM 3 deg     Precautions: s/p 1ST MTPJ FUSION PSB PLANTAR PLATE REPAIR 2nd metatarsal (Left), WEIL OSTEOTOMY (Left) on 23    VISIT 1 2 3 4 5 6 7 8 9 10   Manuals 2/21 2/23 2/28 3/1 3/6 3/8 3/13 3/15 3/20 3/22   L ankle PROM CM 4' DF SA 4' DF KR 4' DF MM 4' KR 5'        A-P TC mobs  CM 4' Gr 3-4   MM 4' Gr 3-4                                   Neuro Re-Ed             Fwd leans    15x3\"         Foot dome 5\"x10 5\" 10x 5\"x10          SLS  5\" 10x 5\"x10          Biodex balance             Towel scrunches   5\" 10x 5\"x10          BAPS board  1x10 FWD/BWD/SIDE/SIDE/CC/  CCW 1x10 fwd/bwd, side/side, CC/CCW 15x ea 4 way stand 15x ea 4 way stand        Toe curls + spreads    10x3\" ea 10x3\" ea 3\" 2x10 ea 2x15 spread only X30 spread only np    Ther Ex         " "    TM walk  2.0 6' 2.5 6' 2.5 6' 2.5 8' 2.5 8' 2.5 8' 8' 3 mph 8' 3 mph 8' 3.5 mph   Toe AROM flex/ext X20  x20 x20          Tband ankle 4-way X10 ea gtb X10 ea GTB  X10 ea GTB  15x ea btb 15x ea btb        Gastroc stretch 30\"x2 stand hep 30\" x2 stand  30\" x2 stand  3x30\" prostretch 3x30\" prostretch 30\"x3 off 4\" step  30\"x3 off 4\" step       HR/TR X20 ea hep Off foam 2x10 2x10 2x10 off foam 2x10 off foam 3x10 off foam 3x10 TR off foam w/ toe curl    2x10 HR off foam BLE 3x10 TR off foam w/ toe curl    2x10 HR off foam BLE 3x10 TR off foam w/ toe curl    3x10 HR off foam BLE, no shoes 3x10 TR off foam w/ toe curl    3x10 HR off foam BLE, no shoes   HR on leg press     NV        Leg press      3x10 100# 3x10 115# 3x10 115#    2x10 130#  3x10 130#   Bridge +foot dome       Nv--> ? x30 x30 x30   Ther Activity             Side step on foam beam    5 laps ea 5 laps 2x4 laps on forefoot [id number] on forefoot X4 laps on forefoot [id number] on forefoot [id number] on forefoot   Tandem walking    5 laps foam beam 5 laps foam beam 4 laps on foam 2x4 laps on foam x4 laps on foam x4 laps on foam x4 laps on foam   FSU on foam         2x10 L 6\" +foam, no HHA 3x10 L 6\" +foam, no HHA   LSU on foam          2x10 L 6\" +foam, no HHA 3x10 L 6\" +foam, no HHA   Gait Training                                       Modalities                                              "

## 2024-03-27 ENCOUNTER — OFFICE VISIT (OUTPATIENT)
Dept: PHYSICAL THERAPY | Facility: REHABILITATION | Age: 49
End: 2024-03-27
Payer: COMMERCIAL

## 2024-03-27 DIAGNOSIS — M20.22 HALLUX RIGIDUS OF LEFT FOOT: Primary | ICD-10-CM

## 2024-03-27 PROCEDURE — 97530 THERAPEUTIC ACTIVITIES: CPT

## 2024-03-27 PROCEDURE — 97112 NEUROMUSCULAR REEDUCATION: CPT

## 2024-03-27 PROCEDURE — 97110 THERAPEUTIC EXERCISES: CPT

## 2024-03-27 NOTE — PROGRESS NOTES
"Daily Note     Today's date: 3/27/2024  Patient name: Kaitlynn Richards  : 1975  MRN: [id number]  Referring provider: Jocelin Massey PA-C  Dx:   Encounter Diagnosis     ICD-10-CM    1. Hallux rigidus of left foot  M20.22               Start Time: 1405  Stop Time: 1450  Total time in clinic (min): 45 minutes    Subjective: Patient reports she returned to ortho, who cleared her for return to roller skating.          Objective: See treatment diary below      Assessment: Tolerated treatment well. Improved intrinsic strength noted today with towel scrunches compared to previous sessions, although pt continues to require VC on how to perform properly. Pt would continue to benefit from skilled PT. 1:1 with Mendez Mcneal DPT for entirety of tx.       Plan: Continue per plan of care.      POC expires Unit limit Auth Expiration date PT/OT + Visit Limit?   24 4 units N/a BOMN     HEP access code: [email or web address]  Pertinent Findings:      Test / Measure  2024   L ankle PF MMT 3   DF ROM 3 deg     Precautions: s/p 1ST MTPJ FUSION PSB PLANTAR PLATE REPAIR 2nd metatarsal (Left), WEIL OSTEOTOMY (Left) on 23    VISIT 4 5 6 7 8 9 10 11   Manuals 3/1 3/6 3/8 3/13 3/15 3/20 3/22 3/27   L ankle PROM MM 4' KR 5'         A-P TC mobs  MM 4' Gr 3-4                                Neuro Re-Ed           Fwd leans 15x3\"          Foot dome           SLS           Biodex balance        NV?   Towel scrunches         3x10 towel   BAPS board 15x ea 4 way stand 15x ea 4 way stand         Toe curls + spreads 10x3\" ea 10x3\" ea 3\" 2x10 ea 2x15 spread only X30 spread only np     Ther Ex           TM walk 2.5 6' 2.5 8' 2.5 8' 2.5 8' 8' 3 mph 8' 3 mph 8' 3.5 mph 8' 3.5 mph   Toe AROM flex/ext           Tband ankle 4-way 15x ea btb 15x ea btb         Gastroc stretch 3x30\" prostretch 3x30\" prostretch 30\"x3 off 4\" step  30\"x3 off 4\" step        HR/TR 2x10 off foam 2x10 off foam 3x10 off foam 3x10 TR off foam w/ toe curl    2x10 HR off " "foam BLE 3x10 TR off foam w/ toe curl    2x10 HR off foam BLE 3x10 TR off foam w/ toe curl    3x10 HR off foam BLE, no shoes 3x10 TR off foam w/ toe curl    3x10 HR off foam BLE, no shoes 3x10 TR off foam w/ toe curl    3x10 HR off foam BLE, no shoes   HR on leg press  NV         Leg press   3x10 100# 3x10 115# 3x10 115#    2x10 130#  3x10 130# 3x10 145#   Bridge +foot dome    Nv--> ? x30 x30 x30 x30   Ther Activity           Side step on foam beam 5 laps ea 5 laps 2x4 laps on forefoot [id number] on forefoot X4 laps on forefoot [id number] on forefoot [id number] on forefoot X3 laps on forefoot   Tandem walking 5 laps foam beam 5 laps foam beam 4 laps on foam 2x4 laps on foam x4 laps on foam x4 laps on foam x4 laps on foam x4 laps on foam   FSU on foam      2x10 L 6\" +foam, no HHA 3x10 L 6\" +foam, no HHA 3x10 L 6\" +foam, no HHA   LSU on foam       2x10 L 6\" +foam, no HHA 3x10 L 6\" +foam, no HHA 3x10 L 6\" +foam, no HHA   Gait Training                                 Modalities                                        "

## 2024-03-29 ENCOUNTER — OFFICE VISIT (OUTPATIENT)
Dept: PHYSICAL THERAPY | Facility: REHABILITATION | Age: 49
End: 2024-03-29
Payer: COMMERCIAL

## 2024-03-29 DIAGNOSIS — M20.22 HALLUX RIGIDUS OF LEFT FOOT: Primary | ICD-10-CM

## 2024-03-29 PROCEDURE — 97112 NEUROMUSCULAR REEDUCATION: CPT

## 2024-03-29 PROCEDURE — 97110 THERAPEUTIC EXERCISES: CPT

## 2024-03-29 NOTE — PROGRESS NOTES
"Daily Note     Today's date: 3/29/2024  Patient name: Kaitlynn Richards  : 1975  MRN: [id number]  Referring provider: Jocelin Massey PA-C  Dx:   Encounter Diagnosis     ICD-10-CM    1. Hallux rigidus of left foot  M20.22               Start Time: 1413  Stop Time: 1500  Total time in clinic (min): 47 minutes    Subjective: Patient reports she'll be returning to [facility] next weekend.           Objective: See treatment diary below      Assessment: Tolerated treatment well. Introduced biodex to progress static stability with good tolerance. Several other progressions today with good tolerance. Pt notes fatigue but no pain with these. Pt would continue to benefit from skilled PT. 1:1 with Mendez Mcneal DPT for entirety of tx.       Plan: Continue per plan of care.      POC expires Unit limit Auth Expiration date PT/OT + Visit Limit?   24 4 units N/a BOMN     HEP access code: [email or web address]  Pertinent Findings:      Test / Measure  2024   L ankle PF MMT 3/5   DF ROM 3 deg     Precautions: s/p 1ST MTPJ FUSION PSB PLANTAR PLATE REPAIR 2nd metatarsal (Left), WEIL OSTEOTOMY (Left) on 23    VISIT 4 5 6 7 8 9 10 11 12   Manuals 3/1 3/6 3/8 3/13 3/15 3/20 3/22 3/27 3/29   L ankle PROM MM 4' KR 5'          A-P TC mobs  MM 4' Gr 3-4                                   Neuro Re-Ed            Fwd leans 15x3\"           Foot dome            SLS            Biodex balance        NV? RC 1'x2 lvl 5    LOS x5 lvl 5   Towel scrunches         3x10 towel 3x10 towel   BAPS board 15x ea 4 way stand 15x ea 4 way stand          Toe curls + spreads 10x3\" ea 10x3\" ea 3\" 2x10 ea 2x15 spread only X30 spread only np      Ther Ex            TM walk 2.5 6' 2.5 8' 2.5 8' 2.5 8' 8' 3 mph 8' 3 mph 8' 3.5 mph 8' 3.5 mph 8' 3.5 mph   Toe AROM flex/ext            Tband ankle 4-way 15x ea btb 15x ea btb          Gastroc stretch 3x30\" prostretch 3x30\" prostretch 30\"x3 off 4\" step  30\"x3 off 4\" step         HR/TR 2x10 off foam 2x10 off " "foam 3x10 off foam 3x10 TR off foam w/ toe curl    2x10 HR off foam BLE 3x10 TR off foam w/ toe curl    2x10 HR off foam BLE 3x10 TR off foam w/ toe curl    3x10 HR off foam BLE, no shoes 3x10 TR off foam w/ toe curl    3x10 HR off foam BLE, no shoes 3x10 TR off foam w/ toe curl    3x10 HR off foam BLE, no shoes 3x10 HR off foam BLE, no shoes   HR on leg press  NV          Leg press   3x10 100# 3x10 115# 3x10 115#    2x10 130#  3x10 130# 3x10 145#    DF w/ KB off step          3x10 15#   Bridge +foot dome    Nv--> ? x30 x30 x30 x30 3x10 on pball    Ther Activity            Side step on foam beam 5 laps ea 5 laps 2x4 laps on forefoot [id number] on forefoot X4 laps on forefoot [id number] on forefoot 8w9svni on forefoot X3 laps on forefoot NV w/ hurdles   Tandem walking 5 laps foam beam 5 laps foam beam 4 laps on foam 2x4 laps on foam x4 laps on foam x4 laps on foam x4 laps on foam x4 laps on foam NV w/ hurdles    FSU on foam      2x10 L 6\" +foam, no HHA 3x10 L 6\" +foam, no HHA 3x10 L 6\" +foam, no HHA    LSU on foam       2x10 L 6\" +foam, no HHA 3x10 L 6\" +foam, no HHA 3x10 L 6\" +foam, no HHA    Gait Training                                    Modalities                                           "

## 2024-04-03 ENCOUNTER — OFFICE VISIT (OUTPATIENT)
Dept: PHYSICAL THERAPY | Facility: REHABILITATION | Age: 49
End: 2024-04-03
Payer: COMMERCIAL

## 2024-04-03 DIAGNOSIS — M20.22 HALLUX RIGIDUS OF LEFT FOOT: Primary | ICD-10-CM

## 2024-04-03 PROCEDURE — 97112 NEUROMUSCULAR REEDUCATION: CPT

## 2024-04-03 PROCEDURE — 97530 THERAPEUTIC ACTIVITIES: CPT

## 2024-04-03 PROCEDURE — 97110 THERAPEUTIC EXERCISES: CPT

## 2024-04-03 NOTE — PROGRESS NOTES
"Daily Note     Today's date: 4/3/2024  Patient name: Kaitlynn Richards  : 1975  MRN: [id number]  Referring provider: Jocelin Massey PA-C  Dx:   Encounter Diagnosis     ICD-10-CM    1. Hallux rigidus of left foot  M20.22               Start Time: 1358  Stop Time: 1451  Total time in clinic (min): 53 minutes    Subjective: Patient reports she's returning to [facility] this Friday. Has not been going on her long walks outside due to rain.           Objective: See treatment diary below      Assessment: Tolerated treatment well. Pt progressing well, no significant pain increase throughout or post-tx. Pt adequately fatigued by current POC, continue to progress as tolerated with future treatment sessions. Pt would continue to benefit from skilled PT. 1:1 with Mendez Mcneal DPT for entirety of tx.       Plan: Continue per plan of care.      POC expires Unit limit Auth Expiration date PT/OT + Visit Limit?   24 4 units N/a BOMN     HEP access code: [email or web address]  Pertinent Findings:      Test / Measure  2024   L ankle PF MMT 3/   DF ROM 3 deg     Precautions: s/p 1ST MTPJ FUSION PSB PLANTAR PLATE REPAIR 2nd metatarsal (Left), WEIL OSTEOTOMY (Left) on 23    VISIT 4 5 6 7 8 9 10 11 12 13   Manuals 3/1 3/6 3/8 3/13 3/15 3/20 3/22 3/27 3/29 4/3   L ankle PROM MM 4' KR 5'           A-P TC mobs  MM 4' Gr 3-4                                      Neuro Re-Ed             Fwd leans 15x3\"            Foot dome             SLS             Biodex balance        NV? RC 1'x2 lvl 5    LOS x5 lvl 5 RC 1'x3 lvl 5    LOS x2 lvl 5    Maze x2 easy, lvl 5   Towel scrunches         3x10 towel 3x10 towel 3x10 towel   BAPS board 15x ea 4 way stand 15x ea 4 way stand           Toe curls + spreads 10x3\" ea 10x3\" ea 3\" 2x10 ea 2x15 spread only X30 spread only np       Ther Ex             TM walk 2.5 6' 2.5 8' 2.5 8' 2.5 8' 8' 3 mph 8' 3 mph 8' 3.5 mph 8' 3.5 mph 8' 3.5 mph 8' 3.5 mph   Toe AROM flex/ext             Tband ankle " "4-way 15x ea btb 15x ea btb           Gastroc stretch 3x30\" prostretch 3x30\" prostretch 30\"x3 off 4\" step  30\"x3 off 4\" step          HR/TR 2x10 off foam 2x10 off foam 3x10 off foam 3x10 TR off foam w/ toe curl    2x10 HR off foam BLE 3x10 TR off foam w/ toe curl    2x10 HR off foam BLE 3x10 TR off foam w/ toe curl    3x10 HR off foam BLE, no shoes 3x10 TR off foam w/ toe curl    3x10 HR off foam BLE, no shoes 3x10 TR off foam w/ toe curl    3x10 HR off foam BLE, no shoes 3x10 HR off foam BLE, no shoes 3x10 HR off foam BLE, no shoes   HR on leg press  NV           Leg press   3x10 100# 3x10 115# 3x10 115#    2x10 130#  3x10 130# 3x10 145#  3x10 160#   DF w/ KB off step          3x10 15# nv   Bridge +foot dome    Nv--> ? x30 x30 x30 x30 3x10 on pball  3x10 on pball    Ther Activity             Side step on foam beam 5 laps ea 5 laps 2x4 laps on forefoot [id number] on forefoot X4 laps on forefoot [id number] on forefoot [id number] on forefoot X3 laps on forefoot NV w/ hurdles X3 alps w/ hurdles   Tandem walking 5 laps foam beam 5 laps foam beam 4 laps on foam 2x4 laps on foam x4 laps on foam x4 laps on foam x4 laps on foam x4 laps on foam NV w/ hurdles  X4 laps on foam, w/ hurdles    FSU on foam      2x10 L 6\" +foam, no HHA 3x10 L 6\" +foam, no HHA 3x10 L 6\" +foam, no HHA     LSU on foam       2x10 L 6\" +foam, no HHA 3x10 L 6\" +foam, no HHA 3x10 L 6\" +foam, no HHA     Gait Training                                       Modalities                                              "

## 2024-04-04 ENCOUNTER — OFFICE VISIT (OUTPATIENT)
Dept: PHYSICAL THERAPY | Facility: REHABILITATION | Age: 49
End: 2024-04-04
Payer: COMMERCIAL

## 2024-04-04 DIAGNOSIS — M20.22 HALLUX RIGIDUS OF LEFT FOOT: Primary | ICD-10-CM

## 2024-04-04 PROCEDURE — 97530 THERAPEUTIC ACTIVITIES: CPT

## 2024-04-04 PROCEDURE — 97110 THERAPEUTIC EXERCISES: CPT

## 2024-04-04 PROCEDURE — 97112 NEUROMUSCULAR REEDUCATION: CPT

## 2024-04-04 NOTE — PROGRESS NOTES
Daily Note     Today's date: 2024  Patient name: Kaitlynn Richards  : 1975  MRN: [id number]  Referring provider: Jocelin Massey PA-C  Dx:   Encounter Diagnosis     ICD-10-CM    1. Hallux rigidus of left foot  M20.22               Start Time: 1130  Stop Time: 1218  Total time in clinic (min): 48 minutes    Subjective: Patient reports she's returning to [facility] tomorrow.            Objective: See treatment diary below      Assessment: Tolerated treatment well. Improving dynamic balance noted during today's session. Continues to be challenged by biodex. Pt would continue to benefit from skilled PT. Pt under supervision of Flo Cheung DPT from [id number], then 1:1 with Mendez Mcneal DPT for remainder of tx.       Plan: Continue per plan of care.      POC expires Unit limit Auth Expiration date PT/OT + Visit Limit?   24 4 units N/a BOMN     HEP access code: [email or web address]  Pertinent Findings:      Test / Measure  2024   L ankle PF MMT 3/   DF ROM 3 deg     Precautions: s/p 1ST MTPJ FUSION PSB PLANTAR PLATE REPAIR 2nd metatarsal (Left), WEIL OSTEOTOMY (Left) on 23    VISIT 9 10 11 12 13 14   Manuals 3/20 3/22 3/27 3/29 4/3 4/4   L ankle PROM         A-P TC mobs                            Neuro Re-Ed         Fwd leans         Foot dome         SLS         Biodex balance   NV? RC 1'x2 lvl 5    LOS x5 lvl 5 RC 1'x3 lvl 5    LOS x2 lvl 5    Maze x2 easy, lvl 5 RC 1'x3 lvl 5    LOS x2 lvl 5    Maze x2 easy, lvl 5   Towel scrunches    3x10 towel 3x10 towel 3x10 towel 3x10 towel   BAPS board         Toe curls + spreads np        Ther Ex         TM walk 8' 3 mph 8' 3.5 mph 8' 3.5 mph 8' 3.5 mph 8' 3.5 mph 8' 3.5 mph   Toe AROM flex/ext         Tband ankle 4-way         Gastroc stretch         HR/TR 3x10 TR off foam w/ toe curl    3x10 HR off foam BLE, no shoes 3x10 TR off foam w/ toe curl    3x10 HR off foam BLE, no shoes 3x10 TR off foam w/ toe curl    3x10 HR off foam BLE, no shoes 3x10 HR  "off foam BLE, no shoes 3x10 HR off foam BLE, no shoes 3x10 HR off foam BLE, no shoes   HR on leg press         Leg press  3x10 130# 3x10 145#  3x10 160# 3x10 160#   DF w/ KB off step     3x10 15# nv 3x10 15#   Bridge +foot dome x30 x30 x30 3x10 on pball  3x10 on pball     Ther Activity         Side step on foam beam [id number] on forefoot [id number] on forefoot X3 laps on forefoot NV w/ hurdles X3 alps w/ hurdles X3 alps w/ hurdles   Tandem walking x4 laps on foam x4 laps on foam x4 laps on foam NV w/ hurdles  X4 laps on foam, w/ hurdles  X4 laps on foam, w/ hurdles    FSU on foam 2x10 L 6\" +foam, no HHA 3x10 L 6\" +foam, no HHA 3x10 L 6\" +foam, no HHA      LSU on foam  2x10 L 6\" +foam, no HHA 3x10 L 6\" +foam, no HHA 3x10 L 6\" +foam, no HHA      Gait Training                           Modalities                                  "

## 2024-04-10 ENCOUNTER — OFFICE VISIT (OUTPATIENT)
Dept: PHYSICAL THERAPY | Facility: REHABILITATION | Age: 49
End: 2024-04-10
Payer: COMMERCIAL

## 2024-04-10 DIAGNOSIS — M20.22 HALLUX RIGIDUS OF LEFT FOOT: Primary | ICD-10-CM

## 2024-04-10 PROCEDURE — 97112 NEUROMUSCULAR REEDUCATION: CPT

## 2024-04-10 PROCEDURE — 97110 THERAPEUTIC EXERCISES: CPT

## 2024-04-10 NOTE — PROGRESS NOTES
Daily Note     Today's date: 4/10/2024  Patient name: Kaitlynn Richards  : 1975  MRN: [id number]  Referring provider: Jocelin Massey PA-C  Dx:   Encounter Diagnosis     ICD-10-CM    1. Hallux rigidus of left foot  M20.22               Start Time: 1436  Stop Time: 1510  Total time in clinic (min): 34 minutes    Subjective: Patient reports she went rollerskating, had good tolerance overall. Felt 2/10 pain the following day, but subsided.            Objective: See treatment diary below      Assessment: Tolerated treatment well. Continues to progress strength and balance well, will plan to d/c NV and review comprehensive HEP. Pt would continue to benefit from skilled PT. 1:1 with Mendez Mcneal DPT for entirety of tx.       Plan: Continue per plan of care.      POC expires Unit limit Auth Expiration date PT/OT + Visit Limit?   24 4 units N/a BOMN     HEP access code: [email or web address]  Pertinent Findings:      Test / Measure  2024   L ankle PF MMT 3/5   DF ROM 3 deg     Precautions: s/p 1ST MTPJ FUSION PSB PLANTAR PLATE REPAIR 2nd metatarsal (Left), WEIL OSTEOTOMY (Left) on 23    VISIT 9 10 11 12 13 14 15   Manuals 3/20 3/22 3/27 3/29 4/3 4/ 4/10   L ankle PROM          A-P TC mobs                               Neuro Re-Ed          Fwd leans          Foot dome          SLS          Biodex balance   NV? RC 1'x2 lvl 5    LOS x5 lvl 5 RC 1'x3 lvl 5    LOS x2 lvl 5    Maze x2 easy, lvl 5 RC 1'x3 lvl 5    LOS x2 lvl 5    Maze x2 easy, lvl 5 RC 1'x3 lvl 5    LOS x2 lvl 5    Maze x2 easy, lvl 5   Towel scrunches    3x10 towel 3x10 towel 3x10 towel 3x10 towel np   BAPS board          Toe curls + spreads np         Ther Ex          TM walk 8' 3 mph 8' 3.5 mph 8' 3.5 mph 8' 3.5 mph 8' 3.5 mph 8' 3.5 mph 8' 3.5 mph   Toe AROM flex/ext          Tband ankle 4-way          Gastroc stretch          HR/TR 3x10 TR off foam w/ toe curl    3x10 HR off foam BLE, no shoes 3x10 TR off foam w/ toe curl    3x10 HR off foam  "BLE, no shoes 3x10 TR off foam w/ toe curl    3x10 HR off foam BLE, no shoes 3x10 HR off foam BLE, no shoes 3x10 HR off foam BLE, no shoes 3x10 HR off foam BLE, no shoes 3x10 HR off foam BLE, no shoes   HR on leg press          Leg press  3x10 130# 3x10 145#  3x10 160# 3x10 160# 3x10 160#   DF w/ KB off step     3x10 15# nv 3x10 15# nv   Bridge +foot dome x30 x30 x30 3x10 on pball  3x10 on pball      Ther Activity          Side step on foam beam [id number] on forefoot [id number] on forefoot X3 laps on forefoot NV w/ hurdles X3 alps w/ hurdles X3 alps w/ hurdles x4 laps on forefoot   Tandem walking x4 laps on foam x4 laps on foam x4 laps on foam NV w/ hurdles  X4 laps on foam, w/ hurdles  X4 laps on foam, w/ hurdles  x4 laps on foam   FSU on foam 2x10 L 6\" +foam, no HHA 3x10 L 6\" +foam, no HHA 3x10 L 6\" +foam, no HHA       LSU on foam  2x10 L 6\" +foam, no HHA 3x10 L 6\" +foam, no HHA 3x10 L 6\" +foam, no HHA       Gait Training                              Modalities                                     "

## 2024-04-12 ENCOUNTER — APPOINTMENT (OUTPATIENT)
Dept: PHYSICAL THERAPY | Facility: REHABILITATION | Age: 49
End: 2024-04-12
Payer: COMMERCIAL

## 2024-04-19 ENCOUNTER — APPOINTMENT (OUTPATIENT)
Dept: PHYSICAL THERAPY | Facility: REHABILITATION | Age: 49
End: 2024-04-19
Payer: COMMERCIAL

## 2024-05-02 ENCOUNTER — OFFICE VISIT (OUTPATIENT)
Dept: FAMILY MEDICINE CLINIC | Facility: CLINIC | Age: 49
End: 2024-05-02

## 2024-05-02 VITALS
BODY MASS INDEX: 27.11 KG/M2 | TEMPERATURE: 98 F | HEART RATE: 63 BPM | OXYGEN SATURATION: 99 % | DIASTOLIC BLOOD PRESSURE: 70 MMHG | WEIGHT: 158.8 LBS | HEIGHT: 64 IN | SYSTOLIC BLOOD PRESSURE: 120 MMHG

## 2024-05-02 DIAGNOSIS — L08.9 SKIN INFECTION: Primary | ICD-10-CM

## 2024-05-02 DIAGNOSIS — B86 SCABIES: ICD-10-CM

## 2024-05-02 RX ORDER — PERMETHRIN 50 MG/G
CREAM TOPICAL ONCE
Qty: 60 G | Refills: 0 | Status: SHIPPED | OUTPATIENT
Start: 2024-05-02 | End: 2024-05-02

## 2024-05-02 NOTE — PROGRESS NOTES
Name: Kaitlynn Richards      : 1975      MRN: [id number]  Encounter Provider: MIKI Diamond  Encounter Date: 2024   Encounter department: [facility]    Assessment & Plan     1. Skin infection  Comments:  Skin infection in the right axilla after shaving   Erythematous and slight swelling   Mupirocin ointment ordered  Orders:  -     mupirocin (BACTROBAN) 2 % ointment; Apply topically 3 (three) times a day    2. Scabies  -     permethrin (ELIMITE) 5 % cream; Apply topically once for 1 dose           Subjective      Rash  This is a new problem. The current episode started in the past 7 days. The problem has been gradually worsening since onset. The affected locations include the right axilla. The problem is mild. The rash is characterized by dryness and redness. She was exposed to nothing. The rash first occurred at home. Pertinent negatives include no anorexia, congestion, cough, decreased physical activity, decreased responsiveness, decreased sleep, drinking less, diarrhea, facial edema, fatigue, fever, itching, joint pain, rhinorrhea, shortness of breath, sore throat or vomiting. Past treatments include nothing. The treatment provided no relief. There is no history of allergies, asthma, eczema or varicella. There were no sick contacts.     Review of Systems   Constitutional:  Negative for activity change, chills, decreased responsiveness, fatigue and fever.   HENT:  Negative for congestion, ear pain, rhinorrhea, sore throat and trouble swallowing.    Eyes:  Negative for pain and visual disturbance.   Respiratory:  Negative for cough, chest tightness and shortness of breath.    Cardiovascular:  Negative for chest pain, palpitations and leg swelling.   Gastrointestinal:  Negative for abdominal pain, anorexia, constipation, diarrhea, nausea and vomiting.   Genitourinary:  Negative for difficulty urinating, dysuria, hematuria and urgency.   Musculoskeletal:  Negative for  arthralgias, back pain and joint pain.   Skin:  Positive for rash. Negative for color change and itching.   Neurological:  Negative for dizziness, seizures, syncope and headaches.   Psychiatric/Behavioral:  Negative for dysphoric mood. The patient is not nervous/anxious.    All other systems reviewed and are negative.      Current Outpatient Medications on File Prior to Visit   Medication Sig    Aspirin-Acetaminophen-Caffeine (EXCEDRIN PO) Take by mouth if needed    calcium carbonate (OS-AUGIE) 600 MG tablet every 24 hours    cetirizine (ZyrTEC) 10 mg tablet TAKE 1 TABLET BY MOUTH EVERY DAY    Cholecalciferol 5000 units TABS take 1 capsule by oral route  every day start after completing weekly dose    cyclobenzaprine (FLEXERIL) 10 mg tablet Take 1 tablet (10 mg total) by mouth 2 (two) times a day as needed for muscle spasms    fexofenadine (ALLEGRA) 60 MG tablet Take 1 tablet (60 mg total) by mouth daily    ibuprofen (MOTRIN) 400 mg tablet Take 1 tablet (400 mg total) by mouth every 6 (six) hours as needed for mild pain (Body aches or fever) for up to 7 days    ibuprofen (MOTRIN) 600 mg tablet Take 1 tablet (600 mg total) by mouth every 6 (six) hours as needed for mild pain    levothyroxine 50 mcg tablet Take 1 tablet (50 mcg total) by mouth daily    loratadine (CLARITIN) 10 mg tablet TAKE 1 TABLET BY MOUTH EVERY DAY (Patient not taking: Reported on 12/19/2023)    meloxicam (MOBIC) 15 mg tablet Take 15 mg by mouth daily    methocarbamol (ROBAXIN) 750 mg tablet Take 750 mg by mouth every 6 (six) hours as needed    norethindrone-ethinyl estradiol (Junel FE 1/20) 1-20 MG-MCG per tablet Take 1 tablet by mouth daily Take 1 active tablet for 84 days then the inactive for 7 days    omeprazole (PriLOSEC) 40 MG capsule Take 1 capsule (40 mg total) by mouth daily (Patient taking differently: Take 40 mg by mouth every evening)    SUMAtriptan (IMITREX) 50 mg tablet Take 1 tablet (50 mg total) by mouth once as needed for migraine  "for up to 30 doses       Objective     /70 (BP Location: Left arm, Patient Position: Sitting, Cuff Size: Standard)   Pulse 63   Temp 98 °F (36.7 °C) (Temporal)   Ht 5' 4\" (1.626 m)   Wt 72 kg (158 lb 12.8 oz)   SpO2 99%   BMI 27.26 kg/m²     Physical Exam  Vitals and nursing note reviewed.   Constitutional:       Appearance: Normal appearance. She is normal weight.   HENT:      Head: Normocephalic.      Right Ear: Tympanic membrane, ear canal and external ear normal. There is no impacted cerumen.      Left Ear: Tympanic membrane, ear canal and external ear normal. There is no impacted cerumen.      Nose: Nose normal.      Mouth/Throat:      Mouth: Mucous membranes are moist.      Pharynx: Oropharynx is clear.   Eyes:      Extraocular Movements: Extraocular movements intact.      Conjunctiva/sclera: Conjunctivae normal.      Pupils: Pupils are equal, round, and reactive to light.   Cardiovascular:      Rate and Rhythm: Normal rate and regular rhythm.      Pulses: Normal pulses.      Heart sounds: Normal heart sounds.   Pulmonary:      Effort: Pulmonary effort is normal.      Breath sounds: Normal breath sounds.   Abdominal:      General: Bowel sounds are normal. There is no distension.      Palpations: Abdomen is soft.      Tenderness: There is no abdominal tenderness.   Musculoskeletal:         General: Normal range of motion.      Cervical back: Normal range of motion.   Skin:     General: Skin is warm.      Capillary Refill: Capillary refill takes less than 2 seconds.      Findings: Rash present.          Neurological:      General: No focal deficit present.      Mental Status: She is alert and oriented to person, place, and time. Mental status is at baseline.   Psychiatric:         Mood and Affect: Mood normal.         Behavior: Behavior normal.         Thought Content: Thought content normal.         Judgment: Judgment normal.       Patient Instructions   Skin Yeast Infection   WHAT YOU NEED TO KNOW: "   Yeast is normally present on the skin. Infection happens when you have too much yeast, or when it gets into a cut on your skin. Certain types of mold and fungus can cause a yeast infection. A skin yeast infection can appear anywhere on your skin or nail beds. Skin yeast infections are usually found on warm, moist parts of the body. Examples include between skin folds or under the breasts.  DISCHARGE INSTRUCTIONS:   Return to the emergency department if:   You have signs of infection, such as pus, warmth or red streaks coming from the wound, or a fever.      Call your doctor if:   Your symptoms worsen or do not get better within 7 to 10 days.    You have new or returning signs of a skin yeast infection after treatment.    You have questions or concerns about your condition or care.    Medicines:   Antifungal medicine  may be given as a cream, ointment, or pill.    Take your medicine as directed.  Contact your healthcare provider if you think your medicine is not helping or if you have side effects. Tell your provider if you are allergic to any medicine. Keep a list of the medicines, vitamins, and herbs you take. Include the amounts, and when and why you take them. Bring the list or the pill bottles to follow-up visits. Carry your medicine list with you in case of an emergency.    Care for the skin near the infection:  You may only have discolored patches of skin, or areas that are dry and flaking. Care for these skin problems as directed by your healthcare provider. If you have painful skin or an open sore, you will need to protect the skin and prevent damage. You will also need to keep the skin dry as much as possible. Ask your healthcare provider how to care for your skin while the infection clears. The following are general guidelines for caring for painful or open skin:  Keep the skin clean.  Ask your healthcare provider if you should wash with mild soap and water. Do not use soap that contains alcohol. Alcohol  can dry and irritate the skin and make symptoms worse. Your baby's healthcare provider may tell you to use diaper cream or ointment when you change his or her diaper. This will protect the skin and prevent moisture from collecting.    Keep the skin dry.  Pat the area dry with a towel. Do not rub, because this may irritate the skin. If you have a skin yeast infection between skin folds, lift the top part gently and hold it while you dry between your skin folds. Always dry your feet completely after you swim or bathe, including between your toes. Dry your skin if you are sweating from exercise or exposure to heat. Use a clean towel each time to prevent spreading or continuing the infection.    Keep the skin protected.  Ask your healthcare provider if you should cover the area with a bandage or leave it open. Check your skin each day to make sure you do not have new or worsening problems. You may need to have someone check the skin if you cannot see the area easily.    Prevent another skin yeast infection:   Do not share clothing or towels    Wear shower shoes if you need to use a public shower    Dry your feet completely after you bathe, and apply antifungal powder or cream as directed    Put on socks before you get dressed so you do not spread fungus from your feet    Wear light clothing that allows air to get to your skin    Manage your weight to prevent skin folds where yeast can collect    Manage diabetes    Use antibiotics correctly to prevent antibiotic resistance    Change your baby's diaper often, and keep the area clean and dry as much as possible    Use a diaper cream or ointment that contains zinc oxide or dimethicone on your baby's diaper area as directed    Follow up with your doctor as directed:  Write down your questions so you remember to ask them during your visits.  © Copyright [employer] 2023 Information is for End User's use only and may not be sold, redistributed or otherwise used for commercial  purposes.  The above information is an  only. It is not intended as medical advice for individual conditions or treatments. Talk to your doctor, nurse or pharmacist before following any medical regimen to see if it is safe and effective for you.      MIKI Diamond

## 2024-05-02 NOTE — PATIENT INSTRUCTIONS
Skin Yeast Infection   WHAT YOU NEED TO KNOW:   Yeast is normally present on the skin. Infection happens when you have too much yeast, or when it gets into a cut on your skin. Certain types of mold and fungus can cause a yeast infection. A skin yeast infection can appear anywhere on your skin or nail beds. Skin yeast infections are usually found on warm, moist parts of the body. Examples include between skin folds or under the breasts.  DISCHARGE INSTRUCTIONS:   Return to the emergency department if:   You have signs of infection, such as pus, warmth or red streaks coming from the wound, or a fever.      Call your doctor if:   Your symptoms worsen or do not get better within 7 to 10 days.    You have new or returning signs of a skin yeast infection after treatment.    You have questions or concerns about your condition or care.    Medicines:   Antifungal medicine  may be given as a cream, ointment, or pill.    Take your medicine as directed.  Contact your healthcare provider if you think your medicine is not helping or if you have side effects. Tell your provider if you are allergic to any medicine. Keep a list of the medicines, vitamins, and herbs you take. Include the amounts, and when and why you take them. Bring the list or the pill bottles to follow-up visits. Carry your medicine list with you in case of an emergency.    Care for the skin near the infection:  You may only have discolored patches of skin, or areas that are dry and flaking. Care for these skin problems as directed by your healthcare provider. If you have painful skin or an open sore, you will need to protect the skin and prevent damage. You will also need to keep the skin dry as much as possible. Ask your healthcare provider how to care for your skin while the infection clears. The following are general guidelines for caring for painful or open skin:  Keep the skin clean.  Ask your healthcare provider if you should wash with mild soap and water.  Do not use soap that contains alcohol. Alcohol can dry and irritate the skin and make symptoms worse. Your baby's healthcare provider may tell you to use diaper cream or ointment when you change his or her diaper. This will protect the skin and prevent moisture from collecting.    Keep the skin dry.  Pat the area dry with a towel. Do not rub, because this may irritate the skin. If you have a skin yeast infection between skin folds, lift the top part gently and hold it while you dry between your skin folds. Always dry your feet completely after you swim or bathe, including between your toes. Dry your skin if you are sweating from exercise or exposure to heat. Use a clean towel each time to prevent spreading or continuing the infection.    Keep the skin protected.  Ask your healthcare provider if you should cover the area with a bandage or leave it open. Check your skin each day to make sure you do not have new or worsening problems. You may need to have someone check the skin if you cannot see the area easily.    Prevent another skin yeast infection:   Do not share clothing or towels    Wear shower shoes if you need to use a public shower    Dry your feet completely after you bathe, and apply antifungal powder or cream as directed    Put on socks before you get dressed so you do not spread fungus from your feet    Wear light clothing that allows air to get to your skin    Manage your weight to prevent skin folds where yeast can collect    Manage diabetes    Use antibiotics correctly to prevent antibiotic resistance    Change your baby's diaper often, and keep the area clean and dry as much as possible    Use a diaper cream or ointment that contains zinc oxide or dimethicone on your baby's diaper area as directed    Follow up with your doctor as directed:  Write down your questions so you remember to ask them during your visits.  © Copyright [employer] 2023 Information is for End User's use only and may not be sold,  redistributed or otherwise used for commercial purposes.  The above information is an  only. It is not intended as medical advice for individual conditions or treatments. Talk to your doctor, nurse or pharmacist before following any medical regimen to see if it is safe and effective for you.

## 2024-06-05 ENCOUNTER — TELEPHONE (OUTPATIENT)
Dept: FAMILY MEDICINE CLINIC | Facility: CLINIC | Age: 49
End: 2024-06-05

## 2024-06-12 ENCOUNTER — OFFICE VISIT (OUTPATIENT)
Dept: FAMILY MEDICINE CLINIC | Facility: CLINIC | Age: 49
End: 2024-06-12
Payer: COMMERCIAL

## 2024-06-12 VITALS
WEIGHT: 153.2 LBS | DIASTOLIC BLOOD PRESSURE: 80 MMHG | HEART RATE: 86 BPM | HEIGHT: 65 IN | OXYGEN SATURATION: 100 % | BODY MASS INDEX: 25.52 KG/M2 | SYSTOLIC BLOOD PRESSURE: 110 MMHG | TEMPERATURE: 98.1 F | RESPIRATION RATE: 18 BRPM

## 2024-06-12 DIAGNOSIS — Z00.00 MEDICARE ANNUAL WELLNESS VISIT, SUBSEQUENT: Primary | ICD-10-CM

## 2024-06-12 DIAGNOSIS — J01.00 ACUTE NON-RECURRENT MAXILLARY SINUSITIS: ICD-10-CM

## 2024-06-12 DIAGNOSIS — E03.8 OTHER SPECIFIED HYPOTHYROIDISM: ICD-10-CM

## 2024-06-12 DIAGNOSIS — G43.809 OTHER MIGRAINE WITHOUT STATUS MIGRAINOSUS, NOT INTRACTABLE: ICD-10-CM

## 2024-06-12 DIAGNOSIS — K21.9 GASTROESOPHAGEAL REFLUX DISEASE WITHOUT ESOPHAGITIS: ICD-10-CM

## 2024-06-12 DIAGNOSIS — E55.9 VITAMIN D DEFICIENCY: ICD-10-CM

## 2024-06-12 PROBLEM — E78.00 ELEVATED CHOLESTEROL: Status: RESOLVED | Noted: 2022-12-22 | Resolved: 2024-06-12

## 2024-06-12 PROCEDURE — 99213 OFFICE O/P EST LOW 20 MIN: CPT

## 2024-06-12 PROCEDURE — G0439 PPPS, SUBSEQ VISIT: HCPCS

## 2024-06-12 RX ORDER — AMOXICILLIN 500 MG/1
500 CAPSULE ORAL EVERY 12 HOURS SCHEDULED
Qty: 20 CAPSULE | Refills: 0 | Status: SHIPPED | OUTPATIENT
Start: 2024-06-12 | End: 2024-06-22

## 2024-06-12 NOTE — PROGRESS NOTES
Ambulatory Visit  Name: Kaitlynn Richards      : 1975      MRN: [id number]  Encounter Provider: MIKI Diamond  Encounter Date: 2024   Encounter department: [facility]    Assessment & Plan   1. Medicare annual wellness visit, subsequent  2. Other migraine without status migrainosus, not intractable  Assessment & Plan:  Headache/migraine treatment:   Abortive medications (for immediate treatment of a headache): Ok to take ibuprofen or acetaminophen for headaches, but try to limit the amount and frequency that you are taking to avoid medication overuse/rebound headache. Ideally no more than 2-3 days per week.    Lifestyle Recommendations:  - Maintain good sleep hygiene.  Going to bed and waking up at consistent times, avoiding excessive daytime naps, avoiding caffeinated beverages in the evening, avoid excessive stimulation in the evening and generally using bed primarily for sleeping.  One hour before bedtime would recommend turning lights down lower, decreasing your activity (may read quietly, listen to music at a low volume). When you get into bed, should eliminate all technology (no texting, emailing, playing with your phone, iPad or tablet in bed).  - Maintain good hydration. Drink  2L of fluid a day (4 typical small water bottles)  - Maintain good nutrition. In particular don't skip meals and eat balanced meals regularly.  -Maintain proper exercise 150 minutes/week  3. Gastroesophageal reflux disease without esophagitis  Assessment & Plan:  Current medications: Omeprazole 40mg   Continue current medication regimen   4. Other specified hypothyroidism  Assessment & Plan:   Last TSH 2.389  Current medication: Levothyroxine 50mcg  Continue current medication regimen   5. Vitamin D deficiency  Assessment & Plan:  Current medication: Cholecalciferol 5000 units  Continue current medication regimen    6. BMI 25.0-25.9,adult  7. Acute non-recurrent maxillary  sinusitis  -     amoxicillin (AMOXIL) 500 mg capsule; Take 1 capsule (500 mg total) by mouth every 12 (twelve) hours for 10 days       Preventive health issues were discussed with patient, and age appropriate screening tests were ordered as noted in patient's After Visit Summary. Personalized health advice and appropriate referrals for health education or preventive services given if needed, as noted in patient's After Visit Summary.    History of Present Illness     Sore Throat   This is a recurrent problem. The current episode started in the past 7 days. The problem has been waxing and waning. There has been no fever. The pain is at a severity of 3/10. The pain is mild. Associated symptoms include congestion, headaches and a hoarse voice. Pertinent negatives include no abdominal pain, coughing, diarrhea, drooling, ear discharge, ear pain, plugged ear sensation, shortness of breath, stridor, trouble swallowing or vomiting. She has tried nothing for the symptoms.      Patient Care Team:  MIKI Diamond as PCP - General (Nurse Practitioner)    Review of Systems   Constitutional:  Positive for activity change. Negative for chills, fatigue and fever.   HENT:  Positive for congestion, hoarse voice, postnasal drip, sinus pressure, sinus pain and sore throat. Negative for drooling, ear discharge, ear pain, rhinorrhea and trouble swallowing.    Eyes:  Negative for pain and visual disturbance.   Respiratory:  Negative for cough, chest tightness, shortness of breath and stridor.    Cardiovascular:  Negative for chest pain, palpitations and leg swelling.   Gastrointestinal:  Negative for abdominal pain, constipation, diarrhea, nausea and vomiting.   Genitourinary:  Negative for difficulty urinating, dysuria, hematuria and urgency.   Musculoskeletal:  Negative for arthralgias and back pain.   Skin:  Negative for color change and rash.   Neurological:  Positive for headaches. Negative for dizziness, seizures and syncope.    Psychiatric/Behavioral:  Negative for dysphoric mood. The patient is not nervous/anxious.    All other systems reviewed and are negative.    Medical History Reviewed by provider this encounter:  Tobacco  Allergies  Meds  Problems  Med Hx  Surg Hx  Fam Hx       Annual Wellness Visit Questionnaire   Kaitlynn is here for her Subsequent Wellness visit. Last Medicare Wellness visit information reviewed, patient interviewed and updates made to the record today.      Health Risk Assessment:   Patient rates overall health as good. Patient feels that their physical health rating is same. Patient is satisfied with their life. Eyesight was rated as same. Hearing was rated as same. Patient feels that their emotional and mental health rating is same. Patients states they are never, rarely angry. Patient states they are sometimes unusually tired/fatigued. Pain experienced in the last 7 days has been none. Patient states that she has experienced no weight loss or gain in last 6 months.     Fall Risk Screening:   In the past year, patient has experienced: no history of falling in past year      Urinary Incontinence Screening:   Patient has not leaked urine accidently in the last six months.     Home Safety:  Patient does not have trouble with stairs inside or outside of their home. Patient has working smoke alarms and has working carbon monoxide detector. Home safety hazards include: none.     Nutrition:   Current diet is Regular.     Medications:   Patient is currently taking over-the-counter supplements. OTC medications include: see medication list. Patient is able to manage medications.     Activities of Daily Living (ADLs)/Instrumental Activities of Daily Living (IADLs):   Walk and transfer into and out of bed and chair?: Yes  Dress and groom yourself?: Yes    Bathe or shower yourself?: Yes    Feed yourself? Yes  Do your laundry/housekeeping?: Yes  Manage your money, pay your bills and track your expenses?: Yes  Make  your own meals?: Yes    Do your own shopping?: Yes    Previous Hospitalizations:   Any hospitalizations or ED visits within the last 12 months?: No      Advance Care Planning:   Living will: Yes    Advanced directive: Yes    Advanced directive counseling given: Yes      PREVENTIVE SCREENINGS      Cardiovascular Screening:    General: Screening Not Indicated and History Lipid Disorder      Diabetes Screening:     General: Screening Current      Colorectal Cancer Screening:     General: Risks and Benefits Discussed      Breast Cancer Screening:     General: Screening Current      Cervical Cancer Screening:    General: Screening Current      Lung Cancer Screening:     General: Screening Not Indicated      Hepatitis C Screening:    General: Screening Current    Screening, Brief Intervention, and Referral to Treatment (SBIRT)    Screening  Typical number of drinks in a day: 0  Typical number of drinks in a week: 0  Interpretation: Low risk drinking behavior.    Brief Intervention  Alcohol & drug use screenings were reviewed. No concerns regarding substance use disorder identified.     Time Spent  Time spent screening/evaluating the patient for alcohol misuse: 0 minutes. Time spent providing alcohol/substance abuse assessment and intervention services: 0 minutes.    Other Counseling Topics:   Car/seat belt/driving safety, skin self-exam, sunscreen and calcium and vitamin D intake and regular weightbearing exercise.     Social Determinants of Health     Financial Resource Strain: Low Risk  (12/22/2022)    Overall Financial Resource Strain (CARDIA)     Difficulty of Paying Living Expenses: Not hard at all   Food Insecurity: No Food Insecurity (6/12/2024)    Hunger Vital Sign     Worried About Running Out of Food in the Last Year: Never true     Ran Out of Food in the Last Year: Never true   Transportation Needs: No Transportation Needs (6/12/2024)    PRAPARE - Transportation     Lack of Transportation (Medical): No     Lack  "of Transportation (Non-Medical): No   Housing Stability: Unknown (6/12/2024)    Housing Stability Vital Sign     Unable to Pay for Housing in the Last Year: No     Homeless in the Last Year: No   Utilities: Not At Risk (6/12/2024)    [facility] Utilities     Threatened with loss of utilities: No     No results found.    Objective     /80 (BP Location: Left arm, Patient Position: Sitting, Cuff Size: Standard)   Pulse 86   Temp 98.1 °F (36.7 °C) (Temporal)   Resp 18   Ht 5' 4.5\" (1.638 m)   Wt 69.5 kg (153 lb 3.2 oz)   SpO2 100%   BMI 25.89 kg/m²     Physical Exam  Vitals and nursing note reviewed.   Constitutional:       General: She is not in acute distress.     Appearance: Normal appearance. She is well-developed and normal weight.   HENT:      Head: Normocephalic and atraumatic.      Right Ear: Tympanic membrane, ear canal and external ear normal.      Left Ear: Tympanic membrane, ear canal and external ear normal.      Nose:      Right Sinus: Maxillary sinus tenderness and frontal sinus tenderness present.      Left Sinus: Maxillary sinus tenderness and frontal sinus tenderness present.      Mouth/Throat:      Mouth: Mucous membranes are moist.      Pharynx: Oropharyngeal exudate present. No posterior oropharyngeal erythema.      Tonsils: No tonsillar exudate.   Eyes:      Extraocular Movements: Extraocular movements intact.      Conjunctiva/sclera: Conjunctivae normal.      Pupils: Pupils are equal, round, and reactive to light.   Cardiovascular:      Rate and Rhythm: Normal rate and regular rhythm.      Pulses: Normal pulses.      Heart sounds: Normal heart sounds. No murmur heard.  Pulmonary:      Effort: Pulmonary effort is normal. No respiratory distress.      Breath sounds: Normal breath sounds.   Abdominal:      General: Bowel sounds are normal.      Palpations: Abdomen is soft.      Tenderness: There is no abdominal tenderness.   Musculoskeletal:         General: No swelling.      Cervical back: " Normal range of motion and neck supple.   Skin:     General: Skin is warm and dry.      Capillary Refill: Capillary refill takes less than 2 seconds.   Neurological:      General: No focal deficit present.      Mental Status: She is alert and oriented to person, place, and time. Mental status is at baseline.   Psychiatric:         Mood and Affect: Mood normal.         Behavior: Behavior normal.         Thought Content: Thought content normal.         Judgment: Judgment normal.       Administrative Statements   I have spent a total time of 40 minutes on 06/12/24 In caring for this patient including Diagnostic results, Prognosis, Risks and benefits of tx options, Instructions for management, Patient and family education, Importance of tx compliance, Risk factor reductions, Impressions, Counseling / Coordination of care, Documenting in the medical record, Reviewing / ordering tests, medicine, procedures  , and Obtaining or reviewing history  .    Patient Instructions   Medicare Preventive Visit Patient Instructions  Thank you for completing your Welcome to Medicare Visit or Medicare Annual Wellness Visit today. Your next wellness visit will be due in one year (6/13/2025).  The screening/preventive services that you may require over the next 5-10 years are detailed below. Some tests may not apply to you based off risk factors and/or age. Screening tests ordered at today's visit but not completed yet may show as past due. Also, please note that scanned in results may not display below.  Preventive Screenings:  Service Recommendations Previous Testing/Comments   Colorectal Cancer Screening  * Colonoscopy    * Fecal Occult Blood Test (FOBT)/Fecal Immunochemical Test (FIT)  * Fecal DNA/Cologuard Test  * Flexible Sigmoidoscopy Age: 45-75 years old   Colonoscopy: every 10 years (may be performed more frequently if at higher risk)  OR  FOBT/FIT: every 1 year  OR  Cologuard: every 3 years  OR  Sigmoidoscopy: every 5  years  Screening may be recommended earlier than age 45 if at higher risk for colorectal cancer. Also, an individualized decision between you and your healthcare provider will decide whether screening between the ages of 76-85 would be appropriate. Colonoscopy: Not on file  FOBT/FIT: Not on file  Cologuard: Not on file  Sigmoidoscopy: Not on file    Risks and Benefits Discussed     Breast Cancer Screening Age: 40+ years old  Frequency: every 1-2 years  Not required if history of left and right mastectomy Mammogram: 06/21/2023    Screening Current   Cervical Cancer Screening Between the ages of 21-29, pap smear recommended once every 3 years.   Between the ages of 30-65, can perform pap smear with HPV co-testing every 5 years.   Recommendations may differ for women with a history of total hysterectomy, cervical cancer, or abnormal pap smears in past. Pap Smear: 12/26/2023    Screening Current   Hepatitis C Screening Once for adults born between 1945 and 1965  More frequently in patients at high risk for Hepatitis C Hep C Antibody: 10/28/2021    Screening Current   Diabetes Screening 1-2 times per year if you're at risk for diabetes or have pre-diabetes Fasting glucose: 91 mg/dL (11/29/2023)  A1C: 5.3 % (10/28/2021)  Screening Current   Cholesterol Screening Once every 5 years if you don't have a lipid disorder. May order more often based on risk factors. Lipid panel: 11/29/2023    Screening Not Indicated  History Lipid Disorder     Other Preventive Screenings Covered by Medicare:  Abdominal Aortic Aneurysm (AAA) Screening: covered once if your at risk. You're considered to be at risk if you have a family history of AAA.  Lung Cancer Screening: covers low dose CT scan once per year if you meet all of the following conditions: (1) Age 55-77; (2) No signs or symptoms of lung cancer; (3) Current smoker or have quit smoking within the last 15 years; (4) You have a tobacco smoking history of at least 20 pack years (packs  per day multiplied by number of years you smoked); (5) You get a written order from a healthcare provider.  Glaucoma Screening: covered annually if you're considered high risk: (1) You have diabetes OR (2) Family history of glaucoma OR (3)  aged 50 and older OR (4)  American aged 65 and older  Osteoporosis Screening: covered every 2 years if you meet one of the following conditions: (1) You're estrogen deficient and at risk for osteoporosis based off medical history and other findings; (2) Have a vertebral abnormality; (3) On glucocorticoid therapy for more than 3 months; (4) Have primary hyperparathyroidism; (5) On osteoporosis medications and need to assess response to drug therapy.   Last bone density test (DXA Scan): 05/02/2022.  HIV Screening: covered annually if you're between the age of 15-65. Also covered annually if you are younger than 15 and older than 65 with risk factors for HIV infection. For pregnant patients, it is covered up to 3 times per pregnancy.    Immunizations:  Immunization Recommendations   Influenza Vaccine Annual influenza vaccination during flu season is recommended for all persons aged >= 6 months who do not have contraindications   Pneumococcal Vaccine   * Pneumococcal conjugate vaccine = PCV13 (Prevnar 13), PCV15 (Vaxneuvance), PCV20 (Prevnar 20)  * Pneumococcal polysaccharide vaccine = PPSV23 (Pneumovax) Adults 19-63 yo with certain risk factors or if 65+ yo  If never received any pneumonia vaccine: recommend Prevnar 20 (PCV20)  Give PCV20 if previously received 1 dose of PCV13 or PPSV23   Hepatitis B Vaccine 3 dose series if at intermediate or high risk (ex: diabetes, end stage renal disease, liver disease)   Respiratory syncytial virus (RSV) Vaccine - COVERED BY MEDICARE PART D  * RSVPreF3 (Arexvy) CDC recommends that adults 60 years of age and older may receive a single dose of RSV vaccine using shared clinical decision-making (SCDM)   Tetanus (Td) Vaccine  - COST NOT COVERED BY MEDICARE PART B Following completion of primary series, a booster dose should be given every 10 years to maintain immunity against tetanus. Td may also be given as tetanus wound prophylaxis.   Tdap Vaccine - COST NOT COVERED BY MEDICARE PART B Recommended at least once for all adults. For pregnant patients, recommended with each pregnancy.   Shingles Vaccine (Shingrix) - COST NOT COVERED BY MEDICARE PART B  2 shot series recommended in those 19 years and older who have or will have weakened immune systems or those 50 years and older     Health Maintenance Due:      Topic Date Due    Colorectal Cancer Screening  Never done    Breast Cancer Screening: Mammogram  06/21/2024    Cervical Cancer Screening  12/26/2028    HIV Screening  Completed    Hepatitis C Screening  Completed     Immunizations Due:      Topic Date Due    COVID-19 Vaccine (5 - 2023-24 season) 09/01/2023    Influenza Vaccine (Season Ended) 09/01/2024     Advance Directives   What are advance directives?  Advance directives are legal documents that state your wishes and plans for medical care. These plans are made ahead of time in case you lose your ability to make decisions for yourself. Advance directives can apply to any medical decision, such as the treatments you want, and if you want to donate organs.   What are the types of advance directives?  There are many types of advance directives, and each state has rules about how to use them. You may choose a combination of any of the following:  Living will:  This is a written record of the treatment you want. You can also choose which treatments you do not want, which to limit, and which to stop at a certain time. This includes surgery, medicine, IV fluid, and tube feedings.   Durable power of  for healthcare (DPAHC):  This is a written record that states who you want to make healthcare choices for you when you are unable to make them for yourself. This person, called narseen  proxy, is usually a family member or a friend. You may choose more than 1 proxy.  Do not resuscitate (DNR) order:  A DNR order is used in case your heart stops beating or you stop breathing. It is a request not to have certain forms of treatment, such as CPR. A DNR order may be included in other types of advance directives.  Medical directive:  This covers the care that you want if you are in a coma, near death, or unable to make decisions for yourself. You can list the treatments you want for each condition. Treatment may include pain medicine, surgery, blood transfusions, dialysis, IV or tube feedings, and a ventilator (breathing machine).  Values history:  This document has questions about your views, beliefs, and how you feel and think about life. This information can help others choose the care that you would choose.  Why are advance directives important?  An advance directive helps you control your care. Although spoken wishes may be used, it is better to have your wishes written down. Spoken wishes can be misunderstood, or not followed. Treatments may be given even if you do not want them. An advance directive may make it easier for your family to make difficult choices about your care.   Weight Management   Why it is important to manage your weight:  Being overweight increases your risk of health conditions such as heart disease, high blood pressure, type 2 diabetes, and certain types of cancer. It can also increase your risk for osteoarthritis, sleep apnea, and other respiratory problems. Aim for a slow, steady weight loss. Even a small amount of weight loss can lower your risk of health problems.  How to lose weight safely:  A safe and healthy way to lose weight is to eat fewer calories and get regular exercise. You can lose up about 1 pound a week by decreasing the number of calories you eat by 500 calories each day.   Healthy meal plan for weight management:  A healthy meal plan includes a variety of  foods, contains fewer calories, and helps you stay healthy. A healthy meal plan includes the following:  Eat whole-grain foods more often.  A healthy meal plan should contain fiber. Fiber is the part of grains, fruits, and vegetables that is not broken down by your body. Whole-grain foods are healthy and provide extra fiber in your diet. Some examples of whole-grain foods are whole-wheat breads and pastas, oatmeal, brown rice, and bulgur.  Eat a variety of vegetables every day.  Include dark, leafy greens such as spinach, kale, heena greens, and mustard greens. Eat yellow and orange vegetables such as carrots, sweet potatoes, and winter squash.   Eat a variety of fruits every day.  Choose fresh or canned fruit (canned in its own juice or light syrup) instead of juice. Fruit juice has very little or no fiber.  Eat low-fat dairy foods.  Drink fat-free (skim) milk or 1% milk. Eat fat-free yogurt and low-fat cottage cheese. Try low-fat cheeses such as mozzarella and other reduced-fat cheeses.  Choose meat and other protein foods that are low in fat.  Choose beans or other legumes such as split peas or lentils. Choose fish, skinless poultry (chicken or turkey), or lean cuts of red meat (beef or pork). Before you cook meat or poultry, cut off any visible fat.   Use less fat and oil.  Try baking foods instead of frying them. Add less fat, such as margarine, sour cream, regular salad dressing and mayonnaise to foods. Eat fewer high-fat foods. Some examples of high-fat foods include french fries, doughnuts, ice cream, and cakes.  Eat fewer sweets.  Limit foods and drinks that are high in sugar. This includes candy, cookies, regular soda, and sweetened drinks.  Exercise:  Exercise at least 30 minutes per day on most days of the week. Some examples of exercise include walking, biking, dancing, and swimming. You can also fit in more physical activity by taking the stairs instead of the elevator or parking farther away from  stores. Ask your healthcare provider about the best exercise plan for you.      © Copyright [employer] 2018 Information is for End User's use only and may not be sold, redistributed or otherwise used for commercial purposes. All illustrations and images included in CareNotes® are the copyrighted property of [employer] or [employer]

## 2024-06-12 NOTE — PATIENT INSTRUCTIONS
Medicare Preventive Visit Patient Instructions  Thank you for completing your Welcome to Medicare Visit or Medicare Annual Wellness Visit today. Your next wellness visit will be due in one year (6/13/2025).  The screening/preventive services that you may require over the next 5-10 years are detailed below. Some tests may not apply to you based off risk factors and/or age. Screening tests ordered at today's visit but not completed yet may show as past due. Also, please note that scanned in results may not display below.  Preventive Screenings:  Service Recommendations Previous Testing/Comments   Colorectal Cancer Screening  * Colonoscopy    * Fecal Occult Blood Test (FOBT)/Fecal Immunochemical Test (FIT)  * Fecal DNA/Cologuard Test  * Flexible Sigmoidoscopy Age: 45-75 years old   Colonoscopy: every 10 years (may be performed more frequently if at higher risk)  OR  FOBT/FIT: every 1 year  OR  Cologuard: every 3 years  OR  Sigmoidoscopy: every 5 years  Screening may be recommended earlier than age 45 if at higher risk for colorectal cancer. Also, an individualized decision between you and your healthcare provider will decide whether screening between the ages of 76-85 would be appropriate. Colonoscopy: Not on file  FOBT/FIT: Not on file  Cologuard: Not on file  Sigmoidoscopy: Not on file    Risks and Benefits Discussed     Breast Cancer Screening Age: 40+ years old  Frequency: every 1-2 years  Not required if history of left and right mastectomy Mammogram: 06/21/2023    Screening Current   Cervical Cancer Screening Between the ages of 21-29, pap smear recommended once every 3 years.   Between the ages of 30-65, can perform pap smear with HPV co-testing every 5 years.   Recommendations may differ for women with a history of total hysterectomy, cervical cancer, or abnormal pap smears in past. Pap Smear: 12/26/2023    Screening Current   Hepatitis C Screening Once for adults born between 1945 and 1965  More frequently in  patients at high risk for Hepatitis C Hep C Antibody: 10/28/2021    Screening Current   Diabetes Screening 1-2 times per year if you're at risk for diabetes or have pre-diabetes Fasting glucose: 91 mg/dL (11/29/2023)  A1C: 5.3 % (10/28/2021)  Screening Current   Cholesterol Screening Once every 5 years if you don't have a lipid disorder. May order more often based on risk factors. Lipid panel: 11/29/2023    Screening Not Indicated  History Lipid Disorder     Other Preventive Screenings Covered by Medicare:  Abdominal Aortic Aneurysm (AAA) Screening: covered once if your at risk. You're considered to be at risk if you have a family history of AAA.  Lung Cancer Screening: covers low dose CT scan once per year if you meet all of the following conditions: (1) Age 55-77; (2) No signs or symptoms of lung cancer; (3) Current smoker or have quit smoking within the last 15 years; (4) You have a tobacco smoking history of at least 20 pack years (packs per day multiplied by number of years you smoked); (5) You get a written order from a healthcare provider.  Glaucoma Screening: covered annually if you're considered high risk: (1) You have diabetes OR (2) Family history of glaucoma OR (3)  aged 50 and older OR (4)  American aged 65 and older  Osteoporosis Screening: covered every 2 years if you meet one of the following conditions: (1) You're estrogen deficient and at risk for osteoporosis based off medical history and other findings; (2) Have a vertebral abnormality; (3) On glucocorticoid therapy for more than 3 months; (4) Have primary hyperparathyroidism; (5) On osteoporosis medications and need to assess response to drug therapy.   Last bone density test (DXA Scan): 05/02/2022.  HIV Screening: covered annually if you're between the age of 15-65. Also covered annually if you are younger than 15 and older than 65 with risk factors for HIV infection. For pregnant patients, it is covered up to 3 times  per pregnancy.    Immunizations:  Immunization Recommendations   Influenza Vaccine Annual influenza vaccination during flu season is recommended for all persons aged >= 6 months who do not have contraindications   Pneumococcal Vaccine   * Pneumococcal conjugate vaccine = PCV13 (Prevnar 13), PCV15 (Vaxneuvance), PCV20 (Prevnar 20)  * Pneumococcal polysaccharide vaccine = PPSV23 (Pneumovax) Adults 19-63 yo with certain risk factors or if 65+ yo  If never received any pneumonia vaccine: recommend Prevnar 20 (PCV20)  Give PCV20 if previously received 1 dose of PCV13 or PPSV23   Hepatitis B Vaccine 3 dose series if at intermediate or high risk (ex: diabetes, end stage renal disease, liver disease)   Respiratory syncytial virus (RSV) Vaccine - COVERED BY MEDICARE PART D  * RSVPreF3 (Arexvy) CDC recommends that adults 60 years of age and older may receive a single dose of RSV vaccine using shared clinical decision-making (SCDM)   Tetanus (Td) Vaccine - COST NOT COVERED BY MEDICARE PART B Following completion of primary series, a booster dose should be given every 10 years to maintain immunity against tetanus. Td may also be given as tetanus wound prophylaxis.   Tdap Vaccine - COST NOT COVERED BY MEDICARE PART B Recommended at least once for all adults. For pregnant patients, recommended with each pregnancy.   Shingles Vaccine (Shingrix) - COST NOT COVERED BY MEDICARE PART B  2 shot series recommended in those 19 years and older who have or will have weakened immune systems or those 50 years and older     Health Maintenance Due:      Topic Date Due   • Colorectal Cancer Screening  Never done   • Breast Cancer Screening: Mammogram  06/21/2024   • Cervical Cancer Screening  12/26/2028   • HIV Screening  Completed   • Hepatitis C Screening  Completed     Immunizations Due:      Topic Date Due   • COVID-19 Vaccine (5 - 2023-24 season) 09/01/2023   • Influenza Vaccine (Season Ended) 09/01/2024     Advance Directives   What are  advance directives?  Advance directives are legal documents that state your wishes and plans for medical care. These plans are made ahead of time in case you lose your ability to make decisions for yourself. Advance directives can apply to any medical decision, such as the treatments you want, and if you want to donate organs.   What are the types of advance directives?  There are many types of advance directives, and each state has rules about how to use them. You may choose a combination of any of the following:  Living will:  This is a written record of the treatment you want. You can also choose which treatments you do not want, which to limit, and which to stop at a certain time. This includes surgery, medicine, IV fluid, and tube feedings.   Durable power of  for healthcare (DPAHC):  This is a written record that states who you want to make healthcare choices for you when you are unable to make them for yourself. This person, called a proxy, is usually a family member or a friend. You may choose more than 1 proxy.  Do not resuscitate (DNR) order:  A DNR order is used in case your heart stops beating or you stop breathing. It is a request not to have certain forms of treatment, such as CPR. A DNR order may be included in other types of advance directives.  Medical directive:  This covers the care that you want if you are in a coma, near death, or unable to make decisions for yourself. You can list the treatments you want for each condition. Treatment may include pain medicine, surgery, blood transfusions, dialysis, IV or tube feedings, and a ventilator (breathing machine).  Values history:  This document has questions about your views, beliefs, and how you feel and think about life. This information can help others choose the care that you would choose.  Why are advance directives important?  An advance directive helps you control your care. Although spoken wishes may be used, it is better to have your  wishes written down. Spoken wishes can be misunderstood, or not followed. Treatments may be given even if you do not want them. An advance directive may make it easier for your family to make difficult choices about your care.   Weight Management   Why it is important to manage your weight:  Being overweight increases your risk of health conditions such as heart disease, high blood pressure, type 2 diabetes, and certain types of cancer. It can also increase your risk for osteoarthritis, sleep apnea, and other respiratory problems. Aim for a slow, steady weight loss. Even a small amount of weight loss can lower your risk of health problems.  How to lose weight safely:  A safe and healthy way to lose weight is to eat fewer calories and get regular exercise. You can lose up about 1 pound a week by decreasing the number of calories you eat by 500 calories each day.   Healthy meal plan for weight management:  A healthy meal plan includes a variety of foods, contains fewer calories, and helps you stay healthy. A healthy meal plan includes the following:  Eat whole-grain foods more often.  A healthy meal plan should contain fiber. Fiber is the part of grains, fruits, and vegetables that is not broken down by your body. Whole-grain foods are healthy and provide extra fiber in your diet. Some examples of whole-grain foods are whole-wheat breads and pastas, oatmeal, brown rice, and bulgur.  Eat a variety of vegetables every day.  Include dark, leafy greens such as spinach, kale, heena greens, and mustard greens. Eat yellow and orange vegetables such as carrots, sweet potatoes, and winter squash.   Eat a variety of fruits every day.  Choose fresh or canned fruit (canned in its own juice or light syrup) instead of juice. Fruit juice has very little or no fiber.  Eat low-fat dairy foods.  Drink fat-free (skim) milk or 1% milk. Eat fat-free yogurt and low-fat cottage cheese. Try low-fat cheeses such as mozzarella and other  reduced-fat cheeses.  Choose meat and other protein foods that are low in fat.  Choose beans or other legumes such as split peas or lentils. Choose fish, skinless poultry (chicken or turkey), or lean cuts of red meat (beef or pork). Before you cook meat or poultry, cut off any visible fat.   Use less fat and oil.  Try baking foods instead of frying them. Add less fat, such as margarine, sour cream, regular salad dressing and mayonnaise to foods. Eat fewer high-fat foods. Some examples of high-fat foods include french fries, doughnuts, ice cream, and cakes.  Eat fewer sweets.  Limit foods and drinks that are high in sugar. This includes candy, cookies, regular soda, and sweetened drinks.  Exercise:  Exercise at least 30 minutes per day on most days of the week. Some examples of exercise include walking, biking, dancing, and swimming. You can also fit in more physical activity by taking the stairs instead of the elevator or parking farther away from stores. Ask your healthcare provider about the best exercise plan for you.      © Copyright [employer] 2018 Information is for End User's use only and may not be sold, redistributed or otherwise used for commercial purposes. All illustrations and images included in CareNotes® are the copyrighted property of [employer] or [employer]

## 2024-06-12 NOTE — ASSESSMENT & PLAN NOTE
Headache/migraine treatment:   Abortive medications (for immediate treatment of a headache): Ok to take ibuprofen or acetaminophen for headaches, but try to limit the amount and frequency that you are taking to avoid medication overuse/rebound headache. Ideally no more than 2-3 days per week.    Lifestyle Recommendations:  - Maintain good sleep hygiene.  Going to bed and waking up at consistent times, avoiding excessive daytime naps, avoiding caffeinated beverages in the evening, avoid excessive stimulation in the evening and generally using bed primarily for sleeping.  One hour before bedtime would recommend turning lights down lower, decreasing your activity (may read quietly, listen to music at a low volume). When you get into bed, should eliminate all technology (no texting, emailing, playing with your phone, iPad or tablet in bed).  - Maintain good hydration. Drink  2L of fluid a day (4 typical small water bottles)  - Maintain good nutrition. In particular don't skip meals and eat balanced meals regularly.  -Maintain proper exercise 150 minutes/week

## 2024-06-12 NOTE — ASSESSMENT & PLAN NOTE
11/23 Last TSH 2.389  Current medication: Levothyroxine 50mcg  Continue current medication regimen

## 2024-08-06 ENCOUNTER — NURSE TRIAGE (OUTPATIENT)
Age: 49
End: 2024-08-06

## 2024-08-06 NOTE — TELEPHONE ENCOUNTER
"Received call from patient stating that she had an episode of fainting on Sunday 8/4 while she was in the shower at her boyfriends house.  Unsure how long she was unconscious, thinks it was a couple of minutes.  Denies any injury from the fall.  Since then, she has had episodes of dizziness and lightheadedness but no further fainting.  Reports headaches more frequently than usual.  Has been taking Imitrex but this has not been helping.  She has not checked her BP.  She is wanting to schedule an office appointment for today.  Discussed with her available times for today and option of going to urgent care if those times do not work for her.  She is unsure what time will work for her schedule today and states that she will call back to schedule an appointment.      Reason for Disposition   Patient wants to be seen    Answer Assessment - Initial Assessment Questions  1. ONSET: \"How long were you unconscious?\" (minutes) \"When did it happen?\"      Passed out Sunday 8/4 at her boyfriend's house in the shower. States that she was out \"a couple of minutes\"  2. CONTENT: \"What happened during period of unconsciousness?\" (e.g., seizure activity)       Unsure  3. MENTAL STATUS: \"Alert and oriented now?\" (oriented x 3 = name, month, location)       Yes  4. TRIGGER: \"What do you think caused the fainting?\" \"What were you doing just before you fainted?\"  (e.g., exercise, sudden standing up, prolonged standing)      Was in the shower  5. RECURRENT SYMPTOM: \"Have you ever passed out before?\" If Yes, ask: \"When was the last time?\" and \"What happened that time?\"       Has had this happen a couple of years ago  6. INJURY: \"Did you sustain any injury during the fall?\"       Denies  7. CARDIAC SYMPTOMS: \"Have you had any of the following symptoms: chest pain, difficulty breathing, palpitations?\"      Denies  8. NEUROLOGIC SYMPTOMS: \"Have you had any of the following symptoms: headache, numbness, vertigo, weakness?\"      Headaches more " "than usual, taking migraine medication but not helping  Having increased lightheadedness, dizziness  9. GI SYMPTOMS: \"Have you had any of the following symptoms: abdominal pain, vomiting, diarrhea, blood in stools?\"      States that she feels slightly nauseous before passing out  10. OTHER SYMPTOMS: \"Do you have any other symptoms?\"        Denies  11. PREGNANCY: \"Is there any chance you are pregnant?\" \"When was your last menstrual period?\"        Denies    Protocols used: Fainting-ADULT-[place]    "

## 2024-08-07 ENCOUNTER — OFFICE VISIT (OUTPATIENT)
Dept: FAMILY MEDICINE CLINIC | Facility: CLINIC | Age: 49
End: 2024-08-07
Payer: COMMERCIAL

## 2024-08-07 VITALS
HEART RATE: 44 BPM | DIASTOLIC BLOOD PRESSURE: 70 MMHG | BODY MASS INDEX: 26.29 KG/M2 | HEIGHT: 64 IN | RESPIRATION RATE: 18 BRPM | OXYGEN SATURATION: 100 % | TEMPERATURE: 98.3 F | WEIGHT: 154 LBS | SYSTOLIC BLOOD PRESSURE: 120 MMHG

## 2024-08-07 DIAGNOSIS — R55 SYNCOPE, UNSPECIFIED SYNCOPE TYPE: Primary | ICD-10-CM

## 2024-08-07 DIAGNOSIS — E03.8 OTHER SPECIFIED HYPOTHYROIDISM: ICD-10-CM

## 2024-08-07 PROCEDURE — 99214 OFFICE O/P EST MOD 30 MIN: CPT

## 2024-08-07 PROCEDURE — 93000 ELECTROCARDIOGRAM COMPLETE: CPT

## 2024-08-07 RX ORDER — MECLIZINE HCL 12.5 MG/1
12.5 TABLET ORAL 3 TIMES DAILY PRN
Qty: 30 TABLET | Refills: 0 | Status: SHIPPED | OUTPATIENT
Start: 2024-08-07

## 2024-08-07 NOTE — ASSESSMENT & PLAN NOTE
Ddx include cardio vs anemia vs vertigo   [place] Hallpike positive   Start Meclizine   Do lab work   ECHO   EKG showed normal sinus rhythm

## 2024-08-07 NOTE — PROGRESS NOTES
Ambulatory Visit  Name: Kaitlynn Richards      : 1975      MRN: [id number]  Encounter Provider: Avelino Mccullough MD  Encounter Date: 2024   Encounter department: [facility]    Assessment & Plan   1. Syncope, unspecified syncope type  Assessment & Plan:  Ddx include cardio vs anemia vs vertigo   [place] Hallpike positive   Start Meclizine   Do lab work   ECHO   EKG showed normal sinus rhythm   Orders:  -     CBC and differential; Future  -     Comprehensive metabolic panel; Future  -     Echo complete w/ contrast if indicated; Future; Expected date: 2024  -     POCT ECG  -     UA w Reflex to Microscopic w Reflex to Culture; Future  -     meclizine (ANTIVERT) 12.5 MG tablet; Take 1 tablet (12.5 mg total) by mouth 3 (three) times a day as needed for dizziness  -     Ambulatory Referral to Nutrition Services; Future  2. BMI 25.0-25.9,adult  -     Ambulatory Referral to Nutrition Services; Future  3. Other specified hypothyroidism  -     TSH, 3rd generation with Free T4 reflex; Future       History of Present Illness     Kaitlynn Richards is a 48 y.o. female with pmhx of hypothyroidism presenting today presyncopal episodes     Has happened before but she has is back       Dizziness  This is a recurrent problem. The current episode started 1 to 4 weeks ago. The problem has been waxing and waning. Associated symptoms include diaphoresis, vertigo and weakness. Pertinent negatives include no abdominal pain, anorexia, arthralgias, change in bowel habit, chest pain, chills, congestion, fatigue or headaches. She has tried nothing for the symptoms.       Review of Systems   Constitutional:  Positive for diaphoresis. Negative for chills and fatigue.   HENT:  Negative for congestion and ear pain.    Eyes:  Negative for pain and visual disturbance.   Respiratory:  Negative for shortness of breath.    Cardiovascular:  Negative for chest pain.   Gastrointestinal:  Negative for abdominal pain,  "anorexia and change in bowel habit.   Musculoskeletal:  Negative for arthralgias.   Skin:  Negative for color change.   Neurological:  Positive for vertigo and weakness. Negative for headaches.   All other systems reviewed and are negative.      Objective   /70 (BP Location: Left arm, Patient Position: Sitting, Cuff Size: Standard)   Pulse (!) 44   Temp 98.3 °F (36.8 °C) (Temporal)   Resp 18   Ht 5' 4\" (1.626 m)   Wt 69.9 kg (154 lb)   SpO2 100%   BMI 26.43 kg/m²     Physical Exam  Vitals and nursing note reviewed.   Constitutional:       General: She is not in acute distress.     Appearance: Normal appearance. She is well-developed.   HENT:      Head: Normocephalic and atraumatic.      Comments: [place] Hallpike maneuver positive   Eyes:      Conjunctiva/sclera: Conjunctivae normal.   Cardiovascular:      Rate and Rhythm: Normal rate and regular rhythm.      Heart sounds: No murmur heard.     No friction rub. No gallop.   Pulmonary:      Effort: Pulmonary effort is normal. No respiratory distress.      Breath sounds: Normal breath sounds.   Abdominal:      Palpations: Abdomen is soft.      Tenderness: There is no abdominal tenderness.   Musculoskeletal:         General: No swelling.      Cervical back: Neck supple.   Skin:     General: Skin is warm and dry.      Capillary Refill: Capillary refill takes less than 2 seconds.   Neurological:      General: No focal deficit present.      Mental Status: She is alert and oriented to person, place, and time.   Psychiatric:         Mood and Affect: Mood normal.           "

## 2024-08-08 ENCOUNTER — APPOINTMENT (OUTPATIENT)
Dept: LAB | Facility: HOSPITAL | Age: 49
End: 2024-08-08
Payer: COMMERCIAL

## 2024-08-08 DIAGNOSIS — E03.8 OTHER SPECIFIED HYPOTHYROIDISM: ICD-10-CM

## 2024-08-08 DIAGNOSIS — R55 SYNCOPE, UNSPECIFIED SYNCOPE TYPE: ICD-10-CM

## 2024-08-08 LAB
ALBUMIN SERPL BCG-MCNC: 3.9 G/DL (ref 3.5–5)
ALP SERPL-CCNC: 44 U/L (ref 34–104)
ALT SERPL W P-5'-P-CCNC: 10 U/L (ref 7–52)
ANION GAP SERPL CALCULATED.3IONS-SCNC: 8 MMOL/L (ref 4–13)
AST SERPL W P-5'-P-CCNC: 13 U/L (ref 13–39)
BACTERIA UR QL AUTO: ABNORMAL /HPF
BASOPHILS # BLD AUTO: 0.06 THOUSANDS/ÂΜL (ref 0–0.1)
BASOPHILS NFR BLD AUTO: 1 % (ref 0–1)
BILIRUB SERPL-MCNC: 0.47 MG/DL (ref 0.2–1)
BILIRUB UR QL STRIP: NEGATIVE
BUN SERPL-MCNC: 16 MG/DL (ref 5–25)
CALCIUM SERPL-MCNC: 9.2 MG/DL (ref 8.4–10.2)
CHLORIDE SERPL-SCNC: 107 MMOL/L (ref 96–108)
CLARITY UR: CLEAR
CO2 SERPL-SCNC: 26 MMOL/L (ref 21–32)
COLOR UR: ABNORMAL
CREAT SERPL-MCNC: 0.86 MG/DL (ref 0.6–1.3)
EOSINOPHIL # BLD AUTO: 0.12 THOUSAND/ÂΜL (ref 0–0.61)
EOSINOPHIL NFR BLD AUTO: 2 % (ref 0–6)
ERYTHROCYTE [DISTWIDTH] IN BLOOD BY AUTOMATED COUNT: 12.8 % (ref 11.6–15.1)
GFR SERPL CREATININE-BSD FRML MDRD: 80 ML/MIN/1.73SQ M
GLUCOSE P FAST SERPL-MCNC: 68 MG/DL (ref 65–99)
GLUCOSE UR STRIP-MCNC: NEGATIVE MG/DL
HCT VFR BLD AUTO: 38.8 % (ref 34.8–46.1)
HGB BLD-MCNC: 12.7 G/DL (ref 11.5–15.4)
HGB UR QL STRIP.AUTO: NEGATIVE
IMM GRANULOCYTES # BLD AUTO: 0.02 THOUSAND/UL (ref 0–0.2)
IMM GRANULOCYTES NFR BLD AUTO: 0 % (ref 0–2)
KETONES UR STRIP-MCNC: NEGATIVE MG/DL
LEUKOCYTE ESTERASE UR QL STRIP: NEGATIVE
LYMPHOCYTES # BLD AUTO: 2.15 THOUSANDS/ÂΜL (ref 0.6–4.47)
LYMPHOCYTES NFR BLD AUTO: 30 % (ref 14–44)
MCH RBC QN AUTO: 28.5 PG (ref 26.8–34.3)
MCHC RBC AUTO-ENTMCNC: 32.7 G/DL (ref 31.4–37.4)
MCV RBC AUTO: 87 FL (ref 82–98)
MONOCYTES # BLD AUTO: 0.45 THOUSAND/ÂΜL (ref 0.17–1.22)
MONOCYTES NFR BLD AUTO: 6 % (ref 4–12)
MUCOUS THREADS UR QL AUTO: ABNORMAL
NEUTROPHILS # BLD AUTO: 4.47 THOUSANDS/ÂΜL (ref 1.85–7.62)
NEUTS SEG NFR BLD AUTO: 61 % (ref 43–75)
NITRITE UR QL STRIP: NEGATIVE
NON-SQ EPI CELLS URNS QL MICRO: ABNORMAL /HPF
NRBC BLD AUTO-RTO: 0 /100 WBCS
PH UR STRIP.AUTO: 5 [PH]
PLATELET # BLD AUTO: 323 THOUSANDS/UL (ref 149–390)
PMV BLD AUTO: 10.6 FL (ref 8.9–12.7)
POTASSIUM SERPL-SCNC: 4 MMOL/L (ref 3.5–5.3)
PROT SERPL-MCNC: 6.9 G/DL (ref 6.4–8.4)
PROT UR STRIP-MCNC: ABNORMAL MG/DL
RBC # BLD AUTO: 4.46 MILLION/UL (ref 3.81–5.12)
RBC #/AREA URNS AUTO: ABNORMAL /HPF
SODIUM SERPL-SCNC: 141 MMOL/L (ref 135–147)
SP GR UR STRIP.AUTO: 1.02 (ref 1–1.04)
TSH SERPL DL<=0.05 MIU/L-ACNC: 1.11 UIU/ML (ref 0.45–4.5)
UROBILINOGEN UA: NEGATIVE MG/DL
WBC # BLD AUTO: 7.27 THOUSAND/UL (ref 4.31–10.16)
WBC #/AREA URNS AUTO: ABNORMAL /HPF

## 2024-08-08 PROCEDURE — 80053 COMPREHEN METABOLIC PANEL: CPT

## 2024-08-08 PROCEDURE — 36415 COLL VENOUS BLD VENIPUNCTURE: CPT

## 2024-08-08 PROCEDURE — 85025 COMPLETE CBC W/AUTO DIFF WBC: CPT

## 2024-08-08 PROCEDURE — 84443 ASSAY THYROID STIM HORMONE: CPT

## 2024-08-08 PROCEDURE — 81003 URINALYSIS AUTO W/O SCOPE: CPT

## 2024-08-08 PROCEDURE — 81001 URINALYSIS AUTO W/SCOPE: CPT

## 2024-08-12 ENCOUNTER — TELEPHONE (OUTPATIENT)
Dept: FAMILY MEDICINE CLINIC | Facility: CLINIC | Age: 49
End: 2024-08-12

## 2024-08-12 NOTE — TELEPHONE ENCOUNTER
Patient states she is still feeling light headed since yesterday and the Meclizine is not working. She said she was told to call the office if this happens.    Please advise

## 2024-08-16 DIAGNOSIS — Z30.019 ENCOUNTER FOR FEMALE BIRTH CONTROL: ICD-10-CM

## 2024-08-16 RX ORDER — NORETHINDRONE ACETATE AND ETHINYL ESTRADIOL AND FERROUS FUMARATE 1MG-20(21)
KIT ORAL
Qty: 112 TABLET | Refills: 1 | Status: SHIPPED | OUTPATIENT
Start: 2024-08-16

## 2024-09-05 ENCOUNTER — TELEPHONE (OUTPATIENT)
Age: 49
End: 2024-09-05

## 2024-10-01 ENCOUNTER — HOSPITAL ENCOUNTER (OUTPATIENT)
Dept: NON INVASIVE DIAGNOSTICS | Facility: HOSPITAL | Age: 49
Discharge: HOME | End: 2024-10-01
Payer: COMMERCIAL

## 2024-10-01 ENCOUNTER — TELEPHONE (OUTPATIENT)
Dept: FAMILY MEDICINE CLINIC | Facility: CLINIC | Age: 49
End: 2024-10-01

## 2024-10-01 VITALS
WEIGHT: 154 LBS | HEIGHT: 64 IN | BODY MASS INDEX: 26.29 KG/M2 | SYSTOLIC BLOOD PRESSURE: 120 MMHG | HEART RATE: 50 BPM | DIASTOLIC BLOOD PRESSURE: 70 MMHG

## 2024-10-01 DIAGNOSIS — R55 SYNCOPE, UNSPECIFIED SYNCOPE TYPE: ICD-10-CM

## 2024-10-01 LAB
AORTIC ROOT: 3.1 CM
APICAL FOUR CHAMBER EJECTION FRACTION: 70 %
ASCENDING AORTA: 2.6 CM
BSA FOR ECHO PROCEDURE: 1.75 M2
E WAVE DECELERATION TIME: 220 MS
E/A RATIO: 2.08
FRACTIONAL SHORTENING: 31 (ref 28–44)
INTERVENTRICULAR SEPTUM IN DIASTOLE (PARASTERNAL SHORT AXIS VIEW): 1.1 CM
INTERVENTRICULAR SEPTUM: 1.1 CM (ref 0.6–1.1)
IVC: 28 MM
LAAS-AP2: 16.6 CM2
LAAS-AP4: 14.4 CM2
LEFT ATRIUM SIZE: 3.4 CM
LEFT ATRIUM VOLUME (MOD BIPLANE): 39 ML
LEFT ATRIUM VOLUME INDEX (MOD BIPLANE): 22.3 ML/M2
LEFT INTERNAL DIMENSION IN SYSTOLE: 2.9 CM (ref 2.1–4)
LEFT VENTRICULAR INTERNAL DIMENSION IN DIASTOLE: 4.2 CM (ref 3.5–6)
LEFT VENTRICULAR POSTERIOR WALL IN END DIASTOLE: 1 CM
LEFT VENTRICULAR STROKE VOLUME: 47 ML
LVSV (TEICH): 47 ML
MV E'TISSUE VEL-LAT: 15 CM/S
MV E'TISSUE VEL-SEP: 10 CM/S
MV PEAK A VEL: 0.52 M/S
MV PEAK E VEL: 108 CM/S
MV STENOSIS PRESSURE HALF TIME: 64 MS
MV VALVE AREA P 1/2 METHOD: 3.44
RA PRESSURE ESTIMATED: 12 MMHG
RIGHT ATRIUM AREA SYSTOLE A4C: 12.9 CM2
RIGHT VENTRICLE ID DIMENSION: 4 CM
RV PSP: 28 MMHG
SL CV LEFT ATRIUM LENGTH A2C: 5.4 CM
SL CV LV EF: 65
SL CV PED ECHO LEFT VENTRICLE DIASTOLIC VOLUME (MOD BIPLANE) 2D: 78 ML
SL CV PED ECHO LEFT VENTRICLE SYSTOLIC VOLUME (MOD BIPLANE) 2D: 31 ML
TR MAX PG: 16 MMHG
TR PEAK VELOCITY: 2 M/S
TRICUSPID ANNULAR PLANE SYSTOLIC EXCURSION: 1.9 CM
TRICUSPID VALVE PEAK REGURGITATION VELOCITY: 2.02 M/S

## 2024-10-01 PROCEDURE — 93306 TTE W/DOPPLER COMPLETE: CPT

## 2024-10-01 PROCEDURE — 93306 TTE W/DOPPLER COMPLETE: CPT | Performed by: INTERNAL MEDICINE

## 2024-10-16 DIAGNOSIS — Q21.12 PATENT FORAMEN OVALE: ICD-10-CM

## 2024-10-16 DIAGNOSIS — R55 PRE-SYNCOPE: ICD-10-CM

## 2024-10-16 DIAGNOSIS — R42 EPISODIC LIGHTHEADEDNESS: Primary | ICD-10-CM

## 2024-10-23 ENCOUNTER — CLINICAL SUPPORT (OUTPATIENT)
Dept: NUTRITION | Facility: HOSPITAL | Age: 49
End: 2024-10-23
Payer: COMMERCIAL

## 2024-10-23 DIAGNOSIS — R55 SYNCOPE, UNSPECIFIED SYNCOPE TYPE: ICD-10-CM

## 2024-10-23 PROCEDURE — 97802 MEDICAL NUTRITION INDIV IN: CPT | Performed by: DIETITIAN, REGISTERED

## 2024-10-23 NOTE — PROGRESS NOTES
Initial Nutrition Assessment Form    Patient Name: Kaitlynn Richards    YOB: 1975    Sex: Female     Assessment Date: 10/23/2024  Start Time: 11:20 Stop Time: 12:30 Total Minutes: 60     Data:  Present at session: self   Patient Concerns: Maintaining weight, or gradual weight loss.    Medical Dx/Reason for Referral: Z68.25 BMI 25-25.9 in adult   Past Medical History:   Diagnosis Date    Allergies     Bipolar II disorder ([facility]) 08/12/2015    Class 3 severe obesity due to excess calories without serious comorbidity with body mass index (BMI) of 40.0 to 44.9 in adult ([facility]) 01/02/2020    Disease of thyroid gland     Elevated cholesterol 12/22/2022    GERD (gastroesophageal reflux disease)     Primary osteoarthritis of left knee 01/13/2020    UTI (urinary tract infection)        Current Outpatient Medications   Medication Sig Dispense Refill    Aspirin-Acetaminophen-Caffeine (EXCEDRIN PO) Take by mouth if needed      Aurovela FE 1/20 1-20 MG-MCG per tablet TAKE 1 TABLET BY MOUTH DAILY TAKE 1 ACTIVE TABLET FOR 84 DAYS THEN THE INACTIVE FOR 7 DAYS 112 tablet 1    calcium carbonate (OS-AUGIE) 600 MG tablet every 24 hours (Patient not taking: Reported on 6/12/2024)      cetirizine (ZyrTEC) 10 mg tablet TAKE 1 TABLET BY MOUTH EVERY DAY 90 tablet 1    Cholecalciferol 5000 units TABS take 1 capsule by oral route  every day start after completing weekly dose (Patient not taking: Reported on 6/12/2024)      fexofenadine (ALLEGRA) 60 MG tablet Take 1 tablet (60 mg total) by mouth daily 90 tablet 0    ibuprofen (MOTRIN) 600 mg tablet Take 1 tablet (600 mg total) by mouth every 6 (six) hours as needed for mild pain 30 tablet 0    levothyroxine 50 mcg tablet Take 1 tablet (50 mcg total) by mouth daily 90 tablet 3    meclizine (ANTIVERT) 12.5 MG tablet Take 1 tablet (12.5 mg total) by mouth 3 (three) times a day as needed for dizziness 30 tablet 0    omeprazole (PriLOSEC) 40 MG capsule Take 1 capsule (40 mg total) by mouth  "daily (Patient taking differently: Take 40 mg by mouth every evening) 90 capsule 3    SUMAtriptan (IMITREX) 50 mg tablet Take 1 tablet (50 mg total) by mouth once as needed for migraine for up to 30 doses 30 tablet 0     No current facility-administered medications for this visit.        Additional Meds/Supplements: none   Special Learning Needs: Pt said she has some comprehension learning problems.    Height: Ht Readings from Last 5 Encounters:   10/01/24 5' 4\" (1.626 m)   08/07/24 5' 4\" (1.626 m)   06/12/24 5' 4.5\" (1.638 m)   05/02/24 5' 4\" (1.626 m)   12/28/23 5' 4\" (1.626 m)     HC Readings from Last 3 Encounters:   No data found for HC      Weight: Wt Readings from Last 5 Encounters:   10/01/24 69.9 kg (154 lb)   08/07/24 69.9 kg (154 lb)   06/12/24 69.5 kg (153 lb 3.2 oz)   05/02/24 72 kg (158 lb 12.8 oz)   12/28/23 69.7 kg (153 lb 10.6 oz)     There is no height or weight on file to calculate BMI.   Recent Weight Change: []Yes     [x]No  Amount: Stable last 6 months.       Energy Needs: [place]- StMaegan Schrader Equation:  1725    Allergies   Allergen Reactions    No Active Allergies        Social History     Substance and Sexual Activity   Alcohol Use No       Social History     Tobacco Use   Smoking Status Never   Smokeless Tobacco Never   Tobacco Comments    NO TOBACCO USE       Who shops? patient   Who cooks? patient   Exercise: noted   Prior Counseling? []Yes     [x]No  When:      Why:         Diet Hx:  Breakfast:  Cold cereal, [employer]ank[employer] , Apple Jacks, Cinnamon Toast crunch, Smores   Whole milk,  2 bowls ( eats a box in 4 days) OR 3 pancakes, syrup  No beverage    Lunch:  [place] with sweet bologna and american cheese on white bread, banana muffin. Water and flavored oneill.   Sometimes salad too, croutons, lettuce, shredded carrots. Italian french dressings.            Dinner:  Chicken and rice, spaghetti, water  Occasionally corn. No other vegetables.   Sometimes salad as above.            Snacks: Will have " fat free pudding occasionally.          Nutrition Diagnosis:   Food and nutrition related knowledge deficit  related to Lack of prior exposure to accurate nutrition related information as evidenced by No prior knowledge of need for food and nutrition related recommendations       Medical Nutrition Therapy Intervention:  [x]Individualized Meal Plan Rev 1600 kcal meal planning utilizing Healthy Portion book for proper portioning. Rev omitting banana muffin with sandwich and adding fruit or veg. Rev healthy snack choices.  []Understanding Lab Values   []Basic Pathophysiology of Disease []Food/Medication Interactions   []Food Diary [x]Exercise continue daily exercise as she has been achieving adequate cardio and strength training.   Sleeps 8-9 hours sleep daily. No problems sleeping.    [x]Lifestyle/Behavior Modification Techniques Kaitlynn eats fast. Rev Mindful eating, eating slowly, doing no other activity while eating.  []Medication, Mechanism of Action   [x]Label Reading Rev label reading to plan proper portions of foods she eats. Measuring cereal to reduce her current intake.  []Self Blood Glucose Monitoring   [x]Weight/BMI Goals Kaitlynn wants to maintain her current wt or lose a little more. Noted she has reduced her wt from 260 pounds 3 years ago.  []Other -    Other Notes: Does not eat after 6pm, no snacks, walks 3 hours a every other day or works 1 1/2 hrs other days , weight/strength training in gym. only drinks water, 3 meals a day. Use to weight 260 lbs, 3 years ago,  [id number] last 6 months. Wants to eat healthier.  Provided 1600 calorie meal plan, Portion book       Comprehension: []Excellent  []Very Good  [x]Good  []Fair   []Poor    Receptivity: []Excellent  []Very Good  [x]Good  []Fair   []Poor    Expected Compliance: []Excellent  []Very Good  [x]Good  []Fair   []Poor        Goals:  Aim to add vegetables to dinner every day.    2. Add a snack into the day with the reduced meal intake- rev healthy  "snack choices.     3. Add protein to breakfast, eggs , reduce cereal and pancake portions.        No follow-ups on file.  Labs:  CMP  Lab Results   Component Value Date    K 4.0 08/08/2024     08/08/2024    CO2 26 08/08/2024    BUN 16 08/08/2024    CREATININE 0.86 08/08/2024    GLUF 68 08/08/2024    CALCIUM 9.2 08/08/2024    AST 13 08/08/2024    ALT 10 08/08/2024    ALKPHOS 44 08/08/2024    EGFR 80 08/08/2024       BMP  Lab Results   Component Value Date    CALCIUM 9.2 08/08/2024    K 4.0 08/08/2024    CO2 26 08/08/2024     08/08/2024    BUN 16 08/08/2024    CREATININE 0.86 08/08/2024       Lipids  No results found for: \"CHOL\"  Lab Results   Component Value Date    HDL 51 11/29/2023    HDL 36 (L) 01/25/2019     Lab Results   Component Value Date    LDLCALC 91 11/29/2023    LDLCALC 120 (H) 01/25/2019     Lab Results   Component Value Date    TRIG 105 11/29/2023    TRIG 105 01/25/2019     No results found for: \"CHOLHDL\"    Hemoglobin A1C  Lab Results   Component Value Date    HGBA1C 5.3 10/28/2021       Fasting Glucose  Lab Results   Component Value Date    GLUF 68 08/08/2024       Insulin     Thyroid  Lab Results   Component Value Date    TSH 0.90 12/27/2022       Hepatic Function Panel  Lab Results   Component Value Date    ALT 10 08/08/2024    AST 13 08/08/2024    ALKPHOS 44 08/08/2024       Celiac Disease Antibody Panel  No results found for: \"ENDOMYSIAL IGA\", \"GLIADIN IGA\", \"GLIADIN IGG\", \"IGA\", \"TISSUE TRANSGLUT AB\", \"TTG IGA\"   Iron  No results found for: \"IRON\", \"TIBC\", \"FERRITIN\"    Vitamins  No results found for: \"VITAMIN B2\"   No results found for: \"NICOTINAMIDE\", \"NICOTINIC ACID\"   No results found for: \"VITAMINB6\"  No results found for: \"NLBNNDZY22\"  No results found for: \"VITB5\"  No results found for: \"W2QPQDRI\"  No results found for: \"THYROGLB\"  No results found for: \"VITAMIN K\"   No results found for: \"25-HYDROXY VIT D\"   No components found for: \"VITAMINE\"     Antonia Mcmahon RD  [facility] " [facility] CLINICAL NUTRITION SERVICES  [address] [place] [address] [phone]

## 2024-12-10 ENCOUNTER — APPOINTMENT (EMERGENCY)
Dept: RADIOLOGY | Facility: HOSPITAL | Age: 49
End: 2024-12-10
Payer: COMMERCIAL

## 2024-12-10 ENCOUNTER — HOSPITAL ENCOUNTER (EMERGENCY)
Facility: HOSPITAL | Age: 49
Discharge: HOME | End: 2024-12-10
Attending: EMERGENCY MEDICINE
Payer: COMMERCIAL

## 2024-12-10 VITALS
OXYGEN SATURATION: 98 % | WEIGHT: 161.16 LBS | RESPIRATION RATE: 16 BRPM | DIASTOLIC BLOOD PRESSURE: 73 MMHG | HEART RATE: 62 BPM | SYSTOLIC BLOOD PRESSURE: 129 MMHG | TEMPERATURE: 97.9 F | BODY MASS INDEX: 27.66 KG/M2

## 2024-12-10 DIAGNOSIS — R42 VERTIGO: ICD-10-CM

## 2024-12-10 DIAGNOSIS — R07.9 CHEST PAIN: Primary | ICD-10-CM

## 2024-12-10 LAB
ALBUMIN SERPL BCG-MCNC: 4.1 G/DL (ref 3.5–5)
ALP SERPL-CCNC: 43 U/L (ref 34–104)
ALT SERPL W P-5'-P-CCNC: 11 U/L (ref 7–52)
ANION GAP SERPL CALCULATED.3IONS-SCNC: 8 MMOL/L (ref 4–13)
AST SERPL W P-5'-P-CCNC: 15 U/L (ref 13–39)
ATRIAL RATE: 55 BPM
BASOPHILS # BLD AUTO: 0.07 THOUSANDS/ÂΜL (ref 0–0.1)
BASOPHILS NFR BLD AUTO: 1 % (ref 0–1)
BILIRUB SERPL-MCNC: 1.01 MG/DL (ref 0.2–1)
BUN SERPL-MCNC: 15 MG/DL (ref 5–25)
CALCIUM SERPL-MCNC: 9.1 MG/DL (ref 8.4–10.2)
CARDIAC TROPONIN I PNL SERPL HS: <2 NG/L (ref ?–50)
CHLORIDE SERPL-SCNC: 102 MMOL/L (ref 96–108)
CO2 SERPL-SCNC: 25 MMOL/L (ref 21–32)
CREAT SERPL-MCNC: 1.06 MG/DL (ref 0.6–1.3)
EOSINOPHIL # BLD AUTO: 0.04 THOUSAND/ÂΜL (ref 0–0.61)
EOSINOPHIL NFR BLD AUTO: 1 % (ref 0–6)
ERYTHROCYTE [DISTWIDTH] IN BLOOD BY AUTOMATED COUNT: 12.2 % (ref 11.6–15.1)
GFR SERPL CREATININE-BSD FRML MDRD: 62 ML/MIN/1.73SQ M
GLUCOSE SERPL-MCNC: 80 MG/DL (ref 65–140)
HCT VFR BLD AUTO: 39.4 % (ref 34.8–46.1)
HGB BLD-MCNC: 13.4 G/DL (ref 11.5–15.4)
IMM GRANULOCYTES # BLD AUTO: 0.02 THOUSAND/UL (ref 0–0.2)
IMM GRANULOCYTES NFR BLD AUTO: 0 % (ref 0–2)
LYMPHOCYTES # BLD AUTO: 1.59 THOUSANDS/ÂΜL (ref 0.6–4.47)
LYMPHOCYTES NFR BLD AUTO: 26 % (ref 14–44)
MAGNESIUM SERPL-MCNC: 2.1 MG/DL (ref 1.9–2.7)
MCH RBC QN AUTO: 28.6 PG (ref 26.8–34.3)
MCHC RBC AUTO-ENTMCNC: 34 G/DL (ref 31.4–37.4)
MCV RBC AUTO: 84 FL (ref 82–98)
MONOCYTES # BLD AUTO: 0.44 THOUSAND/ÂΜL (ref 0.17–1.22)
MONOCYTES NFR BLD AUTO: 7 % (ref 4–12)
NEUTROPHILS # BLD AUTO: 3.97 THOUSANDS/ÂΜL (ref 1.85–7.62)
NEUTS SEG NFR BLD AUTO: 65 % (ref 43–75)
NRBC BLD AUTO-RTO: 0 /100 WBCS
P AXIS: -6 DEGREES
PLATELET # BLD AUTO: 267 THOUSANDS/UL (ref 149–390)
PMV BLD AUTO: 10.3 FL (ref 8.9–12.7)
POTASSIUM SERPL-SCNC: 4 MMOL/L (ref 3.5–5.3)
PR INTERVAL: 148 MS
PROT SERPL-MCNC: 7 G/DL (ref 6.4–8.4)
QRS AXIS: 1 DEGREES
QRSD INTERVAL: 82 MS
QT INTERVAL: 440 MS
QTC INTERVAL: 421 MS
RBC # BLD AUTO: 4.68 MILLION/UL (ref 3.81–5.12)
SODIUM SERPL-SCNC: 135 MMOL/L (ref 135–147)
T WAVE AXIS: 17 DEGREES
TSH SERPL DL<=0.05 MIU/L-ACNC: 0.97 UIU/ML (ref 0.45–4.5)
VENTRICULAR RATE: 55 BPM
WBC # BLD AUTO: 6.13 THOUSAND/UL (ref 4.31–10.16)

## 2024-12-10 PROCEDURE — 93005 ELECTROCARDIOGRAM TRACING: CPT

## 2024-12-10 PROCEDURE — 99284 EMERGENCY DEPT VISIT MOD MDM: CPT | Performed by: EMERGENCY MEDICINE

## 2024-12-10 PROCEDURE — 80053 COMPREHEN METABOLIC PANEL: CPT | Performed by: EMERGENCY MEDICINE

## 2024-12-10 PROCEDURE — 85025 COMPLETE CBC W/AUTO DIFF WBC: CPT | Performed by: EMERGENCY MEDICINE

## 2024-12-10 PROCEDURE — 99285 EMERGENCY DEPT VISIT HI MDM: CPT

## 2024-12-10 PROCEDURE — 93010 ELECTROCARDIOGRAM REPORT: CPT | Performed by: INTERNAL MEDICINE

## 2024-12-10 PROCEDURE — 96360 HYDRATION IV INFUSION INIT: CPT

## 2024-12-10 PROCEDURE — 36415 COLL VENOUS BLD VENIPUNCTURE: CPT | Performed by: EMERGENCY MEDICINE

## 2024-12-10 PROCEDURE — 83735 ASSAY OF MAGNESIUM: CPT | Performed by: EMERGENCY MEDICINE

## 2024-12-10 PROCEDURE — 84484 ASSAY OF TROPONIN QUANT: CPT | Performed by: EMERGENCY MEDICINE

## 2024-12-10 PROCEDURE — 71045 X-RAY EXAM CHEST 1 VIEW: CPT

## 2024-12-10 PROCEDURE — 84443 ASSAY THYROID STIM HORMONE: CPT | Performed by: EMERGENCY MEDICINE

## 2024-12-10 RX ORDER — MECLIZINE HCL 12.5 MG 12.5 MG/1
50 TABLET ORAL ONCE
Status: COMPLETED | OUTPATIENT
Start: 2024-12-10 | End: 2024-12-10

## 2024-12-10 RX ORDER — ASPIRIN 81 MG/1
324 TABLET, CHEWABLE ORAL ONCE
Status: COMPLETED | OUTPATIENT
Start: 2024-12-10 | End: 2024-12-10

## 2024-12-10 RX ORDER — MECLIZINE HYDROCHLORIDE 25 MG/1
25 TABLET ORAL 3 TIMES DAILY PRN
Qty: 30 TABLET | Refills: 0 | Status: SHIPPED | OUTPATIENT
Start: 2024-12-10

## 2024-12-10 RX ADMIN — ASPIRIN 81 MG CHEWABLE TABLET 324 MG: 81 TABLET CHEWABLE at 10:17

## 2024-12-10 RX ADMIN — SODIUM CHLORIDE 1000 ML: 0.9 INJECTION, SOLUTION INTRAVENOUS at 10:18

## 2024-12-10 RX ADMIN — MECLIZINE 50 MG: 12.5 TABLET ORAL at 10:17

## 2024-12-12 PROBLEM — Q21.12 PFO (PATENT FORAMEN OVALE): Status: ACTIVE | Noted: 2024-12-12

## 2024-12-13 ENCOUNTER — CONSULT (OUTPATIENT)
Dept: CARDIOLOGY CLINIC | Facility: CLINIC | Age: 49
End: 2024-12-13
Payer: COMMERCIAL

## 2024-12-13 VITALS
BODY MASS INDEX: 27.91 KG/M2 | SYSTOLIC BLOOD PRESSURE: 106 MMHG | WEIGHT: 163.5 LBS | OXYGEN SATURATION: 100 % | HEART RATE: 63 BPM | DIASTOLIC BLOOD PRESSURE: 58 MMHG | HEIGHT: 64 IN

## 2024-12-13 DIAGNOSIS — Q21.12 PFO (PATENT FORAMEN OVALE): Primary | ICD-10-CM

## 2024-12-13 DIAGNOSIS — R42 DIZZINESS: ICD-10-CM

## 2024-12-13 DIAGNOSIS — R07.9 CHEST PAIN, UNSPECIFIED TYPE: ICD-10-CM

## 2024-12-13 DIAGNOSIS — Q21.12 PATENT FORAMEN OVALE: ICD-10-CM

## 2024-12-13 DIAGNOSIS — R55 PRE-SYNCOPE: ICD-10-CM

## 2024-12-13 DIAGNOSIS — R42 EPISODIC LIGHTHEADEDNESS: ICD-10-CM

## 2024-12-13 PROCEDURE — 99203 OFFICE O/P NEW LOW 30 MIN: CPT | Performed by: INTERNAL MEDICINE

## 2024-12-13 NOTE — PROGRESS NOTES
Cardiology     Clinic Visit Note  Kaitlynn Richards 49 y.o. female   MRN: [id number]    Assessment & Plan  PFO (patent foramen ovale)  10/1/2024 TTE revealed small PFO with predominant left-to-right shunt on color Doppler.  Normal LVEF 65%, without LV, RV or atrium enlargement.  Mild TR  No history of cryptogenic stroke or embolic events       Dizziness  Patient complained of episodic lightheadedness  waxing and waning. Associated symptoms include diaphoresis, vertigo and weakness.    12/2023 CT head: No intracranial hemorrhage or calvarial fracture.        Chest pain, unspecified type  Patient presented to ED on 12/10/2024 for chest pain and vertigo.  Patient describes chest pain located on left chest, started 2 days ago, constant, no radiation, 6 out of 10 in intensity.  EKG revealed sinus bradycardia with sinus arrhythmia  Troponin at ED negative  Lipid panel normal last year  Normal LVEF, no wall motion abnormality  Hx of heart attack in her dad.  He is very concerned about her chest pain.  Orders:    CTA Cardiac w FFR if needed; Future    Episodic lightheadedness    Orders:    Ambulatory Referral to Cardiology    Pre-syncope    Orders:    Ambulatory Referral to Cardiology    Patent foramen ovale    Orders:    Ambulatory Referral to Cardiology          Plan/Recommendations:    1.  Incidentally detected small PFO, no chamber enlargement.  No history of cryptogenic stroke or embolic event.  No indication for intervention.  3.  Patient has mild tenderness on chest palpitation, chest pain is likely noncardiac versus musculoskeletal.  Given that family history and patient is very concerned about chest pain.  We will perform cardiac CTA.           Schedule a follow-up appointment in as needed.     Chief Complaint:   Chief Complaint   Patient presents with    Advice Only    Chest Pain      Subjective     History of Present Illness:  Kaitlynn Richards is a 49 y.o. year old female with a past medical history of osteoarthritis,  dizziness, and PFO who presented to the office for evaluation of PFO.  Patient had recurrent dizziness.  Patient was seen by PCP for an episode of presyncope.  Echo was performed on 10/1/2024 TTE revealed small PFO with predominant left-to-right shunt on color Doppler.  Normal LVEF 65%, without LV, RV or atrium enlargement.  Mild TR. patient was also seen at the ED for chest pain and vertigo on 12/10/2024. Patient presented to ED on 12/10/2024 for chest pain and vertigo.  Patient describes chest pain located on left chest, started 2 days ago, constant, no radiation, 6 out of 10 in intensity.  EKG revealed sinus bradycardia with sinus arrhythmia    Patient denies alcohol, tobacco or recreational drug use    Previous Cardiology Workup:        ECHO:  No results found for this or any previous visit.    Results for orders placed during the hospital encounter of 10/01/24    Echo complete w/ contrast if indicated    Interpretation Summary    Left Ventricle: Left ventricular cavity size is mildly dilated. Wall thickness is normal. The left ventricular ejection fraction is 65%. Systolic function is normal. Wall motion is normal. Diastolic function is normal.    Right Ventricle: Right ventricular cavity size is normal. Systolic function is normal.    Atrial Septum: There is a small patent foramen ovale confirmed at rest with predominant left to right shunting using color Doppler.    Tricuspid Valve: There is mild regurgitation.    No results found for this or any previous visit.        ---------------------------------------------------------------------------------  Review of Systems   Constitutional:  Negative for chills and fever.   HENT:  Negative for congestion, rhinorrhea, sneezing and sore throat.    Eyes:  Negative for pain and discharge.   Respiratory:  Negative for cough, chest tightness, shortness of breath and wheezing.    Cardiovascular:  Positive for chest pain. Negative for leg swelling.   Gastrointestinal:   Negative for abdominal pain, nausea and vomiting.   Endocrine: Negative for polydipsia, polyphagia and polyuria.   Genitourinary:  Negative for flank pain, frequency and urgency.   Musculoskeletal:  Negative for arthralgias, back pain and joint swelling.   Skin:  Negative for color change and pallor.   Neurological:  Negative for dizziness, weakness, light-headedness and headaches.   Psychiatric/Behavioral:  Negative for agitation and confusion.          Current Outpatient Medications:     Aspirin-Acetaminophen-Caffeine (EXCEDRIN PO), Take by mouth if needed, Disp: , Rfl:     Aurovela FE 1/20 1-20 MG-MCG per tablet, TAKE 1 TABLET BY MOUTH DAILY TAKE 1 ACTIVE TABLET FOR 84 DAYS THEN THE INACTIVE FOR 7 DAYS, Disp: 112 tablet, Rfl: 1    fexofenadine (ALLEGRA) 60 MG tablet, Take 1 tablet (60 mg total) by mouth daily, Disp: 90 tablet, Rfl: 0    ibuprofen (MOTRIN) 600 mg tablet, Take 1 tablet (600 mg total) by mouth every 6 (six) hours as needed for mild pain, Disp: 30 tablet, Rfl: 0    levothyroxine 50 mcg tablet, Take 1 tablet (50 mcg total) by mouth daily, Disp: 90 tablet, Rfl: 3    meclizine (ANTIVERT) 12.5 MG tablet, Take 1 tablet (12.5 mg total) by mouth 3 (three) times a day as needed for dizziness, Disp: 30 tablet, Rfl: 0    omeprazole (PriLOSEC) 40 MG capsule, Take 1 capsule (40 mg total) by mouth daily, Disp: 90 capsule, Rfl: 3    SUMAtriptan (IMITREX) 50 mg tablet, Take 1 tablet (50 mg total) by mouth once as needed for migraine for up to 30 doses, Disp: 30 tablet, Rfl: 0    calcium carbonate (OS-AUGIE) 600 MG tablet, every 24 hours (Patient not taking: Reported on 6/12/2024), Disp: , Rfl:     cetirizine (ZyrTEC) 10 mg tablet, TAKE 1 TABLET BY MOUTH EVERY DAY (Patient not taking: Reported on 12/13/2024), Disp: 90 tablet, Rfl: 1    Cholecalciferol 5000 units TABS, take 1 capsule by oral route  every day start after completing weekly dose (Patient not taking: Reported on 6/12/2024), Disp: , Rfl:     meclizine  (ANTIVERT) 25 mg tablet, Take 1 tablet (25 mg total) by mouth 3 (three) times a day as needed for dizziness (Patient not taking: Reported on 2024), Disp: 30 tablet, Rfl: 0  Past Medical History:   Diagnosis Date    Allergies     Bipolar II disorder ([facility]) 2015    Class 3 severe obesity due to excess calories without serious comorbidity with body mass index (BMI) of 40.0 to 44.9 in adult ([facility]) 2020    Disease of thyroid gland     Elevated cholesterol 2022    GERD (gastroesophageal reflux disease)     Primary osteoarthritis of left knee 2020    UTI (urinary tract infection)      Past Surgical History:   Procedure Laterality Date     SECTION  2004    PREGNANCY AT TERM    NV ARTHRODESIS GREAT TOE METATARSOPHALANGEAL JOINT Left 2023    Procedure: 1ST MTPJ FUSION PSB PLANTAR PLATE REPAIR 2nd metatarsal;  Surgeon: Anne Jamil DPM;  Location: [facility];  Service: Podiatry    TOE OSTEOTOMY Left 2023    Procedure: WEIL OSTEOTOMY;  Surgeon: Anne Jamil DPM;  Location: [facility];  Service: Podiatry    TYMPANOSTOMY TUBE PLACEMENT      as child     Social History     Socioeconomic History    Marital status: Single     Spouse name: Not on file    Number of children: 1    Years of education: Not on file    Highest education level: Not on file   Occupational History    Not on file   Tobacco Use    Smoking status: Never    Smokeless tobacco: Never    Tobacco comments:     NO TOBACCO USE   Vaping Use    Vaping status: Never Used   Substance and Sexual Activity    Alcohol use: No    Drug use: No    Sexual activity: Yes     Birth control/protection: Other     Comment: Amneal   Other Topics Concern    Not on file   Social History Narrative    Not on file     Social Drivers of Health     Financial Resource Strain: Low Risk  (2022)    Overall Financial Resource Strain (CARDIA)     Difficulty of Paying Living Expenses: Not hard at all   Food Insecurity: No Food  "Insecurity (6/12/2024)    Nursing - Inadequate Food Risk Classification     Worried About Running Out of Food in the Last Year: Never true     Ran Out of Food in the Last Year: Never true     Ran Out of Food in the Last Year: Not on file   Transportation Needs: No Transportation Needs (6/12/2024)    PRAPARE - Transportation     Lack of Transportation (Medical): No     Lack of Transportation (Non-Medical): No   Physical Activity: Not on file   Stress: Not on file   Social Connections: Not on file   Intimate Partner Violence: Not on file   Housing Stability: Unknown (6/12/2024)    Housing Stability Vital Sign     Unable to Pay for Housing in the Last Year: No     Number of Times Moved in the Last Year: Not on file     Homeless in the Last Year: No     Family History   Problem Relation Age of Onset    Thyroid disease Mother         DISORDER    Lung cancer Father     Diabetes Father         MELLITUS    Hypertension Father     No Known Problems Daughter     No Known Problems Maternal Grandmother     No Known Problems Maternal Grandfather     No Known Problems Paternal Grandmother     No Known Problems Paternal Grandfather     No Known Problems Maternal Aunt     No Known Problems Maternal Aunt      Allergies   Allergen Reactions    No Active Allergies        Objective     Vitals:    12/13/24 1148   BP: 106/58   BP Location: Left arm   Patient Position: Sitting   Cuff Size: Large   Pulse: 63   SpO2: 100%   Weight: 74.2 kg (163 lb 8 oz)   Height: 5' 4\" (1.626 m)       Physical exam:     Physical Exam  HENT:      Head: Normocephalic.   Eyes:      Pupils: Pupils are equal, round, and reactive to light.   Cardiovascular:      Rate and Rhythm: Normal rate and regular rhythm.      Heart sounds: No murmur heard.  Pulmonary:      Effort: Pulmonary effort is normal. No respiratory distress.      Breath sounds: No wheezing or rales.   Chest:      Chest wall: Tenderness present.   Abdominal:      Palpations: Abdomen is soft.      " Tenderness: There is no abdominal tenderness.   Musculoskeletal:         General: No swelling. Normal range of motion.      Cervical back: Normal range of motion.      Right lower leg: No edema.      Left lower leg: No edema.   Skin:     General: Skin is warm.   Neurological:      Mental Status: She is alert and oriented to person, place, and time.   Psychiatric:         Mood and Affect: Mood normal.           ==  PLEASE NOTE:  This encounter was completed utilizing the [employer] Direct Speech Voice Recognition Software. Grammatical errors, random word insertions, pronoun errors and incomplete sentences are occasional consequences of the system due to software limitations, ambient noise and hardware issues.These may be missed by proof reading prior to affixing electronic signature. Any questions or concerns about the content, text or information contained within the body of this dictation should be directly addressed to the physician for clarification. Please do not hesitate to call me directly if you have any any questions or concerns.

## 2024-12-13 NOTE — ASSESSMENT & PLAN NOTE
10/1/2024 TTE revealed small PFO with predominant left-to-right shunt on color Doppler.  Normal LVEF 65%, without LV, RV or atrium enlargement.  Mild TR  No history of cryptogenic stroke or embolic events

## 2024-12-15 ENCOUNTER — HOSPITAL ENCOUNTER (EMERGENCY)
Facility: HOSPITAL | Age: 49
Discharge: HOME | End: 2024-12-15
Attending: EMERGENCY MEDICINE | Admitting: EMERGENCY MEDICINE
Payer: COMMERCIAL

## 2024-12-15 ENCOUNTER — APPOINTMENT (EMERGENCY)
Dept: CT IMAGING | Facility: HOSPITAL | Age: 49
End: 2024-12-15
Payer: COMMERCIAL

## 2024-12-15 VITALS
DIASTOLIC BLOOD PRESSURE: 72 MMHG | WEIGHT: 163.5 LBS | TEMPERATURE: 98.5 F | OXYGEN SATURATION: 100 % | BODY MASS INDEX: 28.06 KG/M2 | SYSTOLIC BLOOD PRESSURE: 132 MMHG | HEART RATE: 71 BPM | RESPIRATION RATE: 18 BRPM

## 2024-12-15 DIAGNOSIS — R11.0 NAUSEA: ICD-10-CM

## 2024-12-15 DIAGNOSIS — R91.8 MULTIPLE PULMONARY NODULES DETERMINED BY COMPUTED TOMOGRAPHY OF LUNG: ICD-10-CM

## 2024-12-15 DIAGNOSIS — R42 EPISODIC LIGHTHEADEDNESS: Primary | ICD-10-CM

## 2024-12-15 LAB
ALBUMIN SERPL BCG-MCNC: 3.9 G/DL (ref 3.5–5)
ALP SERPL-CCNC: 42 U/L (ref 34–104)
ALT SERPL W P-5'-P-CCNC: 14 U/L (ref 7–52)
ANION GAP SERPL CALCULATED.3IONS-SCNC: 7 MMOL/L (ref 4–13)
AST SERPL W P-5'-P-CCNC: 13 U/L (ref 13–39)
ATRIAL RATE: 73 BPM
BACTERIA UR QL AUTO: NORMAL /HPF
BASOPHILS # BLD MANUAL: 0 THOUSAND/UL (ref 0–0.1)
BASOPHILS NFR MAR MANUAL: 0 % (ref 0–1)
BILIRUB SERPL-MCNC: 0.89 MG/DL (ref 0.2–1)
BILIRUB UR QL STRIP: NEGATIVE
BUN SERPL-MCNC: 15 MG/DL (ref 5–25)
CALCIUM SERPL-MCNC: 8.6 MG/DL (ref 8.4–10.2)
CHLORIDE SERPL-SCNC: 106 MMOL/L (ref 96–108)
CLARITY UR: CLEAR
CO2 SERPL-SCNC: 25 MMOL/L (ref 21–32)
COLOR UR: ABNORMAL
CREAT SERPL-MCNC: 0.93 MG/DL (ref 0.6–1.3)
D DIMER PPP FEU-MCNC: 0.67 UG/ML FEU
EOSINOPHIL # BLD MANUAL: 0.11 THOUSAND/UL (ref 0–0.4)
EOSINOPHIL NFR BLD MANUAL: 1 % (ref 0–6)
ERYTHROCYTE [DISTWIDTH] IN BLOOD BY AUTOMATED COUNT: 12.3 % (ref 11.6–15.1)
EXT PREGNANCY TEST URINE: NEGATIVE
EXT. CONTROL: NORMAL
GFR SERPL CREATININE-BSD FRML MDRD: 72 ML/MIN/1.73SQ M
GLUCOSE SERPL-MCNC: 82 MG/DL (ref 65–140)
GLUCOSE SERPL-MCNC: 83 MG/DL (ref 65–140)
GLUCOSE UR STRIP-MCNC: NEGATIVE MG/DL
HCT VFR BLD AUTO: 45.3 % (ref 34.8–46.1)
HGB BLD-MCNC: 15 G/DL (ref 11.5–15.4)
HGB UR QL STRIP.AUTO: NEGATIVE
KETONES UR STRIP-MCNC: NEGATIVE MG/DL
LEUKOCYTE ESTERASE UR QL STRIP: NEGATIVE
LIPASE SERPL-CCNC: 17 U/L (ref 11–82)
LYMPHOCYTES # BLD AUTO: 0.66 THOUSAND/UL (ref 0.6–4.47)
LYMPHOCYTES # BLD AUTO: 6 % (ref 14–44)
MCH RBC QN AUTO: 27.9 PG (ref 26.8–34.3)
MCHC RBC AUTO-ENTMCNC: 33.1 G/DL (ref 31.4–37.4)
MCV RBC AUTO: 84 FL (ref 82–98)
MONOCYTES # BLD AUTO: 0.22 THOUSAND/UL (ref 0–1.22)
MONOCYTES NFR BLD: 2 % (ref 4–12)
NEUTROPHILS # BLD MANUAL: 9.99 THOUSAND/UL (ref 1.85–7.62)
NEUTS BAND NFR BLD MANUAL: 1 % (ref 0–8)
NEUTS SEG NFR BLD AUTO: 90 % (ref 43–75)
NITRITE UR QL STRIP: NEGATIVE
NON-SQ EPI CELLS URNS QL MICRO: NORMAL /HPF
P AXIS: -6 DEGREES
PH UR STRIP.AUTO: 5 [PH]
PLATELET # BLD AUTO: 254 THOUSANDS/UL (ref 149–390)
PLATELET BLD QL SMEAR: ADEQUATE
PMV BLD AUTO: 10.7 FL (ref 8.9–12.7)
POTASSIUM SERPL-SCNC: 4 MMOL/L (ref 3.5–5.3)
PR INTERVAL: 144 MS
PROT SERPL-MCNC: 6.7 G/DL (ref 6.4–8.4)
PROT UR STRIP-MCNC: ABNORMAL MG/DL
QRS AXIS: -4 DEGREES
QRSD INTERVAL: 80 MS
QT INTERVAL: 386 MS
QTC INTERVAL: 426 MS
RBC # BLD AUTO: 5.37 MILLION/UL (ref 3.81–5.12)
RBC #/AREA URNS AUTO: NORMAL /HPF
RBC MORPH BLD: NORMAL
SODIUM SERPL-SCNC: 138 MMOL/L (ref 135–147)
SP GR UR STRIP.AUTO: 1.02 (ref 1–1.04)
T WAVE AXIS: 23 DEGREES
TSH SERPL DL<=0.05 MIU/L-ACNC: 2.1 UIU/ML (ref 0.45–4.5)
UROBILINOGEN UA: 1 MG/DL
VENTRICULAR RATE: 73 BPM
WBC # BLD AUTO: 10.98 THOUSAND/UL (ref 4.31–10.16)
WBC #/AREA URNS AUTO: NORMAL /HPF

## 2024-12-15 PROCEDURE — 82948 REAGENT STRIP/BLOOD GLUCOSE: CPT

## 2024-12-15 PROCEDURE — 71275 CT ANGIOGRAPHY CHEST: CPT

## 2024-12-15 PROCEDURE — 93005 ELECTROCARDIOGRAM TRACING: CPT

## 2024-12-15 PROCEDURE — 36415 COLL VENOUS BLD VENIPUNCTURE: CPT | Performed by: PHYSICIAN ASSISTANT

## 2024-12-15 PROCEDURE — 93010 ELECTROCARDIOGRAM REPORT: CPT | Performed by: INTERNAL MEDICINE

## 2024-12-15 PROCEDURE — 85379 FIBRIN DEGRADATION QUANT: CPT | Performed by: PHYSICIAN ASSISTANT

## 2024-12-15 PROCEDURE — 81025 URINE PREGNANCY TEST: CPT | Performed by: PHYSICIAN ASSISTANT

## 2024-12-15 PROCEDURE — 84443 ASSAY THYROID STIM HORMONE: CPT | Performed by: PHYSICIAN ASSISTANT

## 2024-12-15 PROCEDURE — 85007 BL SMEAR W/DIFF WBC COUNT: CPT | Performed by: PHYSICIAN ASSISTANT

## 2024-12-15 PROCEDURE — 99285 EMERGENCY DEPT VISIT HI MDM: CPT

## 2024-12-15 PROCEDURE — 96361 HYDRATE IV INFUSION ADD-ON: CPT

## 2024-12-15 PROCEDURE — 96374 THER/PROPH/DIAG INJ IV PUSH: CPT

## 2024-12-15 PROCEDURE — 80053 COMPREHEN METABOLIC PANEL: CPT | Performed by: PHYSICIAN ASSISTANT

## 2024-12-15 PROCEDURE — 85027 COMPLETE CBC AUTOMATED: CPT | Performed by: PHYSICIAN ASSISTANT

## 2024-12-15 PROCEDURE — 81001 URINALYSIS AUTO W/SCOPE: CPT | Performed by: PHYSICIAN ASSISTANT

## 2024-12-15 PROCEDURE — 70450 CT HEAD/BRAIN W/O DYE: CPT

## 2024-12-15 PROCEDURE — 99285 EMERGENCY DEPT VISIT HI MDM: CPT | Performed by: PHYSICIAN ASSISTANT

## 2024-12-15 PROCEDURE — 83690 ASSAY OF LIPASE: CPT | Performed by: PHYSICIAN ASSISTANT

## 2024-12-15 RX ORDER — ONDANSETRON 2 MG/ML
4 INJECTION INTRAMUSCULAR; INTRAVENOUS ONCE
Status: COMPLETED | OUTPATIENT
Start: 2024-12-15 | End: 2024-12-15

## 2024-12-15 RX ORDER — ONDANSETRON 4 MG/1
4 TABLET, ORALLY DISINTEGRATING ORAL EVERY 8 HOURS PRN
Qty: 20 TABLET | Refills: 0 | Status: SHIPPED | OUTPATIENT
Start: 2024-12-15 | End: 2024-12-25

## 2024-12-15 RX ADMIN — ONDANSETRON 4 MG: 2 INJECTION INTRAMUSCULAR; INTRAVENOUS at 11:56

## 2024-12-15 RX ADMIN — IOHEXOL 84 ML: 350 INJECTION, SOLUTION INTRAVENOUS at 13:23

## 2024-12-15 RX ADMIN — SODIUM CHLORIDE 1000 ML: 0.9 INJECTION, SOLUTION INTRAVENOUS at 11:58

## 2024-12-15 NOTE — DISCHARGE INSTRUCTIONS
You were seen today in the emergency department for lightheadedness and nausea.  Your testing here does not reveal any life-threatening emergency.  However, we recommend you pursue further testing for your lightheadedness with a family care provider who can refer you to other specialists as needed.  Please return to the emergency department if you experience severe chest pain, numbness, loss of vision or other severe concerns.  We have prescribed you Zofran, a nausea medication he received in the ER to help with your nausea at home please use this every 8 hours as needed.  We recommend you follow-up with your family care provider as soon as you are able and I provided you a phone number for the office across the street from the emergency department you visited today.

## 2024-12-15 NOTE — ED PROVIDER NOTES
Time reflects when diagnosis was documented in both MDM as applicable and the Disposition within this note       Time User Action Codes Description Comment    12/15/2024  2:57 PM Walker, Ana Add [R42] Lightheadedness     12/15/2024  2:57 PM Walker, Ana Add [R42] Episodic lightheadedness     12/15/2024  2:57 PM Walker, Ana Modify [R42] Episodic lightheadedness     12/15/2024  2:57 PM Walker, Ana Remove [R42] Lightheadedness     12/15/2024  2:57 PM Walker, Ana Add [R11.0] Nausea     12/15/2024  2:58 PM Walker, Ana Add [R91.8] Multiple pulmonary nodules determined by computed tomography of lung     12/15/2024  2:58 PM Walker, Ana Modify [R91.8] Multiple pulmonary nodules determined by computed tomography of lung 3mm nodules - no routine follow up needed if low risk - if high risk consider chest CT at 12 months.           ED Disposition       ED Disposition   Discharge    Condition   Good    Date/Time   Sun Dec 15, 2024  2:56 PM    Comment   Kaitlynn Richards discharge to home/self care.                   Assessment & Plan       Medical Decision Making  41-year-old female presenting for evaluation of syncope/near syncope ongoing for multiple weeks patient was recently seen for chest pain and dizziness no abnormalities noted at that time and the patient reports she was not able to follow-up with a family care provider.  She denies any chest pain, exertional component to her syncopal episodes and reports she feels lightheaded and nauseous and states that this is worse with movement.  She denies any sudden onset of rotational dizziness with head movements or going from a laying to a standing position thus lowering suspicion for BPPV or vertigo.  Patient recently saw cardiology on 1 October 2024 with an echocardiogram demonstrating EF of 70% and no other significant abnormalities, lowering suspicion for cardiac cause for patient's symptoms.  Patient has no chest pain today for which  "reason troponin was not obtained.    Physical exam is reassuring without any abnormal pulmonary or cardiac findings, no nystagmus noted on exam or neurologic dysfunction.  Differential diagnosis includes that is not limited to acute anemia, dysrhythmia, metabolic disturbance, organ dysfunction, PE, intracranial abnormality such as bleed, and others.  Patient was educated that given recent normal workup it is unlikely a life-threatening condition will be found today however outpatient follow-up is critical for diagnosis of her near syncopal episodes and lightheadedness and she will likely need to follow-up with the family care provider and/or cardiology and/or ENT.     Workup is without any significant abnormalities, D-dimer was mildly elevated which reason CT PE study was obtained and noted to be negative for acute PE or other cardiothoracic abnormalities, patient did have small nodules which were discussed in detail with the patient who verbalized understanding of the need to follow-up with her primary care provider for this.    All imaging and/or lab testing discussed with patient, strict return to ED precautions discussed. Patient and/or family members verbalizes understanding and agrees with plan. Patient is stable for discharge     Portions of the record may have been created with voice recognition software. Occasional wrong word or \"sound a like\" substitutions may have occurred due to the inherent limitations of voice recognition software. Read the chart carefully and recognize, using context, where substitutions have occurred.    Amount and/or Complexity of Data Reviewed  Labs: ordered.  Radiology: ordered.    Risk  Prescription drug management.        ED Course as of 12/15/24 1504   Sun Dec 15, 2024   1403   No acute intracranial abnormality.      1455   3 mm nodules at the left and right lower lobes as described based on current Fleischner Society 2017 Guidelines on incidental pulmonary nodule, no routine " follow-up is needed if the patient is low risk. If the patient is high risk, optional follow-up   chest CT at 12 months can be considered.        1455 Patient was reexamined at this time and informed of laboratory and/or imaging results and was found to be stable for discharge.  Return to emergency department criteria was reviewed with the patient who verbalized understanding and was agreeable to discharge and the treatment plan at this time.           Medications   sodium chloride 0.9 % bolus 1,000 mL (0 mL Intravenous Stopped 12/15/24 1501)   ondansetron (ZOFRAN) injection 4 mg (4 mg Intravenous Given 12/15/24 1156)   iohexol (OMNIPAQUE) 350 MG/ML injection (MULTI-DOSE) 100 mL (84 mL Intravenous Given 12/15/24 1323)       ED Risk Strat Scores                                              History of Present Illness       Chief Complaint   Patient presents with    Dizziness     Pt states she is dizzy and nauseous with onset of symptoms last night. Denies recent sick contacts.        Past Medical History:   Diagnosis Date    Allergies     Bipolar II disorder (Formerly [facility]) 2015    Class 3 severe obesity due to excess calories without serious comorbidity with body mass index (BMI) of 40.0 to 44.9 in adult (Formerly [facility]) 2020    Disease of thyroid gland     Elevated cholesterol 2022    GERD (gastroesophageal reflux disease)     Primary osteoarthritis of left knee 2020    UTI (urinary tract infection)       Past Surgical History:   Procedure Laterality Date     SECTION  2004    PREGNANCY AT TERM    RI ARTHRODESIS GREAT TOE METATARSOPHALANGEAL JOINT Left 2023    Procedure: 1ST MTPJ FUSION PSB PLANTAR PLATE REPAIR 2nd metatarsal;  Surgeon: Anne Jamil DPM;  Location: [facility];  Service: Podiatry    TOE OSTEOTOMY Left 2023    Procedure: WEIL OSTEOTOMY;  Surgeon: Anne Jamil DPM;  Location: [facility];  Service: Podiatry    TYMPANOSTOMY TUBE PLACEMENT      as child      Family  "History   Problem Relation Age of Onset    Thyroid disease Mother         DISORDER    Lung cancer Father     Diabetes Father         MELLITUS    Hypertension Father     No Known Problems Daughter     No Known Problems Maternal Grandmother     No Known Problems Maternal Grandfather     No Known Problems Paternal Grandmother     No Known Problems Paternal Grandfather     No Known Problems Maternal Aunt     No Known Problems Maternal Aunt       Social History     Tobacco Use    Smoking status: Never    Smokeless tobacco: Never    Tobacco comments:     NO TOBACCO USE   Vaping Use    Vaping status: Never Used   Substance Use Topics    Alcohol use: No    Drug use: No      E-Cigarette/Vaping    E-Cigarette Use Never User       E-Cigarette/Vaping Substances    Nicotine No     THC No     CBD No     Flavoring No     Other No     Unknown No       I have reviewed and agree with the history as documented.     49-year-old female history of a comprehension/learning disability, bipolar 2 disorder, thyroid disease, high cholesterol, GERD presenting to the emergency department for evaluation of lightheadedness, syncopal episodes that she reports has been intermittent and ongoing for multiple weeks.  She denies any association with exertion, sudden head movements, going from a laying to standing position, chest pain, dyspnea, abdominal pain or vomiting, fevers or chills/recent illness, new medications.  She reports she will get lightheaded more with movement and reports it does resolve intermittently.  She denies any rotational dizziness and reports when she states \"dizziness\" she means a lightheaded/near syncopal sensation.  She reports at times she does have headaches but denies vision changes, numbness, tingling, weakness, paresthesias or paralysis.  She reports she was given meclizine in the emergency department at her last visit for dizziness however \"does not like how it makes me feel\" she reports for this reason she did not " fill a prescription for meclizine.       Dizziness  Associated symptoms: nausea    Associated symptoms: no chest pain, no palpitations, no shortness of breath and no vomiting        Review of Systems   Constitutional:  Negative for chills, fatigue and fever.   HENT:  Negative for congestion, ear pain, rhinorrhea and sore throat.    Eyes:  Negative for redness.   Respiratory:  Negative for chest tightness and shortness of breath.    Cardiovascular:  Negative for chest pain and palpitations.   Gastrointestinal:  Positive for nausea. Negative for abdominal pain and vomiting.   Genitourinary:  Negative for dysuria and hematuria.   Musculoskeletal: Negative.    Skin:  Negative for rash.   Neurological:  Positive for syncope and light-headedness. Negative for numbness.       Objective       ED Triage Vitals [12/15/24 1135]   Temperature Pulse Blood Pressure Respirations SpO2 Patient Position - Orthostatic VS   98.5 °F (36.9 °C) 80 124/70 18 100 % Sitting      Temp Source Heart Rate Source BP Location FiO2 (%) Pain Score    Oral Monitor Left arm -- --      Vitals      Date and Time Temp Pulse SpO2 Resp BP Pain Score FACES Pain Rating User   12/15/24 1415 -- 71 100 % 18 132/72 -- -- ES   12/15/24 1135 98.5 °F (36.9 °C) 80 100 % 18 124/70 -- -- SB            Physical Exam  Vitals and nursing note reviewed.   Constitutional:       Appearance: She is well-developed.   HENT:      Head: Normocephalic.   Eyes:      General: No scleral icterus.  Cardiovascular:      Rate and Rhythm: Normal rate and regular rhythm.   Pulmonary:      Effort: Pulmonary effort is normal.      Breath sounds: Normal breath sounds. No stridor.   Abdominal:      General: There is no distension.      Palpations: Abdomen is soft.      Tenderness: There is no abdominal tenderness.   Musculoskeletal:         General: Normal range of motion.   Skin:     General: Skin is warm and dry.      Capillary Refill: Capillary refill takes less than 2 seconds.    Neurological:      Mental Status: She is alert and oriented to person, place, and time.         Results Reviewed       Procedure Component Value Units Date/Time    Manual Differential(PHLEBS Do Not Order) [[id number]]  (Abnormal) Collected: 12/15/24 1156    Lab Status: Final result Specimen: Blood from Arm, Left Updated: 12/15/24 1311     Segmented % 90 %      Bands % 1 %      Lymphocytes % 6 %      Monocytes % 2 %      Eosinophils % 1 %      Basophils % 0 %      Absolute Neutrophils 9.99 Thousand/uL      Absolute Lymphocytes 0.66 Thousand/uL      Absolute Monocytes 0.22 Thousand/uL      Absolute Eosinophils 0.11 Thousand/uL      Absolute Basophils 0.00 Thousand/uL      Total Counted --     RBC Morphology Normal     Platelet Estimate Adequate    TSH, 3rd generation with Free T4 reflex [[id number]]  (Normal) Collected: 12/15/24 1156    Lab Status: Final result Specimen: Blood from Arm, Left Updated: 12/15/24 1258     TSH 3RD GENERATON 2.096 uIU/mL     Comprehensive metabolic panel [[id number]] Collected: 12/15/24 1156    Lab Status: Final result Specimen: Blood from Arm, Left Updated: 12/15/24 1249     Sodium 138 mmol/L      Potassium 4.0 mmol/L      Chloride 106 mmol/L      CO2 25 mmol/L      ANION GAP 7 mmol/L      BUN 15 mg/dL      Creatinine 0.93 mg/dL      Glucose 83 mg/dL      Calcium 8.6 mg/dL      AST 13 U/L      ALT 14 U/L      Alkaline Phosphatase 42 U/L      Total Protein 6.7 g/dL      Albumin 3.9 g/dL      Total Bilirubin 0.89 mg/dL      eGFR 72 ml/min/1.73sq m     Narrative:      National Kidney Disease Foundation guidelines for Chronic Kidney Disease (CKD):     Stage 1 with normal or high GFR (GFR > 90 mL/min/1.73 square meters)    Stage 2 Mild CKD (GFR = 60-89 mL/min/1.73 square meters)    Stage 3A Moderate CKD (GFR = 45-59 mL/min/1.73 square meters)    Stage 3B Moderate CKD (GFR = 30-44 mL/min/1.73 square meters)    Stage 4 Severe CKD (GFR = 15-29 mL/min/1.73 square meters)    Stage 5 End Stage CKD  (GFR <15 mL/min/1.73 square meters)  Note: GFR calculation is accurate only with a steady state creatinine    Lipase [[id number]]  (Normal) Collected: 12/15/24 1156    Lab Status: Final result Specimen: Blood from Arm, Left Updated: 12/15/24 1249     Lipase 17 u/L     D-dimer, quantitative [[id number]]  (Abnormal) Collected: 12/15/24 1156    Lab Status: Final result Specimen: Blood from Arm, Left Updated: 12/15/24 1239     D-Dimer, Quant 0.67 ug/ml FEU     Urine Microscopic [[id number]]  (Normal) Collected: 12/15/24 1214    Lab Status: Final result Specimen: Urine, Other Updated: 12/15/24 1235     RBC, UA None Seen /hpf      WBC, UA 0-1 /hpf      Epithelial Cells Occasional /hpf      Bacteria, UA Occasional /hpf     CBC and differential [[id number]]  (Abnormal) Collected: 12/15/24 1156    Lab Status: Final result Specimen: Blood from Arm, Left Updated: 12/15/24 1232     WBC 10.98 Thousand/uL      RBC 5.37 Million/uL      Hemoglobin 15.0 g/dL      Hematocrit 45.3 %      MCV 84 fL      MCH 27.9 pg      MCHC 33.1 g/dL      RDW 12.3 %      MPV 10.7 fL      Platelets 254 Thousands/uL     Narrative:      This is an appended report.  These results have been appended to a previously verified report.    UA (URINE) with reflex to Scope [[id number]]  (Abnormal) Collected: 12/15/24 1214    Lab Status: Final result Specimen: Urine, Other Updated: 12/15/24 1231     Color, UA Pema     Clarity, UA Clear     Specific Gravity, UA 1.020     pH, UA 5.0     Leukocytes, UA Negative     Nitrite, UA Negative     Protein, UA 15 (Trace) mg/dl      Glucose, UA Negative mg/dl      Ketones, UA Negative mg/dl      Bilirubin, UA Negative     Occult Blood, UA Negative     UROBILINOGEN UA 1.0 mg/dL     POCT pregnancy, urine [[id number]]  (Normal) Collected: 12/15/24 1214    Lab Status: Final result Updated: 12/15/24 1217     EXT Preg Test, Ur Negative     Control Valid    Fingerstick Glucose (POCT) [[id number]]  (Normal) Collected: 12/15/24 1142    Lab  Status: Final result Specimen: Blood Updated: 12/15/24 1143     POC Glucose 82 mg/dl             CTA chest pe study   Final Interpretation by Jerrod Strauss MD (12/15 2308)      No pulmonary embolism.      3 mm nodules at the left and right lower lobes as described based on current Fleischner Society 2017 Guidelines on incidental pulmonary nodule, no routine follow-up is needed if the patient is low risk. If the patient is high risk, optional follow-up    chest CT at 12 months can be considered.            Workstation performed: [id number]         CT head without contrast   Final Interpretation by Jerrod Strauss MD (12/15 [id number])      No acute intracranial abnormality.                  Workstation performed: [id number]             ECG 12 Lead Documentation Only    Date/Time: 12/15/2024 11:50 AM    Performed by: Ana Chatterjee PA-C  Authorized by: Ana Chatterjee PA-C    Indications / Diagnosis:  Lightheadedness  ECG reviewed by me, the ED Provider: yes    Patient location:  ED  Previous ECG:     Comparison to cardiac monitor: Yes    Interpretation:     Interpretation: normal    Rate:     ECG rate:  73    ECG rate assessment: normal    Rhythm:     Rhythm: sinus rhythm    Ectopy:     Ectopy: none    QRS:     QRS axis:  Normal    QRS intervals:  Normal  Conduction:     Conduction: normal    ST segments:     ST segments:  Normal  T waves:     T waves: normal    Comments:        QRS 82          ED Medication and Procedure Management   Prior to Admission Medications   Prescriptions Last Dose Informant Patient Reported? Taking?   Aspirin-Acetaminophen-Caffeine (EXCEDRIN PO)  Self Yes No   Sig: Take by mouth if needed   Aurovela FE 1/20 1-20 MG-MCG per tablet  Self No No   Sig: TAKE 1 TABLET BY MOUTH DAILY TAKE 1 ACTIVE TABLET FOR 84 DAYS THEN THE INACTIVE FOR 7 DAYS   Cholecalciferol 5000 units TABS  Self Yes No   Sig: take 1 capsule by oral route  every day start after completing  weekly dose   Patient not taking: Reported on 6/12/2024   SUMAtriptan (IMITREX) 50 mg tablet  Self No No   Sig: Take 1 tablet (50 mg total) by mouth once as needed for migraine for up to 30 doses   calcium carbonate (OS-AUGIE) 600 MG tablet  Self Yes No   Sig: every 24 hours   Patient not taking: Reported on 6/12/2024   cetirizine (ZyrTEC) 10 mg tablet  Self No No   Sig: TAKE 1 TABLET BY MOUTH EVERY DAY   Patient not taking: Reported on 12/13/2024   fexofenadine (ALLEGRA) 60 MG tablet  Self No No   Sig: Take 1 tablet (60 mg total) by mouth daily   ibuprofen (MOTRIN) 600 mg tablet  Self No No   Sig: Take 1 tablet (600 mg total) by mouth every 6 (six) hours as needed for mild pain   levothyroxine 50 mcg tablet  Self No No   Sig: Take 1 tablet (50 mcg total) by mouth daily   meclizine (ANTIVERT) 12.5 MG tablet  Self No No   Sig: Take 1 tablet (12.5 mg total) by mouth 3 (three) times a day as needed for dizziness   meclizine (ANTIVERT) 25 mg tablet  Self No No   Sig: Take 1 tablet (25 mg total) by mouth 3 (three) times a day as needed for dizziness   Patient not taking: Reported on 12/13/2024   omeprazole (PriLOSEC) 40 MG capsule  Self No No   Sig: Take 1 capsule (40 mg total) by mouth daily      Facility-Administered Medications: None     Patient's Medications   Discharge Prescriptions    ONDANSETRON (ZOFRAN-ODT) 4 MG DISINTEGRATING TABLET    Take 1 tablet (4 mg total) by mouth every 8 (eight) hours as needed for nausea for up to 10 days       Start Date: 12/15/2024End Date: 12/25/2024       Order Dose: 4 mg       Quantity: 20 tablet    Refills: 0     No discharge procedures on file.  ED SEPSIS DOCUMENTATION   Time reflects when diagnosis was documented in both MDM as applicable and the Disposition within this note       Time User Action Codes Description Comment    12/15/2024  2:57 PM Ana Chatterjee Add [R42] Lightheadedness     12/15/2024  2:57 PM Ana Chatterjee Add [R42] Episodic lightheadedness     12/15/2024   2:57 PM Ana Chatterjee Modify [R42] Episodic lightheadedness     12/15/2024  2:57 PM Ana Chatterjee Remove [R42] Lightheadedness     12/15/2024  2:57 PM Ana Chatterjee Add [R11.0] Nausea     12/15/2024  2:58 PM Ana Chatterjee Add [R91.8] Multiple pulmonary nodules determined by computed tomography of lung     12/15/2024  2:58 PM Ana Chatterjee Modify [R91.8] Multiple pulmonary nodules determined by computed tomography of lung 3mm nodules - no routine follow up needed if low risk - if high risk consider chest CT at 12 months.                  Ana Chatterjee PA-C  12/15/24 0690

## 2024-12-16 NOTE — ED PROVIDER NOTES
Time reflects when diagnosis was documented in both MDM as applicable and the Disposition within this note       Time User Action Codes Description Comment    12/10/2024 11:11 AM Catracho Boyd Add [R07.9] Chest pain     12/10/2024 11:11 AM Catracho Boyd Add [R42] Vertigo           ED Disposition       ED Disposition   Discharge    Condition   Stable    Date/Time   Tue Dec 10, 2024 11:11 AM    Comment   Kaitlynn Richards discharge to home/self care.                   Assessment & Plan       Medical Decision Making  49-year-old female with chest pain fatigue dizziness.  Differential diagnose includes hypothyroidism, ACS, pneumonia, pneumothorax.  Chest x-ray on my interpretation without pneumonia or pneumothorax.  Low risk for ACS by heart score.  TSH normal.  Possible vertigo as she is having nystagmus.  Improved with meclizine.  Meclizine prescribed.    Amount and/or Complexity of Data Reviewed  Labs: ordered.  Radiology: ordered.    Risk  OTC drugs.             Medications   sodium chloride 0.9 % bolus 1,000 mL (0 mL Intravenous Stopped 12/10/24 1118)   aspirin chewable tablet 324 mg (324 mg Oral Given 12/10/24 1017)   meclizine (ANTIVERT) tablet 50 mg (50 mg Oral Given 12/10/24 1017)       ED Risk Strat Scores   HEART Risk Score      Flowsheet Row Most Recent Value   Heart Score Risk Calculator    History 1 Filed at: 12/16/2024 1008   ECG 0 Filed at: 12/16/2024 1008   Age 1 Filed at: 12/16/2024 1008   Risk Factors 1 Filed at: 12/16/2024 1008   Troponin 0 Filed at: 12/16/2024 1008   HEART Score 3 Filed at: 12/16/2024 1008          HEART Risk Score      Flowsheet Row Most Recent Value   Heart Score Risk Calculator    History 1 Filed at: 12/16/2024 1008   ECG 0 Filed at: 12/16/2024 1008   Age 1 Filed at: 12/16/2024 1008   Risk Factors 1 Filed at: 12/16/2024 1008   Troponin 0 Filed at: 12/16/2024 1008   HEART Score 3 Filed at: 12/16/2024 1008                            SBIRT 20yo+      Flowsheet Row Most Recent Value    Initial Alcohol Screen: US AUDIT-C     1. How often do you have a drink containing alcohol? 0 Filed at: 12/10/2024 0944   2. How many drinks containing alcohol do you have on a typical day you are drinking?  0 Filed at: 12/10/2024 0944   3a. Male UNDER 65: How often do you have five or more drinks on one occasion? 0 Filed at: 12/10/2024 0944   3b. FEMALE Any Age, or MALE 65+: How often do you have 4 or more drinks on one occassion? 0 Filed at: 12/10/2024 0944   Audit-C Score 0 Filed at: 12/10/2024 0944   ALESSIO: How many times in the past year have you...    Used an illegal drug or used a prescription medication for non-medical reasons? Never Filed at: 12/10/2024 0944                            History of Present Illness       Chief Complaint   Patient presents with    Chest Pain     Since yesterday. Left chest, does not travel to arms. Dizziness and feels tired.    Headache       Past Medical History:   Diagnosis Date    Allergies     Bipolar II disorder ([facility]) 2015    Class 3 severe obesity due to excess calories without serious comorbidity with body mass index (BMI) of 40.0 to 44.9 in adult ([facility]) 2020    Disease of thyroid gland     Elevated cholesterol 2022    GERD (gastroesophageal reflux disease)     Primary osteoarthritis of left knee 2020    UTI (urinary tract infection)       Past Surgical History:   Procedure Laterality Date     SECTION  2004    PREGNANCY AT TERM    [place] ARTHRODESIS GREAT TOE METATARSOPHALANGEAL JOINT Left 2023    Procedure: 1ST MTPJ FUSION PSB PLANTAR PLATE REPAIR 2nd metatarsal;  Surgeon: Anne Jamil DPM;  Location: [facility];  Service: Podiatry    TOE OSTEOTOMY Left 2023    Procedure: WEIL OSTEOTOMY;  Surgeon: Anne Jamil DPM;  Location: [facility];  Service: Podiatry    TYMPANOSTOMY TUBE PLACEMENT      as child      Family History   Problem Relation Age of Onset    Thyroid disease Mother         DISORDER    Lung cancer Father      Diabetes Father         MELLITUS    Hypertension Father     No Known Problems Daughter     No Known Problems Maternal Grandmother     No Known Problems Maternal Grandfather     No Known Problems Paternal Grandmother     No Known Problems Paternal Grandfather     No Known Problems Maternal Aunt     No Known Problems Maternal Aunt       Social History     Tobacco Use    Smoking status: Never    Smokeless tobacco: Never    Tobacco comments:     NO TOBACCO USE   Vaping Use    Vaping status: Never Used   Substance Use Topics    Alcohol use: No    Drug use: No      E-Cigarette/Vaping    E-Cigarette Use Never User       E-Cigarette/Vaping Substances    Nicotine No     THC No     CBD No     Flavoring No     Other No     Unknown No       I have reviewed and agree with the history as documented.     49-year-old female presenting to the emerged department with chest pain on the left side along with dizziness and fatigue.  No radiation of pain.  Also having some headache.  No nausea vomiting diarrhea.  No shortness of breath.  No fever or chills.  No abdominal pain.        Review of Systems   Constitutional:  Positive for fatigue. Negative for chills and fever.   HENT:  Negative for ear pain and sore throat.    Eyes:  Negative for pain and visual disturbance.   Respiratory:  Negative for cough and shortness of breath.    Cardiovascular:  Positive for chest pain. Negative for palpitations.   Gastrointestinal:  Negative for abdominal pain and vomiting.   Genitourinary:  Negative for dysuria and hematuria.   Musculoskeletal:  Negative for arthralgias and back pain.   Skin:  Negative for color change and rash.   Neurological:  Positive for dizziness. Negative for seizures and syncope.   All other systems reviewed and are negative.          Objective       ED Triage Vitals   Temperature Pulse Blood Pressure Respirations SpO2 Patient Position - Orthostatic VS   12/10/24 0942 12/10/24 0942 12/10/24 0942 12/10/24 0942 12/10/24 0942  12/10/24 1116   97.9 °F (36.6 °C) 65 137/74 18 100 % Lying      Temp src Heart Rate Source BP Location FiO2 (%) Pain Score    -- 12/10/24 1116 12/10/24 1116 -- 12/10/24 0942     Monitor Left arm  7      Vitals      Date and Time Temp Pulse SpO2 Resp BP Pain Score FACES Pain Rating User   12/10/24 1120 -- -- -- -- -- 4 -- RS   12/10/24 1116 -- 62 98 % 16 129/73 4 -- RS   12/10/24 0942 97.9 °F (36.6 °C) 65 100 % 18 137/74 7 chest pain -- LB            Physical Exam  Vitals and nursing note reviewed.   Constitutional:       General: She is not in acute distress.     Appearance: She is well-developed.   HENT:      Head: Normocephalic and atraumatic.   Eyes:      Conjunctiva/sclera: Conjunctivae normal.   Cardiovascular:      Rate and Rhythm: Normal rate and regular rhythm.      Heart sounds: No murmur heard.  Pulmonary:      Effort: Pulmonary effort is normal. No respiratory distress.      Breath sounds: Normal breath sounds.   Abdominal:      Palpations: Abdomen is soft.      Tenderness: There is no abdominal tenderness.   Musculoskeletal:         General: No swelling.      Cervical back: Neck supple.   Skin:     General: Skin is warm and dry.      Capillary Refill: Capillary refill takes less than 2 seconds.   Neurological:      Mental Status: She is alert.   Psychiatric:         Mood and Affect: Mood normal.         Results Reviewed       Procedure Component Value Units Date/Time    TSH, 3rd generation with Free T4 reflex [[id number]]  (Normal) Collected: 12/10/24 1016    Lab Status: Final result Specimen: Blood from Arm, Right Updated: 12/10/24 1100     TSH 3RD GENERATON 0.971 uIU/mL     HS Troponin 0hr (reflex protocol) [[id number]]  (Normal) Collected: 12/10/24 1016    Lab Status: Final result Specimen: Blood from Arm, Right Updated: 12/10/24 1050     hs TnI 0hr <2 ng/L     Comprehensive metabolic panel [[id number]]  (Abnormal) Collected: 12/10/24 1016    Lab Status: Final result Specimen: Blood from Arm, Right  Updated: 12/10/24 1042     Sodium 135 mmol/L      Potassium 4.0 mmol/L      Chloride 102 mmol/L      CO2 25 mmol/L      ANION GAP 8 mmol/L      BUN 15 mg/dL      Creatinine 1.06 mg/dL      Glucose 80 mg/dL      Calcium 9.1 mg/dL      AST 15 U/L      ALT 11 U/L      Alkaline Phosphatase 43 U/L      Total Protein 7.0 g/dL      Albumin 4.1 g/dL      Total Bilirubin 1.01 mg/dL      eGFR 62 ml/min/1.73sq m     Narrative:      National Kidney Disease Foundation guidelines for Chronic Kidney Disease (CKD):     Stage 1 with normal or high GFR (GFR > 90 mL/min/1.73 square meters)    Stage 2 Mild CKD (GFR = 60-89 mL/min/1.73 square meters)    Stage 3A Moderate CKD (GFR = 45-59 mL/min/1.73 square meters)    Stage 3B Moderate CKD (GFR = 30-44 mL/min/1.73 square meters)    Stage 4 Severe CKD (GFR = 15-29 mL/min/1.73 square meters)    Stage 5 End Stage CKD (GFR <15 mL/min/1.73 square meters)  Note: GFR calculation is accurate only with a steady state creatinine    Magnesium [[id number]]  (Normal) Collected: 12/10/24 1016    Lab Status: Final result Specimen: Blood from Arm, Right Updated: 12/10/24 1042     Magnesium 2.1 mg/dL     CBC and differential [[id number]] Collected: 12/10/24 1016    Lab Status: Final result Specimen: Blood from Arm, Right Updated: 12/10/24 1024     WBC 6.13 Thousand/uL      RBC 4.68 Million/uL      Hemoglobin 13.4 g/dL      Hematocrit 39.4 %      MCV 84 fL      MCH 28.6 pg      MCHC 34.0 g/dL      RDW 12.2 %      MPV 10.3 fL      Platelets 267 Thousands/uL      nRBC 0 /100 WBCs      Segmented % 65 %      Immature Grans % 0 %      Lymphocytes % 26 %      Monocytes % 7 %      Eosinophils Relative 1 %      Basophils Relative 1 %      Absolute Neutrophils 3.97 Thousands/µL      Absolute Immature Grans 0.02 Thousand/uL      Absolute Lymphocytes 1.59 Thousands/µL      Absolute Monocytes 0.44 Thousand/µL      Eosinophils Absolute 0.04 Thousand/µL      Basophils Absolute 0.07 Thousands/µL             XR chest 1  view portable   Final Interpretation by Irish Padilla MD (12/10 5576)      No acute cardiopulmonary disease.            Workstation performed: [id number]             Procedures    ED Medication and Procedure Management   Prior to Admission Medications   Prescriptions Last Dose Informant Patient Reported? Taking?   Aspirin-Acetaminophen-Caffeine (EXCEDRIN PO)  Self Yes No   Sig: Take by mouth if needed   Aurovela FE 1/20 1-20 MG-MCG per tablet  Self No No   Sig: TAKE 1 TABLET BY MOUTH DAILY TAKE 1 ACTIVE TABLET FOR 84 DAYS THEN THE INACTIVE FOR 7 DAYS   Cholecalciferol 5000 units TABS  Self Yes No   Sig: take 1 capsule by oral route  every day start after completing weekly dose   Patient not taking: Reported on 6/12/2024   SUMAtriptan (IMITREX) 50 mg tablet  Self No No   Sig: Take 1 tablet (50 mg total) by mouth once as needed for migraine for up to 30 doses   calcium carbonate (OS-AUGIE) 600 MG tablet  Self Yes No   Sig: every 24 hours   Patient not taking: Reported on 6/12/2024   cetirizine (ZyrTEC) 10 mg tablet  Self No No   Sig: TAKE 1 TABLET BY MOUTH EVERY DAY   Patient not taking: Reported on 12/13/2024   fexofenadine (ALLEGRA) 60 MG tablet  Self No No   Sig: Take 1 tablet (60 mg total) by mouth daily   ibuprofen (MOTRIN) 600 mg tablet  Self No No   Sig: Take 1 tablet (600 mg total) by mouth every 6 (six) hours as needed for mild pain   levothyroxine 50 mcg tablet  Self No No   Sig: Take 1 tablet (50 mcg total) by mouth daily   meclizine (ANTIVERT) 12.5 MG tablet  Self No No   Sig: Take 1 tablet (12.5 mg total) by mouth 3 (three) times a day as needed for dizziness   omeprazole (PriLOSEC) 40 MG capsule  Self No No   Sig: Take 1 capsule (40 mg total) by mouth daily      Facility-Administered Medications: None     Discharge Medication List as of 12/10/2024 11:14 AM        START taking these medications    Details   !! meclizine (ANTIVERT) 25 mg tablet Take 1 tablet (25 mg total) by mouth 3 (three) times a day as  needed for dizziness, Starting Tue 12/10/2024, Normal       !! - Potential duplicate medications found. Please discuss with provider.        CONTINUE these medications which have NOT CHANGED    Details   Aspirin-Acetaminophen-Caffeine (EXCEDRIN PO) Take by mouth if needed, Historical Med      Aurovela FE 1/20 1-20 MG-MCG per tablet TAKE 1 TABLET BY MOUTH DAILY TAKE 1 ACTIVE TABLET FOR 84 DAYS THEN THE INACTIVE FOR 7 DAYS, Normal      calcium carbonate (OS-AUGIE) 600 MG tablet every 24 hours, Starting Wed 1/10/2018, Historical Med      cetirizine (ZyrTEC) 10 mg tablet TAKE 1 TABLET BY MOUTH EVERY DAY, Normal      Cholecalciferol 5000 units TABS take 1 capsule by oral route  every day start after completing weekly dose, Historical Med      fexofenadine (ALLEGRA) 60 MG tablet Take 1 tablet (60 mg total) by mouth daily, Starting Wed 11/29/2023, Normal      ibuprofen (MOTRIN) 600 mg tablet Take 1 tablet (600 mg total) by mouth every 6 (six) hours as needed for mild pain, Starting Tue 10/18/2022, Normal      levothyroxine 50 mcg tablet Take 1 tablet (50 mcg total) by mouth daily, Starting Fri 11/24/2023, Normal      !! meclizine (ANTIVERT) 12.5 MG tablet Take 1 tablet (12.5 mg total) by mouth 3 (three) times a day as needed for dizziness, Starting Wed 8/7/2024, Normal      omeprazole (PriLOSEC) 40 MG capsule Take 1 capsule (40 mg total) by mouth daily, Starting Fri 11/24/2023, Normal      SUMAtriptan (IMITREX) 50 mg tablet Take 1 tablet (50 mg total) by mouth once as needed for migraine for up to 30 doses, Starting Wed 11/29/2023, Normal       !! - Potential duplicate medications found. Please discuss with provider.        No discharge procedures on file.  ED SEPSIS DOCUMENTATION   Time reflects when diagnosis was documented in both MDM as applicable and the Disposition within this note       Time User Action Codes Description Comment    12/10/2024 11:11 AM Catracho Boyd Add [R07.9] Chest pain     12/10/2024 11:11 AM Oliver  Virat Add [R42] Vertigo                  Virat Neelima Boyd MD  12/16/24 1895

## 2024-12-18 ENCOUNTER — RA CDI HCC (OUTPATIENT)
Dept: OTHER | Facility: HOSPITAL | Age: 49
End: 2024-12-18

## 2024-12-23 DIAGNOSIS — R07.9 CHEST PAIN, UNSPECIFIED TYPE: Primary | ICD-10-CM

## 2024-12-23 RX ORDER — METOPROLOL TARTRATE 50 MG
50 TABLET ORAL ONCE
Qty: 1 TABLET | Refills: 0 | Status: SHIPPED | OUTPATIENT
Start: 2024-12-30 | End: 2024-12-30

## 2024-12-23 NOTE — NURSING NOTE
Cardiac CTA Questionairre      Cardiac history    Calcium Score:  None     Reason for exam: Chest pain      Have you had a CABG, angioplasty, cardiac cath, stent placement? NO     Do you have a pacemaker or defibrillator? NO     Have you ever been diagnosed with an irregular heart beat? NO     Do you have a history of having COPD or asthma?   NO                       Is your asthma controlled?    Do you have high blood pressure? NO     Do you have diabetes? NO     Did you ever smoke? NO               Do you have allergies? NKDA  Allergy to intravenous contrast or dye?  NO     Do you have kidney disease? NO   Fluid restrictions: NO                              Blood work done: 12/15/24            GFR: 72        Call placed to patient to discuss upcoming appointment at [facility] radiology department and complete consultation and Questionnaire with patient via phone. Patient is having a Cardiac CTA. Reviewed patient's allergies, also reviewed medications.  Beta blocker ordered by Cardiologist on script and requested via secure chat to send to patients pharmacy and patient aware to take 1 1/2  hour prior to scan time at 0830. Test instructions given, patient aware to hold all caffeine products for 12 hours prior (2200)to the exam this includes coffee, decaf, tea, soda and chocolate.  Also made aware to avoid solid food for 3 hours prior (0700) to exam and to increase fluids one day prior to exam unless contraindicated.  Patient was instructed that they may take all medications as usual unless otherwise directed by physician. Patient instructed to not use inhalers prior to exam, if uses may need to reschedule the study to another day. Discussed the pre procedure expectations, post procedure expectations, time entailed to perform the study and also the medications that may be used in exam (beta blocker if needed to  heart rate to under 65 beats per minute for optimal exam results and NTG given during scan for  vasodilation). Reminded patient of location and time expected for procedure, instructed to bring photo ID, insurance card and script. Recommendation for  to and from study given to patient.  Informed the patient that the study will last approximately 30-45 minutes. Pt verbalizes understanding of education and instructions. My number was also supplied to patient to call if any further questions or concerns should arise pre or post procedure. Upon asking patient to verify email address or my chart account she mentioned that she doesn't use or check either account. Patient was writing down and repeating instructions to me.

## 2024-12-27 ENCOUNTER — PROCEDURE VISIT (OUTPATIENT)
Dept: FAMILY MEDICINE CLINIC | Facility: CLINIC | Age: 49
End: 2024-12-27
Payer: COMMERCIAL

## 2024-12-27 VITALS
HEART RATE: 65 BPM | RESPIRATION RATE: 18 BRPM | OXYGEN SATURATION: 95 % | WEIGHT: 158 LBS | SYSTOLIC BLOOD PRESSURE: 110 MMHG | BODY MASS INDEX: 26.98 KG/M2 | DIASTOLIC BLOOD PRESSURE: 70 MMHG | TEMPERATURE: 98.2 F | HEIGHT: 64 IN

## 2024-12-27 DIAGNOSIS — Z01.419 WOMEN'S ANNUAL ROUTINE GYNECOLOGICAL EXAMINATION: Primary | ICD-10-CM

## 2024-12-27 DIAGNOSIS — Z30.019 ENCOUNTER FOR FEMALE BIRTH CONTROL: ICD-10-CM

## 2024-12-27 PROCEDURE — G0101 CA SCREEN;PELVIC/BREAST EXAM: HCPCS

## 2024-12-27 RX ORDER — NORETHINDRONE ACETATE AND ETHINYL ESTRADIOL 1MG-20(21)
1 KIT ORAL DAILY
Qty: 112 TABLET | Refills: 2 | Status: SHIPPED | OUTPATIENT
Start: 2024-12-27

## 2024-12-27 NOTE — PROGRESS NOTES
John Richards is a 49 y.o. female who presents for annual well woman exam. No intermenstrual bleeding, spotting, or discharge.    GYN:  No vaginal discharge, labial erythema or lesions, dyspareunia.   Menses are irregular   Contraception: OCP.   Patient is sexually active with 1 partner.   Last pap smear was in . Results were negative.   No gynecologic surgeries.    OB:   female  :  no dysuria, urinary frequency or urgency.  no hematuria, flank pain, incontinence.    Breast:   no breast mass, skin changes, dimpling, reddening, nipple retraction.   no breast discharge.   Patient does  do monthly breast exams.   Last mammogram was in . Results were negative.    Patient does no have a family history of breast, endometrial, or ovarian ca.      General:  Diet: well controlled  Exercise: well controlled   ETOH use: denies   Tobacco use: denies   Recreational drug use: denies     Screening:  Cervical cancer: last pap smear in . Results were negative.  Breast cancer: last mammogram in . Results were negative.  Colon cancer: last colonoscopy in never done.   STD screening: Declines .    Review of Systems  Review of Systems   Constitutional:  Negative for activity change, chills, fatigue and fever.   HENT:  Negative for congestion, ear pain, rhinorrhea, sore throat and trouble swallowing.    Eyes:  Negative for pain and visual disturbance.   Respiratory:  Negative for cough, chest tightness and shortness of breath.    Cardiovascular:  Negative for chest pain, palpitations and leg swelling.   Gastrointestinal:  Negative for abdominal pain, constipation, diarrhea, nausea and vomiting.   Genitourinary:  Negative for difficulty urinating, dysuria, hematuria, menstrual problem and urgency.   Musculoskeletal:  Negative for arthralgias and back pain.   Skin:  Negative for color change and rash.   Neurological:  Negative for dizziness, seizures, syncope and headaches.   Psychiatric/Behavioral:   "Negative for dysphoric mood. The patient is not nervous/anxious.    All other systems reviewed and are negative.         Objective      /70 (BP Location: Left arm, Patient Position: Sitting, Cuff Size: Standard)   Pulse 65   Temp 98.2 °F (36.8 °C) (Temporal)   Resp 18   Ht 5' 4\" (1.626 m)   Wt 71.7 kg (158 lb)   SpO2 95%   BMI 27.12 kg/m²     Physical Exam  Vitals and nursing note reviewed.   Constitutional:       General: She is not in acute distress.     Appearance: Normal appearance. She is well-developed and normal weight.   HENT:      Head: Normocephalic and atraumatic.      Right Ear: Tympanic membrane, ear canal and external ear normal. There is no impacted cerumen.      Left Ear: Tympanic membrane, ear canal and external ear normal. There is no impacted cerumen.      Nose: Nose normal.      Mouth/Throat:      Mouth: Mucous membranes are moist.      Pharynx: Oropharynx is clear.   Eyes:      Extraocular Movements: Extraocular movements intact.      Conjunctiva/sclera: Conjunctivae normal.      Pupils: Pupils are equal, round, and reactive to light.   Cardiovascular:      Rate and Rhythm: Normal rate and regular rhythm.      Pulses: Normal pulses.      Heart sounds: Normal heart sounds. No murmur heard.  Pulmonary:      Effort: Pulmonary effort is normal. No respiratory distress.      Breath sounds: Normal breath sounds.   Abdominal:      General: Bowel sounds are normal.      Palpations: Abdomen is soft.      Tenderness: There is no abdominal tenderness.   Musculoskeletal:         General: Normal range of motion.      Cervical back: Normal range of motion and neck supple.      Right lower leg: No edema.      Left lower leg: No edema.   Skin:     General: Skin is warm and dry.      Capillary Refill: Capillary refill takes less than 2 seconds.   Neurological:      General: No focal deficit present.      Mental Status: She is alert and oriented to person, place, and time. Mental status is at " baseline.   Psychiatric:         Mood and Affect: Mood normal.         Behavior: Behavior normal.         Thought Content: Thought content normal.         Judgment: Judgment normal.                 Assessment   Problem List Items Addressed This Visit    None  Visit Diagnoses         Women's annual routine gynecological examination    -  Primary      Encounter for female birth control        Relevant Medications    norethindrone-ethinyl estradiol (Aurovela FE 1/20) 1-20 MG-MCG per tablet                Plan       40+ premenopausal  1.  Routine well woman exam done today.  2.  Pap and HPV:Pap with HPV was not done today.  Current ASCCP Guidelines reviewed.  3.  Mammogram ordered. Recommend yearly mammography.   4.  The patient declined STD testing. Safe sex practices have been discussed.  5. The patient is sexually active. She agreeed to contraception and options have been discussed.    6. The following were reviewed in today's visit: breast self exam, use and side effects of OCPs, menopause, adequate intake of calcium and vitamin D, exercise, and healthy diet.  7. Patient to return to office in 12 months for GYN.           MIKI Diamond  12/29/2024  9:06 PM

## 2024-12-30 ENCOUNTER — RESULTS FOLLOW-UP (OUTPATIENT)
Dept: CARDIOLOGY CLINIC | Facility: CLINIC | Age: 49
End: 2024-12-30

## 2024-12-30 ENCOUNTER — HOSPITAL ENCOUNTER (OUTPATIENT)
Dept: RADIOLOGY | Facility: HOSPITAL | Age: 49
Discharge: HOME | End: 2024-12-30
Attending: INTERNAL MEDICINE
Payer: COMMERCIAL

## 2024-12-30 VITALS
DIASTOLIC BLOOD PRESSURE: 70 MMHG | HEART RATE: 55 BPM | SYSTOLIC BLOOD PRESSURE: 113 MMHG | RESPIRATION RATE: 15 BRPM | OXYGEN SATURATION: 100 %

## 2024-12-30 DIAGNOSIS — R07.9 CHEST PAIN, UNSPECIFIED TYPE: ICD-10-CM

## 2024-12-30 PROCEDURE — 75574 CT ANGIO HRT W/3D IMAGE: CPT

## 2024-12-30 RX ORDER — NITROGLYCERIN 0.4 MG/1
0.8 TABLET SUBLINGUAL
Status: DISCONTINUED | OUTPATIENT
Start: 2024-12-30 | End: 2024-12-31 | Stop reason: HOSPADM

## 2024-12-30 RX ADMIN — IOHEXOL 68 ML: 350 INJECTION, SOLUTION INTRAVENOUS at 10:15

## 2024-12-30 RX ADMIN — NITROGLYCERIN 0.8 MG: 0.4 TABLET SUBLINGUAL at 10:11

## 2025-01-09 ENCOUNTER — OFFICE VISIT (OUTPATIENT)
Dept: FAMILY MEDICINE CLINIC | Facility: CLINIC | Age: 50
End: 2025-01-09
Payer: COMMERCIAL

## 2025-01-09 VITALS
BODY MASS INDEX: 27.52 KG/M2 | HEART RATE: 68 BPM | OXYGEN SATURATION: 98 % | TEMPERATURE: 97.7 F | SYSTOLIC BLOOD PRESSURE: 110 MMHG | DIASTOLIC BLOOD PRESSURE: 80 MMHG | HEIGHT: 64 IN | WEIGHT: 161.2 LBS

## 2025-01-09 DIAGNOSIS — T78.40XS ALLERGY, SEQUELA: ICD-10-CM

## 2025-01-09 DIAGNOSIS — G43.809 OTHER MIGRAINE WITHOUT STATUS MIGRAINOSUS, NOT INTRACTABLE: Primary | ICD-10-CM

## 2025-01-09 PROCEDURE — 99213 OFFICE O/P EST LOW 20 MIN: CPT

## 2025-01-09 RX ORDER — SUMATRIPTAN 50 MG/1
50 TABLET, FILM COATED ORAL ONCE AS NEEDED
Qty: 30 TABLET | Refills: 0 | Status: SHIPPED | OUTPATIENT
Start: 2025-01-09

## 2025-01-09 RX ORDER — FEXOFENADINE HCL 60 MG/1
60 TABLET, FILM COATED ORAL DAILY PRN
Start: 2025-01-09

## 2025-01-09 NOTE — PROGRESS NOTES
Name: Kaitlynn Richards      : 1975      MRN: [id number]  Encounter Provider: MIKI Diamond  Encounter Date: 2025   Encounter department: [facility]  :  Assessment & Plan  Other migraine without status migrainosus, not intractable  Headache/migraine treatment:   Abortive medications (for immediate treatment of a headache): Ok to take ibuprofen or acetaminophen for headaches, but try to limit the amount and frequency that you are taking to avoid medication overuse/rebound headache. Ideally no more than 2-3 days per week.    Lifestyle Recommendations:  - Maintain good sleep hygiene.  Going to bed and waking up at consistent times, avoiding excessive daytime naps, avoiding caffeinated beverages in the evening, avoid excessive stimulation in the evening and generally using bed primarily for sleeping.  One hour before bedtime would recommend turning lights down lower, decreasing your activity (may read quietly, listen to music at a low volume). When you get into bed, should eliminate all technology (no texting, emailing, playing with your phone, iPad or tablet in bed).  - Maintain good hydration. Drink  2L of fluid a day (4 typical small water bottles)  - Maintain good nutrition. In particular don't skip meals and eat balanced meals regularly.  -Maintain proper exercise 150 minutes/week    Orders:    SUMAtriptan (IMITREX) 50 mg tablet; Take 1 tablet (50 mg total) by mouth once as needed for migraine for up to 30 doses    Allergy, sequela    Orders:    fexofenadine (ALLEGRA) 60 MG tablet; Take 1 tablet (60 mg total) by mouth daily as needed (allergies)           History of Present Illness     Migraine  This is a new problem. The current episode started in the past 7 days. The problem occurs daily. The problem is unchanged. The pain is present in the frontal. The pain does not radiate. The pain quality is similar to prior headaches. The quality of the pain is described as  band-like (pressure). The pain is at a severity of 8/10. The pain is severe. Associated symptoms include blurred vision, dizziness, a loss of balance, nausea, a visual change and weakness. Pertinent negatives include no abdominal pain, abnormal behavior, anorexia, back pain, coughing, diarrhea, ear pain, eye pain, eye watering, facial sweating, fever, hearing loss, insomnia, numbness, phonophobia, photophobia, rhinorrhea, seizures, sinus pressure, sore throat, vomiting or weight loss. The symptoms are aggravated by exposure to cold air. Past treatments include Excedrin and acetaminophen. The treatment provided no relief. There is no history of intracranial lesions, migraine headaches, migraines in the family, obesity, recent head traumas, a seizure disorder, sinus disease or a ventriculoperitoneal shunt.     Review of Systems   Constitutional:  Negative for activity change, chills, fatigue, fever and weight loss.   HENT:  Negative for congestion, ear pain, hearing loss, rhinorrhea, sinus pressure, sore throat and trouble swallowing.    Eyes:  Positive for blurred vision. Negative for photophobia, pain and visual disturbance.   Respiratory:  Negative for cough, chest tightness and shortness of breath.    Cardiovascular:  Negative for chest pain, palpitations and leg swelling.   Gastrointestinal:  Positive for nausea. Negative for abdominal pain, anorexia, constipation, diarrhea and vomiting.   Genitourinary:  Negative for difficulty urinating, dysuria, hematuria and urgency.   Musculoskeletal:  Negative for arthralgias and back pain.   Skin:  Negative for color change and rash.   Neurological:  Positive for dizziness, weakness, headaches and loss of balance. Negative for seizures, syncope and numbness.   Psychiatric/Behavioral:  Negative for dysphoric mood. The patient is not nervous/anxious and does not have insomnia.    All other systems reviewed and are negative.      Objective   /80 (BP Location: Left arm,  "Patient Position: Sitting, Cuff Size: Standard)   Pulse 68   Temp 97.7 °F (36.5 °C) (Temporal)   Ht 5' 4\" (1.626 m)   Wt 73.1 kg (161 lb 3.2 oz)   SpO2 98%   BMI 27.67 kg/m²      Physical Exam  Vitals and nursing note reviewed.   Constitutional:       General: She is not in acute distress.     Appearance: Normal appearance. She is well-developed and normal weight.   HENT:      Head: Normocephalic and atraumatic.      Right Ear: Tympanic membrane, ear canal and external ear normal. There is no impacted cerumen.      Left Ear: Tympanic membrane, ear canal and external ear normal. There is no impacted cerumen.      Nose: Nose normal.      Mouth/Throat:      Mouth: Mucous membranes are moist.      Pharynx: Oropharynx is clear.   Eyes:      Extraocular Movements: Extraocular movements intact.      Conjunctiva/sclera: Conjunctivae normal.      Pupils: Pupils are equal, round, and reactive to light.   Cardiovascular:      Rate and Rhythm: Regular rhythm.      Pulses: Normal pulses.      Heart sounds: Normal heart sounds. No murmur heard.  Pulmonary:      Effort: Pulmonary effort is normal. No respiratory distress.      Breath sounds: Normal breath sounds.   Abdominal:      General: Bowel sounds are normal.      Palpations: Abdomen is soft.      Tenderness: There is no abdominal tenderness.   Musculoskeletal:         General: Normal range of motion.      Cervical back: Normal range of motion and neck supple.      Right lower leg: No edema.      Left lower leg: No edema.   Skin:     General: Skin is warm and dry.      Capillary Refill: Capillary refill takes less than 2 seconds.   Neurological:      General: No focal deficit present.      Mental Status: She is alert and oriented to person, place, and time. Mental status is at baseline.      Sensory: Sensation is intact.      Motor: Motor function is intact.      Coordination: Coordination is intact.   Psychiatric:         Mood and Affect: Mood normal.         Behavior: " Behavior normal.         Thought Content: Thought content normal.         Judgment: Judgment normal.       Administrative Statements   I have spent a total time of 20 minutes in caring for this patient on the day of the visit/encounter including Diagnostic results, Prognosis, Risks and benefits of tx options, Instructions for management, Patient and family education, Importance of tx compliance, Risk factor reductions, Impressions, Counseling / Coordination of care, Documenting in the medical record, Reviewing / ordering tests, medicine, procedures  , and Obtaining or reviewing history  .

## 2025-01-09 NOTE — ASSESSMENT & PLAN NOTE
Headache/migraine treatment:   Abortive medications (for immediate treatment of a headache): Ok to take ibuprofen or acetaminophen for headaches, but try to limit the amount and frequency that you are taking to avoid medication overuse/rebound headache. Ideally no more than 2-3 days per week.    Lifestyle Recommendations:  - Maintain good sleep hygiene.  Going to bed and waking up at consistent times, avoiding excessive daytime naps, avoiding caffeinated beverages in the evening, avoid excessive stimulation in the evening and generally using bed primarily for sleeping.  One hour before bedtime would recommend turning lights down lower, decreasing your activity (may read quietly, listen to music at a low volume). When you get into bed, should eliminate all technology (no texting, emailing, playing with your phone, iPad or tablet in bed).  - Maintain good hydration. Drink  2L of fluid a day (4 typical small water bottles)  - Maintain good nutrition. In particular don't skip meals and eat balanced meals regularly.  -Maintain proper exercise 150 minutes/week    Orders:    SUMAtriptan (IMITREX) 50 mg tablet; Take 1 tablet (50 mg total) by mouth once as needed for migraine for up to 30 doses

## 2025-01-10 DIAGNOSIS — K21.9 GASTROESOPHAGEAL REFLUX DISEASE WITHOUT ESOPHAGITIS: ICD-10-CM

## 2025-01-10 DIAGNOSIS — E03.9 HYPOTHYROIDISM, UNSPECIFIED TYPE: ICD-10-CM

## 2025-01-11 RX ORDER — LEVOTHYROXINE SODIUM 50 UG/1
50 TABLET ORAL DAILY
Qty: 90 TABLET | Refills: 3 | Status: SHIPPED | OUTPATIENT
Start: 2025-01-11

## 2025-01-11 RX ORDER — OMEPRAZOLE 40 MG/1
40 CAPSULE, DELAYED RELEASE ORAL DAILY
Qty: 90 CAPSULE | Refills: 3 | Status: SHIPPED | OUTPATIENT
Start: 2025-01-11

## 2025-03-12 ENCOUNTER — TELEPHONE (OUTPATIENT)
Dept: OBGYN CLINIC | Facility: MEDICAL CENTER | Age: 50
End: 2025-03-12

## 2025-03-20 ENCOUNTER — OFFICE VISIT (OUTPATIENT)
Dept: FAMILY MEDICINE CLINIC | Facility: CLINIC | Age: 50
End: 2025-03-20
Payer: COMMERCIAL

## 2025-03-20 VITALS
HEIGHT: 64 IN | WEIGHT: 165 LBS | OXYGEN SATURATION: 98 % | SYSTOLIC BLOOD PRESSURE: 130 MMHG | HEART RATE: 51 BPM | TEMPERATURE: 98 F | DIASTOLIC BLOOD PRESSURE: 76 MMHG | BODY MASS INDEX: 28.17 KG/M2

## 2025-03-20 DIAGNOSIS — R21 RASH: Primary | ICD-10-CM

## 2025-03-20 PROCEDURE — 99213 OFFICE O/P EST LOW 20 MIN: CPT

## 2025-03-20 NOTE — PROGRESS NOTES
Name: Kaitlynn Richards      : 1975      MRN: [id number]  Encounter Provider: MIKI Diamond  Encounter Date: 3/20/2025   Encounter department: [facility]  :  Assessment & Plan  Rash  Kaitlynn Richards is a 49 y.o. female who presents for evaluation of a rash involving the  gluteal area . Rash started a few days ago. Lesions are pink, and flat in texture. Rash has not changed over time. Rash is pruritic. Associated symptoms: none. Patient denies: abdominal pain, arthralgia, congestion, cough, crankiness, decrease in appetite, decrease in energy level, fever, headache, irritability, myalgia, nausea, sore throat, and vomiting. Patient has not had contacts with similar rash. Patient has not had new exposures (soaps, lotions, laundry detergents, foods, medications, plants, insects or animals).      dermatitis.    Verbal patient instruction given..               History of Present Illness   Rash  This is a new problem. The current episode started yesterday. The problem has been gradually worsening since onset. Location: gluteal area. The problem is mild. The rash is characterized by redness, dryness and itchiness. It is unknown if there was an exposure to a precipitant. Pertinent negatives include no congestion, cough, decreased physical activity, decreased responsiveness, decreased sleep, drinking less, diarrhea, facial edema, fatigue, fever, itching, joint pain, rhinorrhea, shortness of breath, sore throat or vomiting. The treatment provided no relief. There is no history of allergies, asthma, eczema or varicella. There were no sick contacts.     Review of Systems   Constitutional:  Negative for activity change, chills, decreased responsiveness, fatigue and fever.   HENT:  Negative for congestion, ear pain, rhinorrhea, sore throat and trouble swallowing.    Eyes:  Negative for pain and visual disturbance.   Respiratory:  Negative for cough, chest tightness and shortness of breath.  "   Cardiovascular:  Negative for chest pain, palpitations and leg swelling.   Gastrointestinal:  Negative for abdominal pain, constipation, diarrhea, nausea and vomiting.   Genitourinary:  Negative for difficulty urinating, dysuria, hematuria and urgency.   Musculoskeletal:  Negative for arthralgias, back pain and joint pain.   Skin:  Positive for rash. Negative for color change and itching.   Neurological:  Negative for dizziness, seizures, syncope and headaches.   Psychiatric/Behavioral:  Negative for dysphoric mood. The patient is not nervous/anxious.    All other systems reviewed and are negative.      Objective   /76 (BP Location: Left arm, Patient Position: Sitting, Cuff Size: Large)   Pulse (!) 51   Temp 98 °F (36.7 °C)   Ht 5' 4\" (1.626 m)   Wt 74.8 kg (165 lb)   SpO2 98%   BMI 28.32 kg/m²      Physical Exam  Vitals and nursing note reviewed.   Constitutional:       General: She is not in acute distress.     Appearance: Normal appearance. She is well-developed and normal weight.   HENT:      Head: Normocephalic and atraumatic.      Right Ear: Tympanic membrane, ear canal and external ear normal. There is no impacted cerumen.      Left Ear: Tympanic membrane, ear canal and external ear normal. There is no impacted cerumen.      Nose: Nose normal.      Mouth/Throat:      Mouth: Mucous membranes are moist.      Pharynx: Oropharynx is clear.   Eyes:      Extraocular Movements: Extraocular movements intact.      Conjunctiva/sclera: Conjunctivae normal.      Pupils: Pupils are equal, round, and reactive to light.   Cardiovascular:      Rate and Rhythm: Normal rate and regular rhythm.      Pulses: Normal pulses.      Heart sounds: Normal heart sounds. No murmur heard.  Pulmonary:      Effort: Pulmonary effort is normal. No respiratory distress.      Breath sounds: Normal breath sounds.   Abdominal:      General: Bowel sounds are normal.      Palpations: Abdomen is soft.      Tenderness: There is no " abdominal tenderness.   Musculoskeletal:         General: Normal range of motion.      Cervical back: Normal range of motion and neck supple.      Right lower leg: No edema.      Left lower leg: No edema.   Skin:     General: Skin is warm and dry.      Capillary Refill: Capillary refill takes less than 2 seconds.      Findings: Rash present.   Neurological:      General: No focal deficit present.      Mental Status: She is alert and oriented to person, place, and time. Mental status is at baseline.   Psychiatric:         Mood and Affect: Mood normal.         Behavior: Behavior normal.         Thought Content: Thought content normal.         Judgment: Judgment normal.     Administrative Statements   I have spent a total time of 20 minutes in caring for this patient on the day of the visit/encounter including Diagnostic results, Prognosis, Risks and benefits of tx options, Instructions for management, Patient and family education, Importance of tx compliance, Risk factor reductions, Impressions, Counseling / Coordination of care, Documenting in the medical record, Reviewing/placing orders in the medical record (including tests, medications, and/or procedures), Obtaining or reviewing history  , and Communicating with other healthcare professionals .

## 2025-03-29 ENCOUNTER — HOSPITAL ENCOUNTER (EMERGENCY)
Facility: HOSPITAL | Age: 50
Discharge: HOME | End: 2025-03-29
Attending: EMERGENCY MEDICINE
Payer: COMMERCIAL

## 2025-03-29 ENCOUNTER — APPOINTMENT (EMERGENCY)
Dept: CT IMAGING | Facility: HOSPITAL | Age: 50
End: 2025-03-29
Payer: COMMERCIAL

## 2025-03-29 VITALS
TEMPERATURE: 98.1 F | BODY MASS INDEX: 28.46 KG/M2 | WEIGHT: 165.79 LBS | SYSTOLIC BLOOD PRESSURE: 129 MMHG | DIASTOLIC BLOOD PRESSURE: 75 MMHG | RESPIRATION RATE: 18 BRPM | OXYGEN SATURATION: 99 % | HEART RATE: 65 BPM

## 2025-03-29 DIAGNOSIS — N89.8 VAGINAL DISCHARGE: ICD-10-CM

## 2025-03-29 DIAGNOSIS — K59.00 CONSTIPATION: ICD-10-CM

## 2025-03-29 DIAGNOSIS — R10.30 LOWER ABDOMINAL PAIN: Primary | ICD-10-CM

## 2025-03-29 DIAGNOSIS — B37.31 CANDIDAL VAGINITIS: ICD-10-CM

## 2025-03-29 LAB
ALBUMIN SERPL BCG-MCNC: 3.9 G/DL (ref 3.5–5)
ALP SERPL-CCNC: 41 U/L (ref 34–104)
ALT SERPL W P-5'-P-CCNC: 13 U/L (ref 7–52)
ANION GAP SERPL CALCULATED.3IONS-SCNC: 7 MMOL/L (ref 4–13)
AST SERPL W P-5'-P-CCNC: 22 U/L (ref 13–39)
BASOPHILS # BLD AUTO: 0.07 THOUSANDS/ÂΜL (ref 0–0.1)
BASOPHILS NFR BLD AUTO: 1 % (ref 0–1)
BILIRUB SERPL-MCNC: 0.36 MG/DL (ref 0.2–1)
BILIRUB UR QL STRIP: NEGATIVE
BUN SERPL-MCNC: 17 MG/DL (ref 5–25)
CALCIUM SERPL-MCNC: 9 MG/DL (ref 8.4–10.2)
CHLORIDE SERPL-SCNC: 105 MMOL/L (ref 96–108)
CLARITY UR: CLEAR
CO2 SERPL-SCNC: 24 MMOL/L (ref 21–32)
COLOR UR: ABNORMAL
CREAT SERPL-MCNC: 0.91 MG/DL (ref 0.6–1.3)
EOSINOPHIL # BLD AUTO: 0.15 THOUSAND/ÂΜL (ref 0–0.61)
EOSINOPHIL NFR BLD AUTO: 2 % (ref 0–6)
ERYTHROCYTE [DISTWIDTH] IN BLOOD BY AUTOMATED COUNT: 12.4 % (ref 11.6–15.1)
EXT PREGNANCY TEST URINE: NEGATIVE
EXT. CONTROL: NORMAL
GFR SERPL CREATININE-BSD FRML MDRD: 74 ML/MIN/1.73SQ M
GLUCOSE SERPL-MCNC: 79 MG/DL (ref 65–140)
GLUCOSE UR STRIP-MCNC: NEGATIVE MG/DL
HCT VFR BLD AUTO: 38.3 % (ref 34.8–46.1)
HGB BLD-MCNC: 13 G/DL (ref 11.5–15.4)
HGB UR QL STRIP.AUTO: NEGATIVE
IMM GRANULOCYTES # BLD AUTO: 0.03 THOUSAND/UL (ref 0–0.2)
IMM GRANULOCYTES NFR BLD AUTO: 1 % (ref 0–2)
KETONES UR STRIP-MCNC: NEGATIVE MG/DL
LEUKOCYTE ESTERASE UR QL STRIP: NEGATIVE
LIPASE SERPL-CCNC: 32 U/L (ref 11–82)
LYMPHOCYTES # BLD AUTO: 2.37 THOUSANDS/ÂΜL (ref 0.6–4.47)
LYMPHOCYTES NFR BLD AUTO: 38 % (ref 14–44)
MCH RBC QN AUTO: 28.1 PG (ref 26.8–34.3)
MCHC RBC AUTO-ENTMCNC: 33.9 G/DL (ref 31.4–37.4)
MCV RBC AUTO: 83 FL (ref 82–98)
MONOCYTES # BLD AUTO: 0.54 THOUSAND/ÂΜL (ref 0.17–1.22)
MONOCYTES NFR BLD AUTO: 9 % (ref 4–12)
NEUTROPHILS # BLD AUTO: 3.1 THOUSANDS/ÂΜL (ref 1.85–7.62)
NEUTS SEG NFR BLD AUTO: 49 % (ref 43–75)
NITRITE UR QL STRIP: NEGATIVE
NRBC BLD AUTO-RTO: 0 /100 WBCS
PH UR STRIP.AUTO: 6 [PH]
PLATELET # BLD AUTO: 263 THOUSANDS/UL (ref 149–390)
PMV BLD AUTO: 10.3 FL (ref 8.9–12.7)
POTASSIUM SERPL-SCNC: 3.9 MMOL/L (ref 3.5–5.3)
PROT SERPL-MCNC: 6.6 G/DL (ref 6.4–8.4)
PROT UR STRIP-MCNC: NEGATIVE MG/DL
RBC # BLD AUTO: 4.62 MILLION/UL (ref 3.81–5.12)
SODIUM SERPL-SCNC: 136 MMOL/L (ref 135–147)
SP GR UR STRIP.AUTO: 1.02 (ref 1–1.04)
UROBILINOGEN UA: NEGATIVE MG/DL
WBC # BLD AUTO: 6.26 THOUSAND/UL (ref 4.31–10.16)

## 2025-03-29 PROCEDURE — 99285 EMERGENCY DEPT VISIT HI MDM: CPT

## 2025-03-29 PROCEDURE — 83690 ASSAY OF LIPASE: CPT

## 2025-03-29 PROCEDURE — 81514 NFCT DS BV&VAGINITIS DNA ALG: CPT

## 2025-03-29 PROCEDURE — 74177 CT ABD & PELVIS W/CONTRAST: CPT

## 2025-03-29 PROCEDURE — 99284 EMERGENCY DEPT VISIT MOD MDM: CPT

## 2025-03-29 PROCEDURE — 80053 COMPREHEN METABOLIC PANEL: CPT

## 2025-03-29 PROCEDURE — 85025 COMPLETE CBC W/AUTO DIFF WBC: CPT

## 2025-03-29 PROCEDURE — 96374 THER/PROPH/DIAG INJ IV PUSH: CPT

## 2025-03-29 PROCEDURE — 36415 COLL VENOUS BLD VENIPUNCTURE: CPT

## 2025-03-29 PROCEDURE — 87491 CHLMYD TRACH DNA AMP PROBE: CPT

## 2025-03-29 PROCEDURE — 87591 N.GONORRHOEAE DNA AMP PROB: CPT

## 2025-03-29 PROCEDURE — 81003 URINALYSIS AUTO W/O SCOPE: CPT

## 2025-03-29 PROCEDURE — 81025 URINE PREGNANCY TEST: CPT

## 2025-03-29 RX ORDER — POLYETHYLENE GLYCOL 3350 17 G/17G
17 POWDER, FOR SOLUTION ORAL DAILY
Qty: 51 G | Refills: 0 | Status: SHIPPED | OUTPATIENT
Start: 2025-03-29 | End: 2025-04-01

## 2025-03-29 RX ORDER — KETOROLAC TROMETHAMINE 30 MG/ML
15 INJECTION, SOLUTION INTRAMUSCULAR; INTRAVENOUS ONCE
Status: COMPLETED | OUTPATIENT
Start: 2025-03-29 | End: 2025-03-29

## 2025-03-29 RX ADMIN — IOHEXOL 100 ML: 350 INJECTION, SOLUTION INTRAVENOUS at 15:59

## 2025-03-29 RX ADMIN — KETOROLAC TROMETHAMINE 15 MG: 30 INJECTION, SOLUTION INTRAMUSCULAR; INTRAVENOUS at 15:39

## 2025-03-29 NOTE — DISCHARGE INSTRUCTIONS
Stay hydrated.  Increase fiber in diet.  Follow-up with PCP.  Return to ED new or worsening symptoms as discussed.    We will call with any positive outstanding test results and make any necessary changes treatment plan at that time.  All results will show up on your MyChart.

## 2025-03-29 NOTE — ED PROVIDER NOTES
"Time reflects when diagnosis was documented in both MDM as applicable and the Disposition within this note       Time User Action Codes Description Comment    3/29/2025  4:46 PM MasseyJocelin ramirez Add [R10.30] Lower abdominal pain     3/29/2025  4:47 PM Massey, Jocelin Add [R19.5] Increased stool volume     3/29/2025  4:47 PM MasseyOctavio ramirezia Remove [R19.5] Increased stool volume     3/29/2025  4:47 PM MasseyOctavio ramirezia Add [K59.00] Constipation     3/29/2025  4:58 PM MasseyOctavio ramirezia Add [N89.8] Vaginal discharge           ED Disposition       ED Disposition   Discharge    Condition   Stable    Date/Time   Sat Mar 29, 2025  5:01 PM    Comment   Kaitlynn Richards discharge to home/self care.                   Assessment & Plan       Medical Decision Making  Ddx includes but is not limited to UTI, cervicitis, nephrolithiasis, diverticulitis, appendicitis colitis constipation.     Work-up to include blood work, CBC as marker of infectivity, hemoconcentration for hydration status, CMP to check for electrolytes, renal function, liver function, Lipase to check for pancreatitis, CT scan to evaluate for potential intra-abdominal surgical pathology.  UA to assess for infection.  Low clinical suspicion PID given pelvic exam.  UA without infection.  CT with moderate stool burden otherwise no acute findings.  Remainder of labs without acute findings.    All imaging and/or lab testing discussed with patient, strict return to ED precautions discussed. Patient recommended to follow up promptly with appropriate outpatient provider and risk of morbidity/mortality if patient does not follow up as recommended was discussed. Patient and/or family members verbalizes understanding and agrees with plan. Patient and/or family members were given opportunity to ask questions, all questions were answered at this time. Patient is stable for discharge.     Portions of the record may have been created with voice recognition software. Occasional wrong word or \"sound a " "like\" substitutions may have occurred due to the inherent limitations of voice recognition software. Read the chart carefully and recognize, using context, where substitutions have occurred.         Amount and/or Complexity of Data Reviewed  Labs: ordered.  Radiology: ordered. Decision-making details documented in ED Course.    Risk  OTC drugs.  Prescription drug management.        ED Course as of 03/30/25 1825   Sat Mar 29, 2025   1516 Lower abdominal pain for 5 days.  No fevers chills nausea vomiting diarrhea or constipation.  Last bowel movement yesterday.  On birth control does not regulate her period.  G3, P3.  History of C-sections otherwise no abdominal or pelvic surgical history.  Denies vaginal discharge.  Denies dysuria, hematuria, urinary urgency, urinary frequency, flank pain.   1640 CT abdomen pelvis with contrast  Discussed with Radiologist Dr. Ger Adhikari, who clarified that CT was negative and is writing an addendum    0763 Pelvic exam chaperoned by ED trenton Barrera. Thick white Vaginal discharge noted.  Cervix normal in appearance.  Nontender.  No cervical motion tenderness.  No adnexal tenderness.  No chandelier sign.  No vaginal bleeding. Reports sexually active with 1 long term partner. Low clinical suspicion PID at this time. Await g/c, mvp.        Medications   ketorolac (TORADOL) injection 15 mg (15 mg Intravenous Given 3/29/25 1539)   iohexol (OMNIPAQUE) 350 MG/ML injection (MULTI-DOSE) 100 mL (100 mL Intravenous Given 3/29/25 1559)       ED Risk Strat Scores                            SBIRT 22yo+      Flowsheet Row Most Recent Value   Initial Alcohol Screen: US AUDIT-C     1. How often do you have a drink containing alcohol? 0 Filed at: 03/29/2025 1437   2. How many drinks containing alcohol do you have on a typical day you are drinking?  0 Filed at: 03/29/2025 1435   3b. FEMALE Any Age, or MALE 65+: How often do you have 4 or more drinks on one occassion? 0 Filed at: 03/29/2025 1435 "   Audit-C Score 0 Filed at: 2025 [id number]   ALESSIO: How many times in the past year have you...    Used an illegal drug or used a prescription medication for non-medical reasons? Never Filed at: 2025 8705                            History of Present Illness       Chief Complaint   Patient presents with    Abdominal Pain     Pt c/o abd pain for the last several days. Denies any other symptoms. Denies taking anything OTC       Past Medical History:   Diagnosis Date    Allergies     Bipolar II disorder ([facility]) 2015    Class 3 severe obesity due to excess calories without serious comorbidity with body mass index (BMI) of 40.0 to 44.9 in adult ([facility]) 2020    Disease of thyroid gland     Elevated cholesterol 2022    GERD (gastroesophageal reflux disease)     Primary osteoarthritis of left knee 2020    UTI (urinary tract infection)       Past Surgical History:   Procedure Laterality Date     SECTION  2004    PREGNANCY AT TERM    NY ARTHRODESIS GREAT TOE METATARSOPHALANGEAL JOINT Left 2023    Procedure: 1ST MTPJ FUSION PSB PLANTAR PLATE REPAIR 2nd metatarsal;  Surgeon: Anne Jamil DPM;  Location: [facility];  Service: Podiatry    TOE OSTEOTOMY Left 2023    Procedure: WEIL OSTEOTOMY;  Surgeon: Anne Jamil DPM;  Location: [facility];  Service: Podiatry    TYMPANOSTOMY TUBE PLACEMENT      as child      Family History   Problem Relation Age of Onset    Thyroid disease Mother         DISORDER    Lung cancer Father     Diabetes Father         MELLITUS    Hypertension Father     No Known Problems Daughter     No Known Problems Maternal Grandmother     No Known Problems Maternal Grandfather     No Known Problems Paternal Grandmother     No Known Problems Paternal Grandfather     No Known Problems Maternal Aunt     No Known Problems Maternal Aunt       Social History     Tobacco Use    Smoking status: Never     Passive exposure: Never    Smokeless tobacco: Never     Tobacco comments:     NO TOBACCO USE   Vaping Use    Vaping status: Never Used   Substance Use Topics    Alcohol use: No    Drug use: No      E-Cigarette/Vaping    E-Cigarette Use Never User       E-Cigarette/Vaping Substances    Nicotine No     THC No     CBD No     Flavoring No     Other No     Unknown No       I have reviewed and agree with the history as documented.     49-year-old female past medical history of psychiatric disorder, thyroid disease, GERD presents to emergency department complaining of lower abdominal pain for 5 days.      History provided by:  Patient  Abdominal Pain  Associated symptoms: no chest pain, no chills, no constipation, no diarrhea, no dysuria, no fever, no hematuria, no nausea, no shortness of breath, no vaginal bleeding, no vaginal discharge and no vomiting        Review of Systems   Constitutional:  Negative for chills and fever.   Respiratory:  Negative for shortness of breath.    Cardiovascular:  Negative for chest pain.   Gastrointestinal:  Positive for abdominal pain. Negative for abdominal distention, blood in stool, constipation, diarrhea, nausea and vomiting.   Genitourinary:  Negative for decreased urine volume, difficulty urinating, dysuria, flank pain, frequency, genital sores, hematuria, pelvic pain, urgency, vaginal bleeding, vaginal discharge and vaginal pain.   Musculoskeletal:  Negative for back pain.   Skin:  Negative for rash.   Neurological:  Negative for weakness.   All other systems reviewed and are negative.          Objective       ED Triage Vitals [03/29/25 1437]   Temperature Pulse Blood Pressure Respirations SpO2 Patient Position - Orthostatic VS   98.1 °F (36.7 °C) 65 129/75 18 99 % Sitting      Temp Source Heart Rate Source BP Location FiO2 (%) Pain Score    Oral -- Left arm -- --      Vitals      Date and Time Temp Pulse SpO2 Resp BP Pain Score FACES Pain Rating User   03/29/25 1437 98.1 °F (36.7 °C) 65 99 % 18 129/75 -- -- CO            Physical  Exam  Vitals and nursing note reviewed. Exam conducted with a chaperone present (ED Tech Jamey).   Constitutional:       General: She is awake. She is not in acute distress.     Appearance: Normal appearance. She is not ill-appearing, toxic-appearing or diaphoretic.   HENT:      Head: Normocephalic.      Mouth/Throat:      Lips: Pink.      Mouth: Mucous membranes are moist.   Eyes:      General: Vision grossly intact. No scleral icterus.  Cardiovascular:      Rate and Rhythm: Normal rate and regular rhythm.      Heart sounds: Normal heart sounds.   Pulmonary:      Effort: Pulmonary effort is normal. No respiratory distress.      Breath sounds: Normal breath sounds.   Abdominal:      General: There is no distension.      Tenderness: There is abdominal tenderness in the suprapubic area. There is no right CVA tenderness, left CVA tenderness, guarding or rebound.   Genitourinary:     General: Normal vulva.      Vagina: No foreign body. Vaginal discharge present. No erythema, tenderness or lesions.      Cervix: No cervical motion tenderness, friability or erythema.      Uterus: Not tender.       Adnexa:         Right: No tenderness.          Left: No tenderness.     Skin:     General: Skin is warm and dry.   Neurological:      Mental Status: She is alert.         Results Reviewed       Procedure Component Value Units Date/Time    Chlamydia/GC amplified DNA by PCR [[id number]] Collected: 03/29/25 1659    Lab Status: In process Specimen: Cervix Updated: 03/29/25 1711    Molecular Vaginal Panel [[id number]] Collected: 03/29/25 1659    Lab Status: In process Specimen: Genital from Vaginal Updated: 03/29/25 1711    Comprehensive metabolic panel [[id number]] Collected: 03/29/25 1535    Lab Status: Final result Specimen: Blood from Arm, Right Updated: 03/29/25 1613     Sodium 136 mmol/L      Potassium 3.9 mmol/L      Chloride 105 mmol/L      CO2 24 mmol/L      ANION GAP 7 mmol/L      BUN 17 mg/dL      Creatinine 0.91 mg/dL       Glucose 79 mg/dL      Calcium 9.0 mg/dL      AST 22 U/L      ALT 13 U/L      Alkaline Phosphatase 41 U/L      Total Protein 6.6 g/dL      Albumin 3.9 g/dL      Total Bilirubin 0.36 mg/dL      eGFR 74 ml/min/1.73sq m     Narrative:      National Kidney Disease Foundation guidelines for Chronic Kidney Disease (CKD):     Stage 1 with normal or high GFR (GFR > 90 mL/min/1.73 square meters)    Stage 2 Mild CKD (GFR = 60-89 mL/min/1.73 square meters)    Stage 3A Moderate CKD (GFR = 45-59 mL/min/1.73 square meters)    Stage 3B Moderate CKD (GFR = 30-44 mL/min/1.73 square meters)    Stage 4 Severe CKD (GFR = 15-29 mL/min/1.73 square meters)    Stage 5 End Stage CKD (GFR <15 mL/min/1.73 square meters)  Note: GFR calculation is accurate only with a steady state creatinine    Lipase [[id number]]  (Normal) Collected: 03/29/25 1535    Lab Status: Final result Specimen: Blood from Arm, Right Updated: 03/29/25 1613     Lipase 32 u/L     UA w Reflex to Microscopic w Reflex to Culture [[id number]]  (Abnormal) Collected: 03/29/25 1536    Lab Status: Final result Specimen: Urine, Clean Catch Updated: 03/29/25 1551     Color, UA Pema     Clarity, UA Clear     Specific Gravity, UA 1.025     pH, UA 6.0     Leukocytes, UA Negative     Nitrite, UA Negative     Protein, UA Negative mg/dl      Glucose, UA Negative mg/dl      Ketones, UA Negative mg/dl      Bilirubin, UA Negative     Occult Blood, UA Negative     UROBILINOGEN UA Negative mg/dL     CBC and differential [[id number]] Collected: 03/29/25 1535    Lab Status: Final result Specimen: Blood from Arm, Right Updated: 03/29/25 1548     WBC 6.26 Thousand/uL      RBC 4.62 Million/uL      Hemoglobin 13.0 g/dL      Hematocrit 38.3 %      MCV 83 fL      MCH 28.1 pg      MCHC 33.9 g/dL      RDW 12.4 %      MPV 10.3 fL      Platelets 263 Thousands/uL      nRBC 0 /100 WBCs      Segmented % 49 %      Immature Grans % 1 %      Lymphocytes % 38 %      Monocytes % 9 %      Eosinophils Relative 2 %       Basophils Relative 1 %      Absolute Neutrophils 3.10 Thousands/µL      Absolute Immature Grans 0.03 Thousand/uL      Absolute Lymphocytes 2.37 Thousands/µL      Absolute Monocytes 0.54 Thousand/µL      Eosinophils Absolute 0.15 Thousand/µL      Basophils Absolute 0.07 Thousands/µL     POCT pregnancy, urine [[id number]]  (Normal) Collected: 03/29/25 1539    Lab Status: Final result Updated: 03/29/25 1542     EXT Preg Test, Ur Negative     Control Valid            CT abdomen pelvis with contrast   Final Interpretation by Ger Adhikari MD (03/29 1639)   Addendum (preliminary) 1 of 1 by Ger Adhikari MD (03/29 1639)   ADDENDUM:      The impression was unintentionally omitted from the report. This should    read:      No acute findings in the abdomen or pelvis.      Final               Workstation performed: [id number]             Procedures    ED Medication and Procedure Management   Prior to Admission Medications   Prescriptions Last Dose Informant Patient Reported? Taking?   Aspirin-Acetaminophen-Caffeine (EXCEDRIN PO)  Self Yes No   Sig: Take by mouth if needed   Cholecalciferol 5000 units TABS  Self Yes No   Sig: take 1 capsule by oral route  every day start after completing weekly dose   Patient not taking: Reported on 6/12/2024   SUMAtriptan (IMITREX) 50 mg tablet   No No   Sig: Take 1 tablet (50 mg total) by mouth once as needed for migraine for up to 30 doses   fexofenadine (ALLEGRA) 60 MG tablet   No No   Sig: Take 1 tablet (60 mg total) by mouth daily as needed (allergies)   ibuprofen (MOTRIN) 600 mg tablet  Self No No   Sig: Take 1 tablet (600 mg total) by mouth every 6 (six) hours as needed for mild pain   levothyroxine 50 mcg tablet   No No   Sig: TAKE 1 TABLET BY MOUTH EVERY DAY   meclizine (ANTIVERT) 12.5 MG tablet  Self No No   Sig: Take 1 tablet (12.5 mg total) by mouth 3 (three) times a day as needed for dizziness   norethindrone-ethinyl estradiol (Aurovela FE 1/20) 1-20 MG-MCG per tablet    No No   Sig: Take 1 tablet by mouth daily   omeprazole (PriLOSEC) 40 MG capsule   No No   Sig: TAKE 1 CAPSULE (40 MG TOTAL) BY MOUTH DAILY.   ondansetron (ZOFRAN-ODT) 4 mg disintegrating tablet   No No   Sig: Take 1 tablet (4 mg total) by mouth every 8 (eight) hours as needed for nausea for up to 10 days      Facility-Administered Medications: None     Discharge Medication List as of 3/29/2025  5:13 PM        START taking these medications    Details   polyethylene glycol (MIRALAX) 17 g packet Take 17 g by mouth daily for 3 days, Starting Sat 3/29/2025, Until Tue 4/1/2025, Normal           CONTINUE these medications which have NOT CHANGED    Details   Aspirin-Acetaminophen-Caffeine (EXCEDRIN PO) Take by mouth if needed, Historical Med      Cholecalciferol 5000 units TABS take 1 capsule by oral route  every day start after completing weekly dose, Historical Med      fexofenadine (ALLEGRA) 60 MG tablet Take 1 tablet (60 mg total) by mouth daily as needed (allergies), Starting Thu 1/9/2025, No Print      ibuprofen (MOTRIN) 600 mg tablet Take 1 tablet (600 mg total) by mouth every 6 (six) hours as needed for mild pain, Starting Tue 10/18/2022, Normal      levothyroxine 50 mcg tablet TAKE 1 TABLET BY MOUTH EVERY DAY, Starting Sat 1/11/2025, Normal      meclizine (ANTIVERT) 12.5 MG tablet Take 1 tablet (12.5 mg total) by mouth 3 (three) times a day as needed for dizziness, Starting Wed 8/7/2024, Normal      norethindrone-ethinyl estradiol (Aurovela FE 1/20) 1-20 MG-MCG per tablet Take 1 tablet by mouth daily, Starting Fri 12/27/2024, Normal      omeprazole (PriLOSEC) 40 MG capsule TAKE 1 CAPSULE (40 MG TOTAL) BY MOUTH DAILY., Starting Sat 1/11/2025, Normal      ondansetron (ZOFRAN-ODT) 4 mg disintegrating tablet Take 1 tablet (4 mg total) by mouth every 8 (eight) hours as needed for nausea for up to 10 days, Starting Sun 12/15/2024, Until Wed 12/25/2024 at 2359, Normal      SUMAtriptan (IMITREX) 50 mg tablet Take 1  tablet (50 mg total) by mouth once as needed for migraine for up to 30 doses, Starting Thu 1/9/2025, Normal           No discharge procedures on file.  ED SEPSIS DOCUMENTATION   Time reflects when diagnosis was documented in both MDM as applicable and the Disposition within this note       Time User Action Codes Description Comment    3/29/2025  4:46 PM Jocelin Massey Add [R10.30] Lower abdominal pain     3/29/2025  4:47 PM Jocelin Massey Add [R19.5] Increased stool volume     3/29/2025  4:47 PM Jocelin Massey Remove [R19.5] Increased stool volume     3/29/2025  4:47 PM Jocelin Massey Add [K59.00] Constipation     3/29/2025  4:58 PM Jocelin Massey Add [N89.8] Vaginal discharge                  Jocelin Massey PA-C  03/30/25 1824

## 2025-03-31 LAB
C GLABRATA DNA VAG QL NAA+PROBE: NEGATIVE
C KRUSEI DNA VAG QL NAA+PROBE: NEGATIVE
C TRACH DNA SPEC QL NAA+PROBE: NEGATIVE
CANDIDA SP 6 PNL VAG NAA+PROBE: POSITIVE
N GONORRHOEA DNA SPEC QL NAA+PROBE: NEGATIVE
T VAGINALIS DNA VAG QL NAA+PROBE: NEGATIVE
VAGINOSIS/ITIS DNA PNL VAG PROBE+SIG AMP: NEGATIVE

## 2025-04-01 ENCOUNTER — RESULTS FOLLOW-UP (OUTPATIENT)
Dept: EMERGENCY DEPT | Facility: HOSPITAL | Age: 50
End: 2025-04-01

## 2025-04-01 RX ORDER — FLUCONAZOLE 200 MG/1
200 TABLET ORAL DAILY
Qty: 2 TABLET | Refills: 0 | Status: SHIPPED | OUTPATIENT
Start: 2025-04-01 | End: 2025-04-02

## 2025-04-09 ENCOUNTER — HOSPITAL ENCOUNTER (EMERGENCY)
Facility: HOSPITAL | Age: 50
Discharge: HOME | End: 2025-04-09
Attending: EMERGENCY MEDICINE
Payer: COMMERCIAL

## 2025-04-09 VITALS
DIASTOLIC BLOOD PRESSURE: 81 MMHG | TEMPERATURE: 98.1 F | WEIGHT: 169.31 LBS | HEART RATE: 63 BPM | BODY MASS INDEX: 29.06 KG/M2 | RESPIRATION RATE: 20 BRPM | SYSTOLIC BLOOD PRESSURE: 127 MMHG | OXYGEN SATURATION: 100 %

## 2025-04-09 DIAGNOSIS — M62.838 TRAPEZIUS MUSCLE SPASM: ICD-10-CM

## 2025-04-09 DIAGNOSIS — M54.2 NECK PAIN: Primary | ICD-10-CM

## 2025-04-09 PROCEDURE — 99283 EMERGENCY DEPT VISIT LOW MDM: CPT

## 2025-04-09 PROCEDURE — 99284 EMERGENCY DEPT VISIT MOD MDM: CPT | Performed by: EMERGENCY MEDICINE

## 2025-04-09 PROCEDURE — 96372 THER/PROPH/DIAG INJ SC/IM: CPT

## 2025-04-09 RX ORDER — NAPROXEN 500 MG/1
500 TABLET ORAL 2 TIMES DAILY WITH MEALS
Qty: 20 TABLET | Refills: 0 | Status: SHIPPED | OUTPATIENT
Start: 2025-04-09

## 2025-04-09 RX ORDER — METHOCARBAMOL 500 MG/1
500 TABLET, FILM COATED ORAL 2 TIMES DAILY PRN
Qty: 20 TABLET | Refills: 0 | Status: SHIPPED | OUTPATIENT
Start: 2025-04-09

## 2025-04-09 RX ORDER — KETOROLAC TROMETHAMINE 30 MG/ML
30 INJECTION, SOLUTION INTRAMUSCULAR; INTRAVENOUS ONCE
Status: COMPLETED | OUTPATIENT
Start: 2025-04-09 | End: 2025-04-09

## 2025-04-09 RX ORDER — ACETAMINOPHEN 325 MG/1
650 TABLET ORAL ONCE
Status: COMPLETED | OUTPATIENT
Start: 2025-04-09 | End: 2025-04-09

## 2025-04-09 RX ORDER — LIDOCAINE 50 MG/G
1 PATCH TOPICAL ONCE
Status: DISCONTINUED | OUTPATIENT
Start: 2025-04-09 | End: 2025-04-09 | Stop reason: HOSPADM

## 2025-04-09 RX ORDER — LIDOCAINE 50 MG/G
1 PATCH TOPICAL DAILY
Qty: 14 PATCH | Refills: 0 | Status: SHIPPED | OUTPATIENT
Start: 2025-04-09

## 2025-04-09 RX ORDER — METHOCARBAMOL 500 MG/1
500 TABLET, FILM COATED ORAL ONCE
Status: COMPLETED | OUTPATIENT
Start: 2025-04-09 | End: 2025-04-09

## 2025-04-09 RX ADMIN — KETOROLAC TROMETHAMINE 30 MG: 30 INJECTION, SOLUTION INTRAMUSCULAR at 12:24

## 2025-04-09 RX ADMIN — LIDOCAINE 1 PATCH: 700 PATCH TOPICAL at 12:24

## 2025-04-09 RX ADMIN — ACETAMINOPHEN 650 MG: 325 TABLET, FILM COATED ORAL at 12:24

## 2025-04-09 RX ADMIN — METHOCARBAMOL TABLETS 500 MG: 500 TABLET, COATED ORAL at 12:24

## 2025-04-09 NOTE — Clinical Note
Kaitlynn Richards was seen and treated in our emergency department on 4/9/2025.                Diagnosis:     Kaitlynn  may return to work on return date.    She may return on this date: 04/10/2025         If you have any questions or concerns, please don't hesitate to call.      Joe Blum MD    ______________________________           _______________          _______________  Hospital Representative                              Date                                Time

## 2025-04-09 NOTE — DISCHARGE INSTRUCTIONS
Take medications as prescribed, do not operate motor vehicle or machinery while taking the Robaxin as it can make you sleepy.    Patient Education     Neck Pain ED   General Information   You came to the Emergency Department (ED) for neck pain. Your neck has many parts including bones, muscles, tendons, ligaments, and nerves. Vertebrae, the bones in your spine, start at the base of your skull and extend down the back of your neck. There are discs between the vertebrae to cushion the bones. Ligaments, muscles, and tendons help hold your spine in place and let you move your neck. Your spinal cord starts at the base of your brain and extends down your back. It is protected by your vertebrae. Smaller nerves travel from your spinal cord to your muscles and skin.  Most neck pain is caused by an injury to a ligament, tendon, muscle, or nerve. The doctors who examined you today do not think you have a dangerous cause for your neck pain.  What care is needed at home?   Call your regular doctor to let them know you were in the ED. Make a follow-up appointment if you were told to.  Wear your neck brace or cushion as you were told to. If the doctor told you to, you may start doing gentle neck stretches in a few days.  For recent strains, place an ice pack or a bag of frozen vegetables wrapped in a towel over the painful part. Never put ice right on the skin. Use ice every 1 to 2 hours for 10 to 15 minutes at a time. Use for the first 24 to 48 hours after your injury.  Use heat after the first 24 to 48 hours, but not right away. Put a heating pad on the painful part for no more than 20 minutes at a time. Never go to sleep with a heating pad on as this can cause burns. You can also take a hot shower or bath.  You may want to take medicines like ibuprofen or naproxen for swelling and pain. These are nonsteroidal anti-inflammatory drugs (NSAIDs).  Try to practice good posture to avoid putting strain on your neck. Sit up straight  and keep your shoulders back. It can also help to avoid sitting in the same position for too long and to avoid putting pressure on your upper back by carrying heavy things. When you sleep, try to keep your neck in line with the rest of your body.  When do I need to get emergency help?   Call for an ambulance right away if:   Your neck becomes stiff and hard to move and you are feeling sick or develop a fever or chills.  Return to the ED if:   You have new weakness in one of your arms.  When do I need to call the doctor?   You have bad pain that is not helped by pain medicine.  Your hand or arm is swollen.  Your arm is numb or tingly.  You have new or worsening symptoms.  Last Reviewed Date   2021-03-09  Consumer Information Use and Disclaimer   This generalized information is a limited summary of diagnosis, treatment, and/or medication information. It is not meant to be comprehensive and should be used as a tool to help the user understand and/or assess potential diagnostic and treatment options. It does NOT include all information about conditions, treatments, medications, side effects, or risks that may apply to a specific patient. It is not intended to be medical advice or a substitute for the medical advice, diagnosis, or treatment of a health care provider based on the health care provider's examination and assessment of a patient’s specific and unique circumstances. Patients must speak with a health care provider for complete information about their health, medical questions, and treatment options, including any risks or benefits regarding use of medications. This information does not endorse any treatments or medications as safe, effective, or approved for treating a specific patient. [employer] and its affiliates disclaim any warranty or liability relating to this information or the use thereof. The use of this information is governed by the Terms of Use, available at  https://www.woltersMOGLuwer.com/en/know/clinical-effectiveness-terms   Copyright   Copyright © 2024 [employer] and its affiliates and/or licensors. All rights reserved.

## 2025-04-10 NOTE — ED PROVIDER NOTES
Time reflects when diagnosis was documented in both MDM as applicable and the Disposition within this note       Time User Action Codes Description Comment    4/9/2025 12:12 PM Check, Joe Keenan [M54.2] Neck pain     4/9/2025 12:12 PM Check, Joe Keenan [M62.838] Trapezius muscle spasm           ED Disposition       ED Disposition   Discharge    Condition   Stable    Date/Time   Wed Apr 9, 2025 12:12 PM    Comment   Kaitlynn Richards discharge to home/self care.                   Assessment & Plan       Medical Decision Making  49-year-old female presents to the emergency department for evaluation of left-sided neck pain.  On exam she is resting comfortably in bed in no acute distress.  No midline tenderness.  No focal neurologic deficits.  Suspect symptoms likely secondary to musculoskeletal strain versus muscle spasm, no signs of meningitis, unlikely to represent fracture.  Do not believe any further workup is necessary at this time plan to treat symptomatically and reassess.    Symptoms improved following medications, she is stable for discharge with ongoing symptomatic treatment.  She was given strict turn precautions and was in agreement with the plan.    Problems Addressed:  Neck pain: acute illness or injury  Trapezius muscle spasm: acute illness or injury    Amount and/or Complexity of Data Reviewed  External Data Reviewed: notes.    Risk  OTC drugs.  Prescription drug management.             Medications   ketorolac (TORADOL) injection 30 mg (30 mg Intramuscular Given 4/9/25 1224)   acetaminophen (TYLENOL) tablet 650 mg (650 mg Oral Given 4/9/25 1224)   methocarbamol (ROBAXIN) tablet 500 mg (500 mg Oral Given 4/9/25 1224)       ED Risk Strat Scores                    No data recorded        SBIRT 20yo+      Flowsheet Row Most Recent Value   Initial Alcohol Screen: US AUDIT-C     1. How often do you have a drink containing alcohol? 0 Filed at: 04/09/2025 7037   2. How many drinks containing alcohol do you have on  "a typical day you are drinking?  0 Filed at: 2025 1150   3b. FEMALE Any Age, or MALE 65+: How often do you have 4 or more drinks on one occassion? 0 Filed at: 2025 1151   Audit-C Score 0 Filed at: 2025 1152   ALESSIO: How many times in the past year have you...    Used an illegal drug or used a prescription medication for non-medical reasons? Never Filed at: 2025 1151                            History of Present Illness       Chief Complaint   Patient presents with    Neck Pain     L sided neck pain for one week - Tylenol and \"back and body pain relief\" taken w no relief - reports headaches - denies injury       Past Medical History:   Diagnosis Date    Allergies     Bipolar II disorder ([facility]) 2015    Class 3 severe obesity due to excess calories without serious comorbidity with body mass index (BMI) of 40.0 to 44.9 in adult ([facility]) 2020    Disease of thyroid gland     Elevated cholesterol 2022    GERD (gastroesophageal reflux disease)     Primary osteoarthritis of left knee 2020    UTI (urinary tract infection)       Past Surgical History:   Procedure Laterality Date     SECTION  2004    PREGNANCY AT TERM    WI ARTHRODESIS GREAT TOE METATARSOPHALANGEAL JOINT Left 2023    Procedure: 1ST MTPJ FUSION PSB PLANTAR PLATE REPAIR 2nd metatarsal;  Surgeon: Anne Jamil DPM;  Location: [facility];  Service: Podiatry    TOE OSTEOTOMY Left 2023    Procedure: WEIL OSTEOTOMY;  Surgeon: Anne Jamil DPM;  Location: [facility];  Service: Podiatry    TYMPANOSTOMY TUBE PLACEMENT      as child      Family History   Problem Relation Age of Onset    Thyroid disease Mother         DISORDER    Lung cancer Father     Diabetes Father         MELLITUS    Hypertension Father     No Known Problems Daughter     No Known Problems Maternal Grandmother     No Known Problems Maternal Grandfather     No Known Problems Paternal Grandmother     No Known Problems Paternal " Grandfather     No Known Problems Maternal Aunt     No Known Problems Maternal Aunt       Social History     Tobacco Use    Smoking status: Never     Passive exposure: Never    Smokeless tobacco: Never    Tobacco comments:     NO TOBACCO USE   Vaping Use    Vaping status: Never Used   Substance Use Topics    Alcohol use: No    Drug use: No      E-Cigarette/Vaping    E-Cigarette Use Never User       E-Cigarette/Vaping Substances    Nicotine No     THC No     CBD No     Flavoring No     Other No     Unknown No       I have reviewed and agree with the history as documented.     49-year-old female presents to the emergency department for evaluation of left-sided neck pain that started approximately 1 week ago, but has progressively worsened.  She has tried occasional Tylenol without any improvement.  She denies any specific trauma, fall, or known injury, does state that she goes to the gym frequently and may have tweaked something at that time.  The pain is located in the left trapezius region.  Is associated with a mild posterior headache as well.  No associated blurry vision or double vision.  No numbness, tingling, or weakness.  No fevers or chills.  No sore throat, ear pain, or dental pain.        Review of Systems   Constitutional:  Negative for chills and fever.   HENT:  Negative for ear pain and sore throat.    Eyes:  Negative for pain and visual disturbance.   Respiratory:  Negative for cough and shortness of breath.    Cardiovascular:  Negative for chest pain and palpitations.   Gastrointestinal:  Negative for abdominal pain and vomiting.   Genitourinary:  Negative for dysuria and hematuria.   Musculoskeletal:  Positive for neck pain. Negative for arthralgias and back pain.   Skin:  Negative for color change and rash.   Neurological:  Positive for headaches. Negative for seizures and syncope.   All other systems reviewed and are negative.          Objective       ED Triage Vitals   Temperature Pulse Blood  Pressure Respirations SpO2 Patient Position - Orthostatic VS   04/09/25 1154 04/09/25 1154 04/09/25 1154 04/09/25 1154 04/09/25 1154 04/09/25 1154   98.1 °F (36.7 °C) 63 127/81 20 100 % Sitting      Temp Source Heart Rate Source BP Location FiO2 (%) Pain Score    04/09/25 1154 04/09/25 1154 04/09/25 1154 -- 04/09/25 1224    Oral Monitor Left arm  10 - Worst Possible Pain      Vitals      Date and Time Temp Pulse SpO2 Resp BP Pain Score FACES Pain Rating User   04/09/25 1224 -- -- -- -- -- 10 - Worst Possible Pain -- DW   04/09/25 1154 98.1 °F (36.7 °C) 63 100 % 20 127/81 -- -- REMI            Physical Exam  Vitals and nursing note reviewed.   Constitutional:       General: She is not in acute distress.  HENT:      Head: Normocephalic and atraumatic.      Right Ear: External ear normal.      Left Ear: External ear normal.      Nose: Nose normal.      Mouth/Throat:      Mouth: Mucous membranes are moist.   Eyes:      Extraocular Movements: Extraocular movements intact.      Conjunctiva/sclera: Conjunctivae normal.      Pupils: Pupils are equal, round, and reactive to light.   Cardiovascular:      Rate and Rhythm: Normal rate and regular rhythm.      Pulses: Normal pulses.   Pulmonary:      Effort: Pulmonary effort is normal. No respiratory distress.      Breath sounds: No stridor.   Musculoskeletal:         General: No deformity. Normal range of motion.      Cervical back: Neck supple.      Comments: No midline neck or back tenderness, no step-offs or deformities.  Patient has tenderness to palpation of the left trapezius region with associated muscle spasm, no overlying skin changes.   Skin:     General: Skin is warm and dry.      Capillary Refill: Capillary refill takes less than 2 seconds.   Neurological:      General: No focal deficit present.      Mental Status: She is alert and oriented to person, place, and time.      Sensory: No sensory deficit.      Motor: No weakness.   Psychiatric:         Mood and Affect:  Mood normal.         Behavior: Behavior normal.         Results Reviewed       None            No orders to display       Procedures    ED Medication and Procedure Management   Prior to Admission Medications   Prescriptions Last Dose Informant Patient Reported? Taking?   Aspirin-Acetaminophen-Caffeine (EXCEDRIN PO)  Self Yes No   Sig: Take by mouth if needed   Cholecalciferol 5000 units TABS  Self Yes No   Sig: take 1 capsule by oral route  every day start after completing weekly dose   Patient not taking: Reported on 6/12/2024   SUMAtriptan (IMITREX) 50 mg tablet   No No   Sig: Take 1 tablet (50 mg total) by mouth once as needed for migraine for up to 30 doses   fexofenadine (ALLEGRA) 60 MG tablet   No No   Sig: Take 1 tablet (60 mg total) by mouth daily as needed (allergies)   ibuprofen (MOTRIN) 600 mg tablet  Self No No   Sig: Take 1 tablet (600 mg total) by mouth every 6 (six) hours as needed for mild pain   levothyroxine 50 mcg tablet   No No   Sig: TAKE 1 TABLET BY MOUTH EVERY DAY   meclizine (ANTIVERT) 12.5 MG tablet  Self No No   Sig: Take 1 tablet (12.5 mg total) by mouth 3 (three) times a day as needed for dizziness   norethindrone-ethinyl estradiol (Aurovela FE 1/20) 1-20 MG-MCG per tablet   No No   Sig: Take 1 tablet by mouth daily   omeprazole (PriLOSEC) 40 MG capsule   No No   Sig: TAKE 1 CAPSULE (40 MG TOTAL) BY MOUTH DAILY.   ondansetron (ZOFRAN-ODT) 4 mg disintegrating tablet   No No   Sig: Take 1 tablet (4 mg total) by mouth every 8 (eight) hours as needed for nausea for up to 10 days   polyethylene glycol (MIRALAX) 17 g packet   No No   Sig: Take 17 g by mouth daily for 3 days      Facility-Administered Medications: None     Discharge Medication List as of 4/9/2025 12:17 PM        START taking these medications    Details   lidocaine (Lidoderm) 5 % Apply 1 patch topically over 12 hours daily Remove & Discard patch within 12 hours or as directed by MD, Starting Wed 4/9/2025, Normal       methocarbamol (ROBAXIN) 500 mg tablet Take 1 tablet (500 mg total) by mouth 2 (two) times a day as needed for muscle spasms, Starting Wed 4/9/2025, Normal      naproxen (Naprosyn) 500 mg tablet Take 1 tablet (500 mg total) by mouth 2 (two) times a day with meals, Starting Wed 4/9/2025, Normal           CONTINUE these medications which have NOT CHANGED    Details   fexofenadine (ALLEGRA) 60 MG tablet Take 1 tablet (60 mg total) by mouth daily as needed (allergies), Starting Thu 1/9/2025, No Print      levothyroxine 50 mcg tablet TAKE 1 TABLET BY MOUTH EVERY DAY, Starting Sat 1/11/2025, Normal      meclizine (ANTIVERT) 12.5 MG tablet Take 1 tablet (12.5 mg total) by mouth 3 (three) times a day as needed for dizziness, Starting Wed 8/7/2024, Normal      norethindrone-ethinyl estradiol (Aurovela FE 1/20) 1-20 MG-MCG per tablet Take 1 tablet by mouth daily, Starting Fri 12/27/2024, Normal      omeprazole (PriLOSEC) 40 MG capsule TAKE 1 CAPSULE (40 MG TOTAL) BY MOUTH DAILY., Starting Sat 1/11/2025, Normal      ondansetron (ZOFRAN-ODT) 4 mg disintegrating tablet Take 1 tablet (4 mg total) by mouth every 8 (eight) hours as needed for nausea for up to 10 days, Starting Sun 12/15/2024, Until Wed 12/25/2024 at 2359, Normal      polyethylene glycol (MIRALAX) 17 g packet Take 17 g by mouth daily for 3 days, Starting Sat 3/29/2025, Until Tue 4/1/2025, Normal      SUMAtriptan (IMITREX) 50 mg tablet Take 1 tablet (50 mg total) by mouth once as needed for migraine for up to 30 doses, Starting Thu 1/9/2025, Normal           STOP taking these medications       Aspirin-Acetaminophen-Caffeine (EXCEDRIN PO) Comments:   Reason for Stopping:         Cholecalciferol 5000 units TABS Comments:   Reason for Stopping:         ibuprofen (MOTRIN) 600 mg tablet Comments:   Reason for Stopping:             No discharge procedures on file.  ED SEPSIS DOCUMENTATION   Time reflects when diagnosis was documented in both MDM as applicable and the  Disposition within this note       Time User Action Codes Description Comment    4/9/2025 12:12 PM Check, Joe Hussein [M54.2] Neck pain     4/9/2025 12:12 PM Check, Joe Hussein [M62.838] Trapezius muscle spasm                  Joe Blum MD  04/10/25 0820

## 2025-05-09 ENCOUNTER — TELEPHONE (OUTPATIENT)
Age: 50
End: 2025-05-09

## 2025-05-09 NOTE — TELEPHONE ENCOUNTER
Patient is calling to request that Primary Care Provider fill out a form for patient to have an [employer] disability card. Patient states she is at [facility], and was informed that her Primary Care Provider needs to fill this form out so patient could have accommodations for her arthritis. Patient also had sister on the phone requesting the same thing. Any questions, please call sisterNeri, at [phone].

## 2025-06-11 ENCOUNTER — VBI (OUTPATIENT)
Dept: ADMINISTRATIVE | Facility: OTHER | Age: 50
End: 2025-06-11

## 2025-06-11 NOTE — TELEPHONE ENCOUNTER
06/11/25 1:25 PM     Chart reviewed for CRC: Colonoscopy ; nothing is submitted to the patient's insurance at this time.     Jennifer Chin [facility]

## 2025-08-18 ENCOUNTER — OFFICE VISIT (OUTPATIENT)
Dept: FAMILY MEDICINE CLINIC | Facility: CLINIC | Age: 50
End: 2025-08-18
Payer: COMMERCIAL

## 2025-08-18 VITALS
HEIGHT: 64 IN | WEIGHT: 160.2 LBS | BODY MASS INDEX: 27.35 KG/M2 | HEART RATE: 57 BPM | TEMPERATURE: 98 F | DIASTOLIC BLOOD PRESSURE: 70 MMHG | SYSTOLIC BLOOD PRESSURE: 106 MMHG | OXYGEN SATURATION: 100 %

## 2025-08-18 DIAGNOSIS — E78.89 LIPIDS ABNORMAL: ICD-10-CM

## 2025-08-18 DIAGNOSIS — E03.8 OTHER SPECIFIED HYPOTHYROIDISM: ICD-10-CM

## 2025-08-18 DIAGNOSIS — Z00.00 MEDICARE ANNUAL WELLNESS VISIT, SUBSEQUENT: Primary | ICD-10-CM

## 2025-08-18 DIAGNOSIS — Z53.20 SCREENING FOR MALIGNANT NEOPLASM OF COLON DECLINED: ICD-10-CM

## 2025-08-18 DIAGNOSIS — L73.9 FOLLICULITIS: ICD-10-CM

## 2025-08-18 DIAGNOSIS — Z12.31 ENCOUNTER FOR SCREENING MAMMOGRAM FOR BREAST CANCER: ICD-10-CM

## 2025-08-18 PROCEDURE — 99214 OFFICE O/P EST MOD 30 MIN: CPT

## 2025-08-18 PROCEDURE — G0439 PPPS, SUBSEQ VISIT: HCPCS

## 2025-08-18 RX ORDER — MUPIROCIN 2 %
OINTMENT (GRAM) TOPICAL 2 TIMES DAILY
Qty: 30 G | Refills: 1 | Status: SHIPPED | OUTPATIENT
Start: 2025-08-18

## (undated) DEVICE — WEBRIL 6 IN UNSTERILE

## (undated) DEVICE — SPONGE LAP 18 X 18 IN STRL RFD

## (undated) DEVICE — SCD SEQUENTIAL COMPRESSION COMFORT SLEEVE MEDIUM KNEE LENGTH: Brand: KENDALL SCD

## (undated) DEVICE — CAST PADDING 4 IN SYNTHETIC NON-STRL

## (undated) DEVICE — CHLORAPREP HI-LITE 26ML ORANGE

## (undated) DEVICE — ACE WRAP 4 IN UNSTERILE

## (undated) DEVICE — GLOVE SRG BIOGEL 7

## (undated) DEVICE — DRILL BIT 1.7MM

## (undated) DEVICE — BLADE SAGITTAL 25.6 X 9.5MM

## (undated) DEVICE — DRILL BIT 2.7MM CALIBRATED

## (undated) DEVICE — STOCKINETTE REGULAR

## (undated) DEVICE — GLOVE INDICATOR PI UNDERGLOVE SZ 7 BLUE

## (undated) DEVICE — REAMER METATARSAL 20MM

## (undated) DEVICE — BETHLEHEM UNIVERSAL  MIONR EXT: Brand: CARDINAL HEALTH

## (undated) DEVICE — PLUMEPEN PRO 10FT

## (undated) DEVICE — REAMER PHALANGEAL 20MM

## (undated) DEVICE — GUIDEWIRE 0.045 IN TROCAR TIP

## (undated) DEVICE — OCCLUSIVE GAUZE STRIP,3% BISMUTH TRIBROMOPHENATE IN PETROLATUM BLEND: Brand: XEROFORM

## (undated) DEVICE — SYRINGE 10ML LL

## (undated) DEVICE — ACE WRAP 6 IN UNSTERILE

## (undated) DEVICE — PADDING CAST 4 IN  COTTON STRL

## (undated) DEVICE — DRAPE C-ARM X-RAY

## (undated) DEVICE — 2000CC GUARDIAN II: Brand: GUARDIAN

## (undated) DEVICE — 3M™ STERI-STRIP™ REINFORCED ADHESIVE SKIN CLOSURES, R1547, 1/2 IN X 4 IN (12 MM X 100 MM), 6 STRIPS/ENVELOPE: Brand: 3M™ STERI-STRIP™

## (undated) DEVICE — KERLIX BANDAGE ROLL: Brand: KERLIX

## (undated) DEVICE — NEEDLE 25G X 1 1/2

## (undated) DEVICE — 10FR FRAZIER SUCTION HANDLE: Brand: CARDINAL HEALTH

## (undated) DEVICE — INTENDED FOR TISSUE SEPARATION, AND OTHER PROCEDURES THAT REQUIRE A SHARP SURGICAL BLADE TO PUNCTURE OR CUT.: Brand: BARD-PARKER ® CARBON RIB-BACK BLADES

## (undated) DEVICE — CUFF TOURNIQUET 18 X 4 IN QUICK CONNECT DISP 1 BLADDER

## (undated) DEVICE — NEEDLE 18 G X 1 1/2